# Patient Record
Sex: FEMALE | Race: BLACK OR AFRICAN AMERICAN | NOT HISPANIC OR LATINO | Employment: OTHER | URBAN - METROPOLITAN AREA
[De-identification: names, ages, dates, MRNs, and addresses within clinical notes are randomized per-mention and may not be internally consistent; named-entity substitution may affect disease eponyms.]

---

## 2017-08-01 ENCOUNTER — OFFICE VISIT (OUTPATIENT)
Dept: PEDIATRICS | Facility: CLINIC | Age: 62
End: 2017-08-01
Payer: COMMERCIAL

## 2017-08-01 VITALS
SYSTOLIC BLOOD PRESSURE: 138 MMHG | DIASTOLIC BLOOD PRESSURE: 78 MMHG | OXYGEN SATURATION: 96 % | HEIGHT: 61 IN | BODY MASS INDEX: 32.76 KG/M2 | HEART RATE: 60 BPM | WEIGHT: 173.5 LBS | TEMPERATURE: 97.5 F

## 2017-08-01 DIAGNOSIS — M54.41 ACUTE BILATERAL LOW BACK PAIN WITH BILATERAL SCIATICA: ICD-10-CM

## 2017-08-01 DIAGNOSIS — Z00.00 ROUTINE HEALTH MAINTENANCE: Primary | ICD-10-CM

## 2017-08-01 DIAGNOSIS — M54.42 ACUTE BILATERAL LOW BACK PAIN WITH BILATERAL SCIATICA: ICD-10-CM

## 2017-08-01 LAB
BASOPHILS # BLD AUTO: 0 10E9/L (ref 0–0.2)
BASOPHILS NFR BLD AUTO: 0.1 %
DIFFERENTIAL METHOD BLD: NORMAL
EOSINOPHIL # BLD AUTO: 0.1 10E9/L (ref 0–0.7)
EOSINOPHIL NFR BLD AUTO: 1 %
ERYTHROCYTE [DISTWIDTH] IN BLOOD BY AUTOMATED COUNT: 11.8 % (ref 10–15)
HCT VFR BLD AUTO: 39.4 % (ref 35–47)
HGB BLD-MCNC: 13.5 G/DL (ref 11.7–15.7)
LYMPHOCYTES # BLD AUTO: 2 10E9/L (ref 0.8–5.3)
LYMPHOCYTES NFR BLD AUTO: 28 %
MCH RBC QN AUTO: 30.3 PG (ref 26.5–33)
MCHC RBC AUTO-ENTMCNC: 34.3 G/DL (ref 31.5–36.5)
MCV RBC AUTO: 89 FL (ref 78–100)
MONOCYTES # BLD AUTO: 0.6 10E9/L (ref 0–1.3)
MONOCYTES NFR BLD AUTO: 7.9 %
NEUTROPHILS # BLD AUTO: 4.5 10E9/L (ref 1.6–8.3)
NEUTROPHILS NFR BLD AUTO: 63 %
PLATELET # BLD AUTO: 174 10E9/L (ref 150–450)
RBC # BLD AUTO: 4.45 10E12/L (ref 3.8–5.2)
WBC # BLD AUTO: 7.2 10E9/L (ref 4–11)

## 2017-08-01 PROCEDURE — G0123 SCREEN CERV/VAG THIN LAYER: HCPCS | Performed by: NURSE PRACTITIONER

## 2017-08-01 PROCEDURE — 80061 LIPID PANEL: CPT | Performed by: NURSE PRACTITIONER

## 2017-08-01 PROCEDURE — 99386 PREV VISIT NEW AGE 40-64: CPT | Performed by: NURSE PRACTITIONER

## 2017-08-01 PROCEDURE — 99213 OFFICE O/P EST LOW 20 MIN: CPT | Mod: 25 | Performed by: NURSE PRACTITIONER

## 2017-08-01 PROCEDURE — 86803 HEPATITIS C AB TEST: CPT | Performed by: NURSE PRACTITIONER

## 2017-08-01 PROCEDURE — 85025 COMPLETE CBC W/AUTO DIFF WBC: CPT | Performed by: NURSE PRACTITIONER

## 2017-08-01 PROCEDURE — 36415 COLL VENOUS BLD VENIPUNCTURE: CPT | Performed by: NURSE PRACTITIONER

## 2017-08-01 PROCEDURE — G0476 HPV COMBO ASSAY CA SCREEN: HCPCS | Performed by: NURSE PRACTITIONER

## 2017-08-01 PROCEDURE — 82274 ASSAY TEST FOR BLOOD FECAL: CPT | Performed by: NURSE PRACTITIONER

## 2017-08-01 PROCEDURE — 80053 COMPREHEN METABOLIC PANEL: CPT | Performed by: NURSE PRACTITIONER

## 2017-08-01 NOTE — MR AVS SNAPSHOT
After Visit Summary   8/1/2017    Rajwinder Lama    MRN: [id number]           Patient Information     Date Of Birth          1955        Visit Information        Provider Department      8/1/2017 9:45 AM Mohini Dillon APRN CNP; [employer] [facility]        Today's Diagnoses     Routine health maintenance    -  1    Acute bilateral low back pain with bilateral sciatica          Care Instructions    -Ask insurance about shingles vaccine  -Mammogram  -Please send back stool test    Preventive Health Recommendations  Female Ages 50 - 64    Yearly exam: See your health care provider every year in order to  o Review health changes.   o Discuss preventive care.    o Review your medicines if your doctor has prescribed any.      Get a Pap test every three years (unless you have an abnormal result and your provider advises testing more often).    If you get Pap tests with HPV test, you only need to test every 5 years, unless you have an abnormal result.     You do not need a Pap test if your uterus was removed (hysterectomy) and you have not had cancer.    You should be tested each year for STDs (sexually transmitted diseases) if you're at risk.     Have a mammogram every 1 to 2 years.    Have a colonoscopy at age 50, or have a yearly FIT test (stool test). These exams screen for colon cancer.      Have a cholesterol test every 5 years, or more often if advised.    Have a diabetes test (fasting glucose) every three years. If you are at risk for diabetes, you should have this test more often.     If you are at risk for osteoporosis (brittle bone disease), think about having a bone density scan (DEXA).    Shots: Get a flu shot each year. Get a tetanus shot every 10 years.    Nutrition:     Eat at least 5 servings of fruits and vegetables each day.    Eat whole-grain bread, whole-wheat pasta and brown rice instead of white grains and rice.    Talk to your provider about  Calcium and Vitamin D.     Lifestyle    Exercise at least 150 minutes a week (30 minutes a day, 5 days a week). This will help you control your weight and prevent disease.    Limit alcohol to one drink per day.    No smoking.     Wear sunscreen to prevent skin cancer.     See your dentist every six months for an exam and cleaning.    See your eye doctor every 1 to 2 years.            Follow-ups after your visit        Additional Services     [facility] PT, HAND, AND CHIROPRACTIC REFERRAL       **This order will print in the [facility] Scheduling Office**    Physical Therapy, Hand Therapy and Chiropractic Care are available through:    *[facility]  *[facility]  *[facility]    Call one number to schedule at any of the above locations: [phone].    Your provider has referred you to: Physical Therapy at [facility] or [facility]    Indication/Reason for Referral: LBP  Onset of Illness: months  Therapy Orders: Evaluate and Treat  Special Programs: None  Special Request: None    Dg Conte      Additional Comments for the Therapist or Chiropractor:     Please be aware that coverage of these services is subject to the terms and limitations of your health insurance plan.  Call member services at your health plan with any benefit or coverage questions.      Please bring the following to your appointment:    *Your personal calendar for scheduling future appointments  *Comfortable clothing                  Future tests that were ordered for you today     Open Future Orders        Priority Expected Expires Ordered    *MA Screening Digital Bilateral Routine  8/1/2018 8/1/2017    Fecal colorectal cancer screen (FIT) Routine 8/22/2017 10/24/2017 8/1/2017            Who to contact     If you have questions or need follow up information about today's clinic visit or your schedule please contact [facility] directly at [phone].  Normal or non-critical lab and imaging results will  "be communicated to you by Tempered Mindhart, letter or phone within 4 business days after the clinic has received the results. If you do not hear from us within 7 days, please contact the clinic through DecideQuick or phone. If you have a critical or abnormal lab result, we will notify you by phone as soon as possible.  Submit refill requests through DecideQuick or call your pharmacy and they will forward the refill request to us. Please allow 3 business days for your refill to be completed.          Additional Information About Your Visit        DecideQuick Information     DecideQuick lets you send messages to your doctor, view your test results, renew your prescriptions, schedule appointments and more. To sign up, go to [email or web address] . Click on \"Log in\" on the left side of the screen, which will take you to the Welcome page. Then click on \"Sign up Now\" on the right side of the page.     You will be asked to enter the access code listed below, as well as some personal information. Please follow the directions to create your username and password.     Your access code is: [email or web address]  Expires: 10/30/2017 10:48 AM     Your access code will  in 90 days. If you need help or a new code, please call your [facility] clinic or [phone].        Care EveryWhere ID     This is your Care EveryWhere ID. This could be used by other organizations to access your [facility] medical records  [id number]        Your Vitals Were     Pulse Temperature Height Pulse Oximetry BMI (Body Mass Index)       60 97.5  F (36.4  C) (Tympanic) 5' 1.25\" (1.556 m) 96% 32.52 kg/m2        Blood Pressure from Last 3 Encounters:   17 138/78    Weight from Last 3 Encounters:   17 173 lb 8 oz (78.7 kg)              We Performed the Following     **Hepatitis C Screen Reflex to RNA FUTURE anytime     CBC with platelets differential     Comprehensive metabolic panel     HPV High Risk Types DNA Cervical     [facility] PT, HAND, AND CHIROPRACTIC REFERRAL     " LIPID REFLEX TO DIRECT LDL PANEL     Pap imaged thin layer screen with HPV - recommended age 30 - 65     TDAP VACCINE (ADACEL)        Primary Care Provider Fax #    Lionel Melchor [phone]       No address on file        Equal Access to Services     TRCAEY PEÑA : Hadii aad ku hadpatricko Sojanetali, waaxda luqadaha, qaybta kaalmada ademichael, hanane francoisin hayaarex rick vidalaracely rosales. So [facility] [phone].    ATENCIÓN: Si habla español, tiene a jaramillo disposición servicios gratuitos de asistencia lingüística. Llame al [phone].    We comply with applicable federal civil rights laws and [place] laws. We do not discriminate on the basis of race, color, national origin, age, disability sex, sexual orientation or gender identity.            Thank you!     Thank you for choosing [facility]  for your care. Our goal is always to provide you with excellent care. Hearing back from our patients is one way we can continue to improve our services. Please take a few minutes to complete the written survey that you may receive in the mail after your visit with us. Thank you!             Your Updated Medication List - Protect others around you: Learn how to safely use, store and throw away your medicines at www.disposemymeds.org.          This list is accurate as of: 8/1/17 10:48 AM.  Always use your most recent med list.                   Brand Name Dispense Instructions for use Diagnosis    MULTIVITAMIN PO

## 2017-08-01 NOTE — NURSING NOTE
"Chief Complaint   Patient presents with     Physical     Establish Care       Initial /78 (Cuff Size: Adult Large)  Pulse 60  Temp 97.5  F (36.4  C) (Tympanic)  Ht 5' 1.25\" (1.556 m)  Wt 173 lb 8 oz (78.7 kg)  SpO2 96%  BMI 32.52 kg/m2 Estimated body mass index is 32.52 kg/(m^2) as calculated from the following:    Height as of this encounter: 5' 1.25\" (1.556 m).    Weight as of this encounter: 173 lb 8 oz (78.7 kg).  Medication Reconciliation: complete   Llilie Jameson, DUSTY    "

## 2017-08-01 NOTE — PATIENT INSTRUCTIONS
-Ask insurance about shingles vaccine  -Mammogram  -Please send back stool test    Preventive Health Recommendations  Female Ages 50 - 64    Yearly exam: See your health care provider every year in order to  o Review health changes.   o Discuss preventive care.    o Review your medicines if your doctor has prescribed any.      Get a Pap test every three years (unless you have an abnormal result and your provider advises testing more often).    If you get Pap tests with HPV test, you only need to test every 5 years, unless you have an abnormal result.     You do not need a Pap test if your uterus was removed (hysterectomy) and you have not had cancer.    You should be tested each year for STDs (sexually transmitted diseases) if you're at risk.     Have a mammogram every 1 to 2 years.    Have a colonoscopy at age 50, or have a yearly FIT test (stool test). These exams screen for colon cancer.      Have a cholesterol test every 5 years, or more often if advised.    Have a diabetes test (fasting glucose) every three years. If you are at risk for diabetes, you should have this test more often.     If you are at risk for osteoporosis (brittle bone disease), think about having a bone density scan (DEXA).    Shots: Get a flu shot each year. Get a tetanus shot every 10 years.    Nutrition:     Eat at least 5 servings of fruits and vegetables each day.    Eat whole-grain bread, whole-wheat pasta and brown rice instead of white grains and rice.    Talk to your provider about Calcium and Vitamin D.     Lifestyle    Exercise at least 150 minutes a week (30 minutes a day, 5 days a week). This will help you control your weight and prevent disease.    Limit alcohol to one drink per day.    No smoking.     Wear sunscreen to prevent skin cancer.     See your dentist every six months for an exam and cleaning.    See your eye doctor every 1 to 2 years.

## 2017-08-01 NOTE — PROGRESS NOTES
SUBJECTIVE:   CC: Rajwinder Lama is an 62 year old woman who presents with  and daughter for preventive health visit.     HPI    Healthy Habits:    States she fasts a lot for [employer]    Do you get at least three servings of calcium containing foods daily (dairy, green leafy vegetables, etc.)? yes    Amount of exercise or daily activities, outside of work: was very active - since leg problem started 3 months ago has decreased activity    Problems taking medications regularly not applicable    Medication side effects: No    Have you had an eye exam in the past two years? no    Do you see a dentist twice per year? no    Do you have sleep apnea, excessive snoring or daytime drowsiness?does snore - but states it does not interfere with her sleep    Concerns today: about 3 months ago legs have become painful.  Legs are painful. Pain in the low back radiating down to the calves. Pain is deep and feels like severe pain and itching. Occurs day and night, with walking and at rest.   Both started 2-3 months ago. No fall or trauma.     States she has never had a mammogram    -------------------------------------    Today's PHQ-2 Score:   PHQ-2 ( 1999 Pfizer) 8/1/2017   Q1: Little interest or pleasure in doing things 0   Q2: Feeling down, depressed or hopeless 1   PHQ-2 Score 1     Abuse: Current or Past(Physical, Sexual or Emotional)- No  Do you feel safe in your environment - Yes    Social History   Substance Use Topics     Smoking status: Never Smoker     Smokeless tobacco: Never Used     Alcohol use Yes      Comment: ethipian drink rarely     The patient does not drink >3 drinks per day nor >7 drinks per week.    Reviewed orders with patient.  Reviewed health maintenance and updated orders accordingly - Yes  Labs reviewed in [facility]    Patient over age 50, mutual decision to screen reflected in health maintenance.      Pertinent mammograms are reviewed under the imaging tab.  History of abnormal Pap smear: NO -  "age 30-65 PAP every 5 years with negative HPV co-testing recommended    Reviewed and updated as needed this visit by clinical staff  Tobacco  Allergies  Meds  Med Hx  Surg Hx  Fam Hx  Soc Hx        Reviewed and updated as needed this visit by Provider              ROS:  C: NEGATIVE for fever, chills, change in weight  I: NEGATIVE for worrisome rashes, moles or lesions  E: NEGATIVE for vision changes or irritation  ENT: NEGATIVE for ear, mouth and throat problems  R: NEGATIVE for significant cough or SOB  B: NEGATIVE for masses, tenderness or discharge  CV: NEGATIVE for chest pain, palpitations or peripheral edema  GI: NEGATIVE for nausea, abdominal pain, heartburn, or change in bowel habits  : NEGATIVE for unusual urinary or vaginal symptoms. No vaginal bleeding.  M: NEGATIVE for significant arthralgias or myalgia  N: NEGATIVE for weakness, dizziness or paresthesias  P: NEGATIVE for changes in mood or affect      OBJECTIVE:   /78 (Cuff Size: Adult Large)  Pulse 60  Temp 97.5  F (36.4  C) (Tympanic)  Ht 5' 1.25\" (1.556 m)  Wt 173 lb 8 oz (78.7 kg)  SpO2 96%  BMI 32.52 kg/m2  EXAM:  GENERAL APPEARANCE: healthy, alert and no distress  EYES: Eyes grossly normal to inspection, PERRL and conjunctivae and sclerae normal  HENT: ear canals and TM's normal, nose and mouth without ulcers or lesions, oropharynx clear and oral mucous membranes moist  NECK: no adenopathy, no asymmetry, masses, or scars and thyroid normal to palpation  RESP: lungs clear to auscultation - no rales, rhonchi or wheezes  BREAST: normal without masses, tenderness or nipple discharge and no palpable axillary masses or adenopathy  CV: regular rate and rhythm, normal S1 S2, no S3 or S4, no murmur, click or rub, no peripheral edema and peripheral pulses strong  ABDOMEN: soft, nontender, no hepatosplenomegaly, no masses and bowel sounds normal   (female): normal female external genitalia, normal urethral meatus, vaginal mucosal atrophy " "noted, normal cervix, adnexae, and uterus without masses or abnormal discharge  MS: no musculoskeletal defects are noted and gait is age appropriate without ataxia  MSK: No deformity of spine. No TTP midline lumbar spine. No TTP paraspinal muscles. No TTP SI joint. 5/5 strength LEs.  NEURO: +2 DTRs. Negative SLR.   SKIN: no suspicious lesions or rashes  NEURO: Normal strength and tone, sensory exam grossly normal, mentation intact and speech normal  PSYCH: mentation appears normal and affect normal/bright    ASSESSMENT/PLAN:   1. Routine health maintenance  - GLUCOSE  - LIPID REFLEX TO DIRECT LDL PANEL  - Pap imaged thin layer screen with HPV - recommended age 30 - 65  - HPV High Risk Types DNA Cervical  - FIT  - *MA Screening Digital Bilateral; Future  -Recommended she check on shingles vaccine coverage through insurance.       (M54.42,  M54.41) Acute bilateral low back pain with bilateral sciatica  Comment: Patient is a poor historian even with  but it sounds like she has been having several months of LBP with radiculopathy down both legs. No red flag sx. DDX includes muscle spasm, herniated disc, spinal stenosis, etc. Will start with 4 weeks of PT with home exercises and have her f/u if condition not dramatically improving.   Plan: [facility] PT, HAND, AND CHIROPRACTIC REFERRAL        Discussed reasons to seek care urgently.         COUNSELING:  Reviewed preventive health counseling, as reflected in patient instructions    BP Screening:   Last 3 BP Readings:    BP Readings from Last 3 Encounters:   08/01/17 138/78       The following was recommended to the patient:  Re-screen BP within a year and recommended lifestyle modifications     reports that she has never smoked. She has never used smokeless tobacco.    Estimated body mass index is 32.52 kg/(m^2) as calculated from the following:    Height as of this encounter: 5' 1.25\" (1.556 m).    Weight as of this encounter: 173 lb 8 oz (78.7 kg).   Weight " management plan: Discussed healthy diet and exercise guidelines and patient will follow up in 12 months in clinic to re-evaluate.    Counseling Resources:  ATP IV Guidelines  Pooled Cohorts Equation Calculator  Breast Cancer Risk Calculator  FRAX Risk Assessment  ICSI Preventive Guidelines  Dietary Guidelines for Americans, 2010  USDA's MyPlate  ASA Prophylaxis  Lung CA Screening    Mohini Dillon, CHRISTIN LOPEZ  [facility]

## 2017-08-02 LAB
ALBUMIN SERPL-MCNC: 3.7 G/DL (ref 3.4–5)
ALP SERPL-CCNC: 58 U/L (ref 40–150)
ALT SERPL W P-5'-P-CCNC: 21 U/L (ref 0–50)
ANION GAP SERPL CALCULATED.3IONS-SCNC: 8 MMOL/L (ref 3–14)
AST SERPL W P-5'-P-CCNC: 16 U/L (ref 0–45)
BILIRUB SERPL-MCNC: 0.4 MG/DL (ref 0.2–1.3)
BUN SERPL-MCNC: 14 MG/DL (ref 7–30)
CALCIUM SERPL-MCNC: 9.2 MG/DL (ref 8.5–10.1)
CHLORIDE SERPL-SCNC: 107 MMOL/L (ref 94–109)
CHOLEST SERPL-MCNC: 160 MG/DL
CO2 SERPL-SCNC: 29 MMOL/L (ref 20–32)
CREAT SERPL-MCNC: 0.58 MG/DL (ref 0.52–1.04)
GFR SERPL CREATININE-BSD FRML MDRD: NORMAL ML/MIN/1.7M2
GLUCOSE SERPL-MCNC: 83 MG/DL (ref 70–99)
HCV AB SERPL QL IA: NORMAL
HDLC SERPL-MCNC: 58 MG/DL
LDLC SERPL CALC-MCNC: 85 MG/DL
NONHDLC SERPL-MCNC: 102 MG/DL
POTASSIUM SERPL-SCNC: 3.8 MMOL/L (ref 3.4–5.3)
PROT SERPL-MCNC: 7 G/DL (ref 6.8–8.8)
SODIUM SERPL-SCNC: 144 MMOL/L (ref 133–144)
TRIGL SERPL-MCNC: 87 MG/DL

## 2017-08-04 LAB
COPATH REPORT: NORMAL
PAP: NORMAL

## 2017-08-07 ENCOUNTER — RESULT FOLLOW UP (OUTPATIENT)
Dept: PEDIATRICS | Facility: CLINIC | Age: 62
End: 2017-08-07

## 2017-08-07 DIAGNOSIS — R87.810 CERVICAL HIGH RISK HPV (HUMAN PAPILLOMAVIRUS) TEST POSITIVE: ICD-10-CM

## 2017-08-07 LAB
FINAL DIAGNOSIS: ABNORMAL
HPV HR 12 DNA CVX QL NAA+PROBE: NEGATIVE
HPV16 DNA SPEC QL NAA+PROBE: POSITIVE
HPV18 DNA SPEC QL NAA+PROBE: NEGATIVE
SPECIMEN DESCRIPTION: ABNORMAL

## 2017-08-07 NOTE — LETTER
November 29, 2017      Jacannalee Lama  [address] [place] [address]    Dear ,      At [facility], your health and wellness is our primary concern. That is why we are following up on a positive high risk HPV test from 08/01/17, which was reported as HPV 16. Your provider had recommended that you have a Colposcopy completed by 11/01/17. Our records do not show that this has been done.      It is important to complete the follow up that your provider has suggested for you to ensure that there are no worsening changes which may, over time, develop into cancer.      Please contact our office at  [phone] to reschedule your appointment for a Colposcopy at your earliest convenience. If you have questions or concerns, please call the clinic and we will be happy to assist you.    If you have completed the tests outside of [facility], please have the results forwarded to our office. We will update the chart for your primary Physician to review before your next annual physical.     Thank you for choosing [facility]!    Sincerely,      Mohini Dillon, CHRISTIN CNP/es

## 2017-08-07 NOTE — LETTER
July 22, 2019      Rajwinder KATIA Daina  [address] [place] [address]    Dear ,      At [facility], your health and wellness is our primary concern. That is why we are following up on a positive high risk HPV test from 02/05/19, which was reported as HPV 16. Your provider had recommended that you have a Colposcopy  completed by 05/05/19. Our records do not show that this has been done or scheduled.      If you have chosen not to do the recommended colposcopy, please contact our office at [phone] to schedule an appointment for a repeat PAP smear and HPV test at your earliest convenience.    If you have completed the tests outside of [facility], please have the results forwarded to our office. We will update the chart for your primary Physician to review before your next annual physical.     Thank you for choosing [facility]!    Sincerely,      Your [facility] Care Team/[facility]

## 2017-08-07 NOTE — PROGRESS NOTES
08/01/17 NIL, +HPV 16. Plan colp  [facility]09[facility]17 [facility] Left message for patient to call back to this writer.   08/23/17 Left message on pt's home number listed for her or her daughter to call me back for results, there is consent in chart to speak with pts daughter and other family members as well.   08/24/17 Spoke with pt's daughter Claire and relayed results and recommendations for colposcopy. [place] scheduled for 10/27/17 with Dr. Eli.  11/16/17 Pt no showed colp. zanda colp reminder message sent at request of RN. ([facility])  11/29/17 MyChart not read, colp reminder letter sent. ([facility])   12/15/17 [place]- ZO 2-3.  Plan: LEEP due by 3/15/18 Appt 2/14/18 (rlm)  2/14/17 LEEP - Negative. Plan 1 yr co-test due 2/14/2019. ([facility])  02/01/19 Panel management sent zanda message. ([facility])  02/05/19: NIL pap, + HR HPV type 16 result. Plan [place] due bef 05/05/19. (sk)  02/12/19 Phone call attempt with interpretor, no answer, voice mail box was full so unable to leave a message, will make 2nd phone call attempt later this week. ([facility])  02/14/19 Phone call attempt to pt's daughter's number (see consent to communicate in chart), no answer, voice mail box is full so unable to leave message. zanda result letter released with recommendations for colposcopy.   02/22/19 Result letter sent at request of RN. ([facility])  04/05/19 Result letter sent certified at request of RN: [phone] ([facility])   04/12/19 Per USPS tracking, certified letter delivered 04/08/19. Verification sent to [facility] for scanning into pts chart. ([facility])  05/06/19 3mo colp not done, chart updated for 6mo colp/pap. ([facility])  07/22/19 [facility] reminder letter sent. ([facility])  8/21/19 [place]- Negative. Plan 1 yr co-test due by 8/21/20. Patient informed per Gyn RN ([facility])

## 2017-08-08 ENCOUNTER — OFFICE VISIT (OUTPATIENT)
Dept: OPTOMETRY | Facility: CLINIC | Age: 62
End: 2017-08-08
Payer: COMMERCIAL

## 2017-08-08 ENCOUNTER — RADIANT APPOINTMENT (OUTPATIENT)
Dept: MAMMOGRAPHY | Facility: CLINIC | Age: 62
End: 2017-08-08
Attending: NURSE PRACTITIONER
Payer: COMMERCIAL

## 2017-08-08 ENCOUNTER — APPOINTMENT (OUTPATIENT)
Dept: OPTOMETRY | Facility: CLINIC | Age: 62
End: 2017-08-08
Payer: COMMERCIAL

## 2017-08-08 DIAGNOSIS — H26.9 BILATERAL INCIPIENT CATARACTS: ICD-10-CM

## 2017-08-08 DIAGNOSIS — Z00.00 ROUTINE HEALTH MAINTENANCE: ICD-10-CM

## 2017-08-08 DIAGNOSIS — H52.03 HYPEROPIA WITH ASTIGMATISM AND PRESBYOPIA, BILATERAL: Primary | ICD-10-CM

## 2017-08-08 DIAGNOSIS — H52.4 HYPEROPIA WITH ASTIGMATISM AND PRESBYOPIA, BILATERAL: Primary | ICD-10-CM

## 2017-08-08 DIAGNOSIS — H21.9 FOCAL LESION OF IRIS: ICD-10-CM

## 2017-08-08 DIAGNOSIS — H52.203 HYPEROPIA WITH ASTIGMATISM AND PRESBYOPIA, BILATERAL: Primary | ICD-10-CM

## 2017-08-08 LAB — HEMOCCULT STL QL IA: NEGATIVE

## 2017-08-08 PROCEDURE — 92341 FIT SPECTACLES BIFOCAL: CPT | Performed by: OPTOMETRIST

## 2017-08-08 PROCEDURE — 92004 COMPRE OPH EXAM NEW PT 1/>: CPT | Performed by: OPTOMETRIST

## 2017-08-08 PROCEDURE — 92015 DETERMINE REFRACTIVE STATE: CPT | Performed by: OPTOMETRIST

## 2017-08-08 PROCEDURE — G0202 SCR MAMMO BI INCL CAD: HCPCS | Mod: TC

## 2017-08-08 ASSESSMENT — EXTERNAL EXAM - RIGHT EYE: OD_EXAM: NORMAL

## 2017-08-08 ASSESSMENT — KERATOMETRY
METHOD_AUTO_MANUAL: AUTOMATED
OS_K2POWER_DIOPTERS: 43.50
OS_AXISANGLE_DEGREES: 79
OS_K1POWER_DIOPTERS: 42.50
OD_AXISANGLE2_DEGREES: 171
OD_K1POWER_DIOPTERS: 41.87
OS_AXISANGLE2_DEGREES: 169
OD_AXISANGLE_DEGREES: 81
OD_K2POWER_DIOPTERS: 43.25

## 2017-08-08 ASSESSMENT — REFRACTION_MANIFEST
OD_CYLINDER: SPHERE
OS_AXIS: 030
OD_CYLINDER: +0.25
OS_SPHERE: +3.25
METHOD_AUTOREFRACTION: 1
OD_SPHERE: +1.25
OS_CYLINDER: +0.75
OS_AXIS: 005
OD_SPHERE: +1.25
OD_AXIS: 129
OS_CYLINDER: +0.50
OS_SPHERE: +3.00

## 2017-08-08 ASSESSMENT — EXTERNAL EXAM - LEFT EYE: OS_EXAM: NORMAL

## 2017-08-08 ASSESSMENT — VISUAL ACUITY
OS_SC: 20/60
OD_SC: 20/80
METHOD: SNELLEN - LINEAR
OS_SC: 20/125
OS_SC+: -2
OD_SC: 20/40
OD_SC+: -1

## 2017-08-08 ASSESSMENT — CONF VISUAL FIELD
OD_NORMAL: 1
OS_NORMAL: 1

## 2017-08-08 ASSESSMENT — SLIT LAMP EXAM - LIDS
COMMENTS: NORMAL
COMMENTS: NORMAL

## 2017-08-08 ASSESSMENT — TONOMETRY
IOP_METHOD: APPLANATION
OS_IOP_MMHG: 15
OD_IOP_MMHG: 14

## 2017-08-08 ASSESSMENT — CUP TO DISC RATIO
OS_RATIO: 0.5
OD_RATIO: 0.5

## 2017-08-08 NOTE — MR AVS SNAPSHOT
After Visit Summary   8/8/2017    Rajwinder Lama    MRN: [id number]           Patient Information     Date Of Birth          1955        Visit Information        Provider Department      8/8/2017 11:00 AM Rin Jameson OD; [employer] [facility]        Today's Diagnoses     Hyperopia with astigmatism and presbyopia, bilateral    -  1    Focal lesion of iris        Bilateral incipient cataracts           Follow-ups after your visit        Your next 10 appointments already scheduled     Aug 08, 2017  1:30 PM CDT   LAB with EA LAB   [facility] ([facility]    [address] [place] [address]  [address]  [facility] [id number] [phone]           Patient must bring picture ID. Patient should be prepared to give a urine specimen  Please do not eat 10-12 hours before your appointment if you are coming in fasting for labs on lipids, cholesterol, or glucose (sugar). Pregnant women should follow their Care Team instructions. Water with medications is okay. Do not drink coffee or other fluids. If you have concerns about taking  your medications, please ask at office or if scheduling via [employer], send a message by clicking on Secure Messaging, Message Your Care Team.            Aug 15, 2017  1:00 PM CDT   YOBANY Spine with Chely Lucia PT   [facility] ([facility]  )    [address] [place] [address]  [address]  [facility] [address]   [phone]            Aug 22, 2017 10:30 AM CDT   YOBANY Spine with Chely Lucia PT   [facility] ([facility]  )    [address] [place] [address]  [address]  [facility] [address]   [phone]              Who to contact     If you have questions or need follow up information about today's clinic visit or your schedule please contact [facility] directly at [phone].  Normal or non-critical lab and imaging results will be communicated to you by MyChart, letter or  "phone within 4 business days after the clinic has received the results. If you do not hear from us within 7 days, please contact the clinic through [employer] or phone. If you have a critical or abnormal lab result, we will notify you by phone as soon as possible.  Submit refill requests through [employer] or call your pharmacy and they will forward the refill request to us. Please allow 3 business days for your refill to be completed.          Additional Information About Your Visit        [employer] Information     [employer] lets you send messages to your doctor, view your test results, renew your prescriptions, schedule appointments and more. To sign up, go to [email or web address] . Click on \"Log in\" on the left side of the screen, which will take you to the Welcome page. Then click on \"Sign up Now\" on the right side of the page.     You will be asked to enter the access code listed below, as well as some personal information. Please follow the directions to create your username and password.     Your access code is: [email or web address]  Expires: 10/30/2017 10:48 AM     Your access code will  in 90 days. If you need help or a new code, please call your [place] clinic or [phone].        Care EveryWhere ID     This is your Care EveryWhere ID. This could be used by other organizations to access your [place] medical records  [id number]         Blood Pressure from Last 3 Encounters:   17 138/78    Weight from Last 3 Encounters:   17 78.7 kg (173 lb 8 oz)              We Performed the Following     EYE EXAM (SIMPLE-NONBILLABLE)     REFRACTION        Primary Care Provider Fax #    [facility] [phone]       No address on file        Equal Access to Services     TRACEY PEÑA : Hadii estevan Gómez, jose manuel presley, hanane ling. So [facility] [phone].    ATENCIÓN: Si habla español, tiene a jaramillo disposición servicios gratuitos de " miki lingüísticcece. Joshua al [phone].    We comply with applicable federal civil rights laws and [place] laws. We do not discriminate on the basis of race, color, national origin, age, disability sex, sexual orientation or gender identity.            Thank you!     Thank you for choosing [facility]  for your care. Our goal is always to provide you with excellent care. Hearing back from our patients is one way we can continue to improve our services. Please take a few minutes to complete the written survey that you may receive in the mail after your visit with us. Thank you!             Your Updated Medication List - Protect others around you: Learn how to safely use, store and throw away your medicines at www.disposemymeds.org.          This list is accurate as of: 8/8/17 12:09 PM.  Always use your most recent med list.                   Brand Name Dispense Instructions for use Diagnosis    MULTIVITAMIN PO

## 2017-08-08 NOTE — LETTER
[facility]  [address]  [place] [address]                  [phone]   August 8, 2017    Rajwinder Lama  [address] [address]      Dear Rajwinder,    Here is a summary of your recent test results:    Your stool testing was negative/normal.     We will do this every year to screen for colon cancer.     Your test results are enclosed.      Please contact me if you have any questions.           Thank you very much for choosing [facility]    Best regards,    Mohini Dillon CNP        Results for orders placed or performed in visit on 08/08/17   Fecal colorectal cancer screen (FIT)   Result Value Ref Range    Occult Blood Scn FIT Negative NEG

## 2017-08-08 NOTE — PROGRESS NOTES
Chief Complaint   Patient presents with     COMPREHENSIVE EYE EXAM     Routine never had an eye exam      Accompanied by   Last Eye Exam: Never  Dilated Previously: No, side effects of dilation explained today    What are you currently using to see?  does not use glasses or contacts       Distance Vision Acuity: Noticed gradual change in both eyes    Near Vision Acuity: Not satisfied     Eye Comfort: dry  Do you use eye drops? : Yes: OTC Rewetting  Occupation or Hobbies: Reading               Medical, surgical and family histories reviewed and updated 8/8/2017.       OBJECTIVE: See Ophthalmology exam    ASSESSMENT:    ICD-10-CM    1. Hyperopia with astigmatism and presbyopia, bilateral H52.03     H52.203     H52.4    2. Focal lesion of iris H21.89    3. Bilateral incipient cataracts H26.9       PLAN:   Poor communication/ very lengthy exam poor historian  Patient states at end of exam the spot on her L eye has been there since childhood and has not changed. Does not need referral, it was measured today and will be reassessed if changes occur.      Rin Jameson OD

## 2017-08-15 ENCOUNTER — THERAPY VISIT (OUTPATIENT)
Dept: PHYSICAL THERAPY | Facility: CLINIC | Age: 62
End: 2017-08-15
Payer: COMMERCIAL

## 2017-08-15 DIAGNOSIS — M25.562 KNEE PAIN, LEFT: ICD-10-CM

## 2017-08-15 DIAGNOSIS — M54.50 LUMBAGO: ICD-10-CM

## 2017-08-15 PROCEDURE — 97161 PT EVAL LOW COMPLEX 20 MIN: CPT | Mod: GP | Performed by: PHYSICAL THERAPIST

## 2017-08-15 PROCEDURE — 97110 THERAPEUTIC EXERCISES: CPT | Mod: GP | Performed by: PHYSICAL THERAPIST

## 2017-08-25 ENCOUNTER — THERAPY VISIT (OUTPATIENT)
Dept: PHYSICAL THERAPY | Facility: CLINIC | Age: 62
End: 2017-08-25
Payer: COMMERCIAL

## 2017-08-25 DIAGNOSIS — M54.50 LUMBAGO: ICD-10-CM

## 2017-08-25 DIAGNOSIS — M25.562 KNEE PAIN, LEFT: ICD-10-CM

## 2017-08-25 PROCEDURE — 97140 MANUAL THERAPY 1/> REGIONS: CPT | Mod: GP | Performed by: PHYSICAL THERAPIST

## 2017-08-25 PROCEDURE — 97110 THERAPEUTIC EXERCISES: CPT | Mod: GP | Performed by: PHYSICAL THERAPIST

## 2017-08-25 PROCEDURE — 97112 NEUROMUSCULAR REEDUCATION: CPT | Mod: GP | Performed by: PHYSICAL THERAPIST

## 2017-09-01 ENCOUNTER — THERAPY VISIT (OUTPATIENT)
Dept: PHYSICAL THERAPY | Facility: CLINIC | Age: 62
End: 2017-09-01
Payer: COMMERCIAL

## 2017-09-01 DIAGNOSIS — M54.50 LUMBAGO: ICD-10-CM

## 2017-09-01 DIAGNOSIS — M25.562 KNEE PAIN, LEFT: ICD-10-CM

## 2017-09-01 PROCEDURE — 97110 THERAPEUTIC EXERCISES: CPT | Mod: GP | Performed by: PHYSICAL THERAPIST

## 2017-09-01 PROCEDURE — 97112 NEUROMUSCULAR REEDUCATION: CPT | Mod: GP | Performed by: PHYSICAL THERAPIST

## 2017-09-08 ENCOUNTER — THERAPY VISIT (OUTPATIENT)
Dept: PHYSICAL THERAPY | Facility: CLINIC | Age: 62
End: 2017-09-08
Payer: COMMERCIAL

## 2017-09-08 DIAGNOSIS — M54.50 LUMBAGO: ICD-10-CM

## 2017-09-08 DIAGNOSIS — M25.562 KNEE PAIN, LEFT: ICD-10-CM

## 2017-09-08 PROCEDURE — 97110 THERAPEUTIC EXERCISES: CPT | Mod: GP | Performed by: PHYSICAL THERAPIST

## 2017-09-08 PROCEDURE — 97112 NEUROMUSCULAR REEDUCATION: CPT | Mod: GP | Performed by: PHYSICAL THERAPIST

## 2017-09-13 ENCOUNTER — OFFICE VISIT (OUTPATIENT)
Dept: PEDIATRICS | Facility: CLINIC | Age: 62
End: 2017-09-13
Payer: COMMERCIAL

## 2017-09-13 ENCOUNTER — RADIANT APPOINTMENT (OUTPATIENT)
Dept: GENERAL RADIOLOGY | Facility: CLINIC | Age: 62
End: 2017-09-13
Attending: PHYSICIAN ASSISTANT
Payer: COMMERCIAL

## 2017-09-13 VITALS
SYSTOLIC BLOOD PRESSURE: 154 MMHG | TEMPERATURE: 98.8 F | BODY MASS INDEX: 32.89 KG/M2 | DIASTOLIC BLOOD PRESSURE: 70 MMHG | WEIGHT: 174.2 LBS | HEART RATE: 56 BPM | HEIGHT: 61 IN | RESPIRATION RATE: 14 BRPM | OXYGEN SATURATION: 97 %

## 2017-09-13 DIAGNOSIS — M54.41 CHRONIC RIGHT-SIDED LOW BACK PAIN WITH RIGHT-SIDED SCIATICA: ICD-10-CM

## 2017-09-13 DIAGNOSIS — M25.562 CHRONIC PAIN OF LEFT KNEE: ICD-10-CM

## 2017-09-13 DIAGNOSIS — G89.29 CHRONIC PAIN OF LEFT KNEE: Primary | ICD-10-CM

## 2017-09-13 DIAGNOSIS — M25.562 CHRONIC PAIN OF LEFT KNEE: Primary | ICD-10-CM

## 2017-09-13 DIAGNOSIS — G89.29 CHRONIC RIGHT-SIDED LOW BACK PAIN WITH RIGHT-SIDED SCIATICA: ICD-10-CM

## 2017-09-13 DIAGNOSIS — G89.29 CHRONIC PAIN OF LEFT KNEE: ICD-10-CM

## 2017-09-13 PROCEDURE — 73562 X-RAY EXAM OF KNEE 3: CPT | Mod: RT

## 2017-09-13 PROCEDURE — 99214 OFFICE O/P EST MOD 30 MIN: CPT | Performed by: PHYSICIAN ASSISTANT

## 2017-09-13 RX ORDER — HYDROCODONE BITARTRATE AND ACETAMINOPHEN 5; 325 MG/1; MG/1
1 TABLET ORAL EVERY 4 HOURS PRN
Qty: 15 TABLET | Refills: 0 | Status: SHIPPED | OUTPATIENT
Start: 2017-09-13 | End: 2018-02-14

## 2017-09-13 NOTE — PROGRESS NOTES
"  SUBJECTIVE:   Rajwinder Lama is a 62 year old female who presents to clinic today for the following health issues:    Patient speaks Spanish (from [place]). Daughter interpreting.     Joint Pain    Onset: 3 months and worsening     Description:   Location: left knee  Character: Dull ache and Burning    Intensity: severe; constant    No pain upon awakening and after one hour, pain begins    Progression of Symptoms: worse    Accompanying Signs & Symptoms:    Other symptoms: no swelling, bruising, redness    No locking.    Giving way sensation    Numbness and tingling from back pain    History:   Previous similar pain: YES- worse after starting Physical therapy     Precipitating factors:   Trauma or overuse: no  Standing, walking, movement    Alleviating factors:  Improved by: nothing  Therapies Tried and outcome: none    Patient also has LBP with right radiculopathy. Pain extends to right foot. Very painful. Has paresthesias. No improvement with physical therapy.    ROS:  ROS otherwise negative    OBJECTIVE:                                                    /70 (BP Location: Right arm, Patient Position: Chair, Cuff Size: Adult Large)  Pulse 56  Temp 98.8  F (37.1  C) (Oral)  Resp 14  Ht 5' 1.25\" (1.556 m)  Wt 174 lb 3.2 oz (79 kg)  SpO2 97%  BMI 32.65 kg/m2  Body mass index is 32.65 kg/(m^2).   GENERAL: alert, no distress  Knee Exam: Inspection: small effusion  Tender: medial joint line  Active Range of Motion: full flexion, full extension; pain with inversion and eversion  Strength: limited due to pain  Special tests: positive Michael's    Diagnostic test results:  Xray of the knee reveals osteophytes otherwise NEGATIVE.   No results found for this or any previous visit (from the past 24 hour(s)).       ASSESSMENT/PLAN:                                                    (M25.562,  G89.29) Chronic pain of left knee  (primary encounter diagnosis)  Comment: proceed with RICE and norco PRN pain. Follow " up with ortho.   Plan: HYDROcodone-acetaminophen (NORCO) 5-325 MG per         tablet, ORTHO  REFERRAL, CANCELED: XR         Knee Standing Left 2 Views            (M54.41,  G89.29) Chronic right-sided low back pain with right-sided sciatica  Comment: ongoing with no improvement with physical therapy. Given duration, severity and extent of symptoms, likely need MRI for further evaluation.   Plan: HYDROcodone-acetaminophen (NORCO) 5-325 MG per         tablet, ORTHO  REFERRAL            See Patient Instructions    Claritza Hart PA-C  [facility]

## 2017-09-13 NOTE — MR AVS SNAPSHOT
After Visit Summary   9/13/2017    Rajwinder Lama    MRN: [id number]           Patient Information     Date Of Birth          1955        Visit Information        Provider Department      9/13/2017 3:10 PM Claritza Hart PA-C [facility]        Today's Diagnoses     Chronic pain of left knee    -  1    Chronic right-sided low back pain with right-sided sciatica          Care Instructions    Follow up with ortho          Follow-ups after your visit        Additional Services     ORTHO  REFERRAL       [facility] is referring you to the Orthopedic  Services at [facility].       The  Representative will assist you in the coordination of your Orthopedic and Musculoskeletal Care as prescribed by your physician.    The  Representative will call you within 1 business day to help schedule your appointment, or you may contact the  Representative at:    All areas ~ [phone]     Type of Referral : Non Surgical       Timeframe requested: 3 - 5 days    Coverage of these services is subject to the terms and limitations of your health insurance plan.  Please call member services at your health plan with any benefit or coverage questions.      If X-rays, CT or MRI's have been performed, please contact the facility where they were done to arrange for , prior to your scheduled appointment.  Please bring this referral request to your appointment and present it to your specialist.                  Your next 10 appointments already scheduled     Sep 15, 2017 10:10 AM CDT   YOBANY Spine with Jenny Kebede PT   [facility] ([facility]  )    [address] [place] [address]  [address]  [facility] [address]   [phone]            Sep 22, 2017 10:10 AM CDT   YOBANY Spine with Jenny Kebede PT   [facility] ([facility]  )    [address] [place] [address]  [address]  [place] [address]   [phone]            Sep 29, 2017 10:10 AM CDT   YOBANY Spine with Jenny Kebede PT   [place] [facility] ([facility]  )    [address]  [address]  [place] [address]   [phone]            Oct 06, 2017 10:10 AM CDT   YOBANY Spine with Jenny Kebede PT   [facility] ([facility]  )    [address]  [address]  [place] [address]   [phone]            Oct 27, 2017  9:00 AM CDT   Colposcopy with Nichole Eli MD   [facility] ([facility])    [address] [place] [address]  [address]  [place] [id number] [phone]              Who to contact     If you have questions or need follow up information about today's clinic visit or your schedule please contact [facility] directly at [phone].  Normal or non-critical lab and imaging results will be communicated to you by Gam[employer], letter or phone within 4 business days after the clinic has received the results. If you do not hear from us within 7 days, please contact the clinic through [employer] or phone. If you have a critical or abnormal lab result, we will notify you by phone as soon as possible.  Submit refill requests through [facility] or call your pharmacy and they will forward the refill request to us. Please allow 3 business days for your refill to be completed.          Additional Information About Your Visit        [facility] Information     [facility] lets you send messages to your doctor, view your test results, renew your prescriptions, schedule appointments and more. To sign up, go to [email or web address] . Click on \"Log in\" on the left side of the screen, which will take you to the Welcome page. Then click on \"Sign up Now\" on the right side of the page.     You will be asked to enter the access code listed below, as well as some personal information. Please follow the directions to create your username and password.   " "  Your access code is: [email or web address]  Expires: 10/30/2017 10:48 AM     Your access code will  in 90 days. If you need help or a new code, please call your [place] clinic or [phone].        Care EveryWhere ID     This is your Care EveryWhere ID. This could be used by other organizations to access your [place] medical records  [id number]        Your Vitals Were     Pulse Temperature Respirations Height Pulse Oximetry BMI (Body Mass Index)    56 98.8  F (37.1  C) (Oral) 14 5' 1.25\" (1.556 m) 97% 32.65 kg/m2       Blood Pressure from Last 3 Encounters:   17 154/70   17 138/78    Weight from Last 3 Encounters:   17 174 lb 3.2 oz (79 kg)   17 173 lb 8 oz (78.7 kg)              We Performed the Following     ORTHO  REFERRAL          Today's Medication Changes          These changes are accurate as of: 17  3:57 PM.  If you have any questions, ask your nurse or doctor.               Start taking these medicines.        Dose/Directions    HYDROcodone-acetaminophen 5-325 MG per tablet   Commonly known as:  NORCO   Used for:  Chronic pain of left knee, Chronic right-sided low back pain with right-sided sciatica   Started by:  Claritza Hart PA-C        Dose:  1 tablet   Take 1 tablet by mouth every 4 hours as needed for pain   Quantity:  15 tablet   Refills:  0            Where to get your medicines      Some of these will need a paper prescription and others can be bought over the counter.  Ask your nurse if you have questions.     Bring a paper prescription for each of these medications     HYDROcodone-acetaminophen 5-325 MG per tablet                Primary Care Provider Fax #    [facility] [phone]       No address on file        Equal Access to Services     TRACEY PEÑA : Srinath Gómez, jose manuel presley, xiomara kahanane arias. So [facility] [phone].    ATENCIÓN: Si katie colunga " a jaramillo disposición servicios gratuitos de asistencia lingüística. Joshua hickey [phone].    We comply with applicable federal civil rights laws and [place] laws. We do not discriminate on the basis of race, color, national origin, age, disability sex, sexual orientation or gender identity.            Thank you!     Thank you for choosing [facility]  for your care. Our goal is always to provide you with excellent care. Hearing back from our patients is one way we can continue to improve our services. Please take a few minutes to complete the written survey that you may receive in the mail after your visit with us. Thank you!             Your Updated Medication List - Protect others around you: Learn how to safely use, store and throw away your medicines at www.disposemymeds.org.          This list is accurate as of: 9/13/17  3:57 PM.  Always use your most recent med list.                   Brand Name Dispense Instructions for use Diagnosis    HYDROcodone-acetaminophen 5-325 MG per tablet    NORCO    15 tablet    Take 1 tablet by mouth every 4 hours as needed for pain    Chronic pain of left knee, Chronic right-sided low back pain with right-sided sciatica       MULTIVITAMIN PO

## 2017-09-13 NOTE — NURSING NOTE
"Chief Complaint   Patient presents with     Knee Pain       Initial /70 (BP Location: Right arm, Patient Position: Chair, Cuff Size: Adult Large)  Pulse 56  Temp 98.8  F (37.1  C) (Oral)  Resp 14  Ht 5' 1.25\" (1.556 m)  Wt 174 lb 3.2 oz (79 kg)  SpO2 97%  BMI 32.65 kg/m2 Estimated body mass index is 32.65 kg/(m^2) as calculated from the following:    Height as of this encounter: 5' 1.25\" (1.556 m).    Weight as of this encounter: 174 lb 3.2 oz (79 kg).  Medication Reconciliation: complete   Gissel Vitale CMA (AAMA)    "

## 2017-09-18 ENCOUNTER — OFFICE VISIT (OUTPATIENT)
Dept: ORTHOPEDICS | Facility: CLINIC | Age: 62
End: 2017-09-18
Payer: COMMERCIAL

## 2017-09-18 VITALS
BODY MASS INDEX: 32.85 KG/M2 | WEIGHT: 174 LBS | SYSTOLIC BLOOD PRESSURE: 158 MMHG | DIASTOLIC BLOOD PRESSURE: 85 MMHG | HEIGHT: 61 IN

## 2017-09-18 DIAGNOSIS — M25.562 CHRONIC PAIN OF LEFT KNEE: Primary | ICD-10-CM

## 2017-09-18 DIAGNOSIS — E66.9 NON MORBID OBESITY, UNSPECIFIED OBESITY TYPE: ICD-10-CM

## 2017-09-18 DIAGNOSIS — M17.12 PRIMARY OSTEOARTHRITIS OF LEFT KNEE: ICD-10-CM

## 2017-09-18 DIAGNOSIS — G89.29 CHRONIC PAIN OF LEFT KNEE: Primary | ICD-10-CM

## 2017-09-18 PROCEDURE — 99243 OFF/OP CNSLTJ NEW/EST LOW 30: CPT | Performed by: PHYSICAL MEDICINE & REHABILITATION

## 2017-09-18 NOTE — PROGRESS NOTES
" [facility]   Clinic Visit s Sep 18, 2017    Subjective:  Rajwinder Lama is a 62 year old female who is seen in consultation at the request of Ms. Hart for evaluation of chronic left knee pain. Rajwinder Lama is accompanied today by an .    Symptoms began 7 months ago.  Describes injury as sudden pain of the left knee while going down stairs at that time.  Reports left knee pain that is located medial/posterior with radiation absent.  Pain is 8/10 in maximal severity and 6/10 currently.  Symptoms are worse with standing/stairs and better with sitting and use of Norco.  Other treatment has consisted of physical therapy ([facility]) with minimal relief.  Reports cracking of the left knee. Denies locking, catching, popping, bruising, or weakness of the left knee. Denies previous history of related injury/surgery to the left knee.    Patient's past medical, surgical, social, and family histories are reviewed today.  Significant medical history includes obesity    Review of Systems:  Constitutional: NEGATIVE for fever, chills, or change in weight  Skin: NEGATIVE for worrisome rashes, moles, or lesions  Neuro: NEGATIVE for weakness of the left knee  MSK: see HPI  Additional 10 point ROS is negative other than symptoms noted above and in HPI    Objective:  /85  Ht 5' 1.25\" (1.556 m)  Wt 174 lb (78.9 kg)  BMI 32.61 kg/m2  General: healthy, alert, obese, and in mild distress  Skin: no suspicious lesions or rashes  Psych: mentation appears normal and affect normal/bright  HEENT: no scleral icterus  CV: no pedal edema  Resp: normal respiratory effort without conversational dyspnea   Neuro: motor strength as noted below  Lymph: no palpable lymphadenopathy    MSK:    LEFT KNEE  Inspection:    Swelling present without erythrema or ecchymosis  Palpation:    Tender about the medial tibial plateau and pes anserine bursa    Not tender over the patella tendon, quadriceps insertion, lateral " joint line, medial joint line, lateral tibial plateau, or lateral femoral condyle    Small effusion is present    Patellofemoral crepitus is present  Active Range of Motion:     -80 extension   Passive Range of Motion:    1250 flexion   Strength:    Quadriceps 5/5; extensor mechanism intact  Special Tests:    Positive: Patellar apprehension, ballottement, Michael's, and Apley's    Negative: Patellar grind, MCL/valgus stress (0 & 30 deg), and bounce home    Contralateral knee: no overlying skin change, observable deformity, or effusion    Imaging:  No x-rays indicated during today's visit  Previous films were reviewed today, independent visualization of images was performed, and results were discussed with the patient  LEFT KNEE THREE VIEWS - 9/13/2017   IMPRESSION:   1. Mild degenerative changes as described above.     ASSESSMENT:  1. Chronic left knee pain - differential diagnoses includes medial meniscus degeneration/tear, pes anserine bursitis, etc  2. Primary left knee osteoarthrosis  3. Non-morbid obesity    PLAN:  1. Discussed importance of weight loss, including decreased oral intake and increased physical activity including aqua therapy, biking activities as tolerated, etc.  2. Activity modification as discussed, including limitation of activities that cause pain/discomfort.  3. Acetaminophen/Ibuprofen/ice as needed for improved pain control.  4. Call if interested in return to formal physical therapy, completion of a left knee intra-articular corticosteroid injection, or MRI of the left knee without contrast for further evaluation of current medical state.  5. Follow-up as needed for further evaluation/medical care.  Instructed to contact our office should the condition evolve or worsen.    Patient's conditions were thoroughly discussed during today's visit with greater than 50% of the visit spent counseling the patient with total time spent face-to-face with the patient being 15 minutes.    Estrada Hopkins DO,  [facility]    Disclaimer: This note consists of symbols derived from keyboarding, dictation and/or voice recognition software. As a result, there may be errors in the script that have gone undetected. Please consider this when interpreting information found in this chart.

## 2017-09-18 NOTE — Clinical Note
Dear Rajwinder Fernandez saw me at [facility] on Sep 18, 2017.  Please refer to the visit note at your convenience and feel free to contact me should you have any questions.  Sincerely,  Estrada Hopkins DO, [facility]

## 2017-09-18 NOTE — PATIENT INSTRUCTIONS
We addressed the following today:    1. Right knee arthritis/pain  2. Obesity    Activity modification as discussed  Topical Treatments: Ice or Heat  Over the counter medication: Acetaminophen (Tylenol) 1000 mg every 6 hours with food (Maximum of 3000 mg/day)  Ibuprofen (Advil) maximum of 800 mg four times a day with food  Other specific instructions: Call if interested in return to formal physical therapy, completion of a right knee steroid injection, or MRI of the right knee for further evaluation of current medical state  Follow up as needed for further evaluation/medical care (sooner if needed; call direct clinic number [[phone]] at any time with questions or concerns)

## 2017-09-18 NOTE — NURSING NOTE
"Chief Complaint   Patient presents with     Musculoskeletal Problem     L knee pain       Initial /85  Ht 5' 1.25\" (1.556 m)  Wt 174 lb (78.9 kg)  BMI 32.61 kg/m2 Estimated body mass index is 32.61 kg/(m^2) as calculated from the following:    Height as of this encounter: 5' 1.25\" (1.556 m).    Weight as of this encounter: 174 lb (78.9 kg).  Medication Reconciliation: complete     Manjinder Mathew, ATC      "

## 2017-09-18 NOTE — MR AVS SNAPSHOT
After Visit Summary   9/18/2017    Rajwinder Lama    MRN: [id number]           Patient Information     Date Of Birth          1955        Visit Information        Provider Department      9/18/2017 8:30 AM Estrada Hopkins DO; M[facility]IL[facility]        Today's Diagnoses     Chronic pain of left knee    -  1    Primary osteoarthritis of left knee        Non morbid obesity, unspecified obesity type          Care Instructions    We addressed the following today:    1. Right knee arthritis/pain  2. Obesity    Activity modification as discussed  Topical Treatments: Ice or Heat  Over the counter medication: Acetaminophen (Tylenol) 1000 mg every 6 hours with food (Maximum of 3000 mg/day)  Ibuprofen (Advil) maximum of 800 mg four times a day with food  Other specific instructions: Call if interested in return to formal physical therapy, completion of a right knee steroid injection, or MRI of the right knee for further evaluation of current medical state  Follow up as needed for further evaluation/medical care (sooner if needed; call direct clinic number [[phone]] at any time with questions or concerns)              Follow-ups after your visit        Your next 10 appointments already scheduled     Sep 20, 2017  9:40 AM CDT   MEDSPINE NEW with Estrada Hopkins DO   [facility] ([facility])    [address]  [address]  [facility] [address]   [phone]            Sep 22, 2017 10:10 AM CDT   YOBANY Spine with Jenny Kebede PT   [facility] [place] ([facility] [place]  )    [address] [place] [address]  [address]  [facility] [address]   [phone]            Sep 29, 2017 10:10 AM CDT   YOBANY Spine with Jenny Kebede PT   [facility] [place] ([facility] [place]  )    [address] [place] [address]  [address]  [facility] [address]   [phone]            Oct 06, 2017 10:10 AM CDT   YOBANY Spine with Jenny Kebede PT  "  [place] [facility] [place] ([facility] [place]  )    [address]  [address]  [place] [address]   [phone]            Oct 27, 2017  9:00 AM CDT   Colposcopy with Nichole Eli MD   [facility] [place] ([facility])    [address]  [address]  [place] [address] [phone]              Who to contact     If you have questions or need follow up information about today's clinic visit or your schedule please contact [facility] directly at [phone].  Normal or non-critical lab and imaging results will be communicated to you by ebridgehart, letter or phone within 4 business days after the clinic has received the results. If you do not hear from us within 7 days, please contact the clinic through ebridgehart or phone. If you have a critical or abnormal lab result, we will notify you by phone as soon as possible.  Submit refill requests through Search Initiatives or call your pharmacy and they will forward the refill request to us. Please allow 3 business days for your refill to be completed.          Additional Information About Your Visit        ebridgehart Information     Search Initiatives lets you send messages to your doctor, view your test results, renew your prescriptions, schedule appointments and more. To sign up, go to [email or web address] . Click on \"Log in\" on the left side of the screen, which will take you to the Welcome page. Then click on \"Sign up Now\" on the right side of the page.     You will be asked to enter the access code listed below, as well as some personal information. Please follow the directions to create your username and password.     Your access code is: [email or web address]  Expires: 10/30/2017 10:48 AM     Your access code will  in 90 days. If you need help or a new code, please call your [place] clinic or [phone].        Care EveryWhere ID     This is your Care EveryWhere ID. This could be used by other organizations to " "access your [place] medical records  [id number]        Your Vitals Were     Height BMI (Body Mass Index)                5' 1.25\" (1.556 m) 32.61 kg/m2           Blood Pressure from Last 3 Encounters:   09/18/17 158/85   09/13/17 154/70   08/01/17 138/78    Weight from Last 3 Encounters:   09/18/17 174 lb (78.9 kg)   09/13/17 174 lb 3.2 oz (79 kg)   08/01/17 173 lb 8 oz (78.7 kg)              Today, you had the following     No orders found for display       Primary Care Provider Fax #    [facility] [place] [phone]       No address on file        Equal Access to Services     TRACEY PEÑA : Srinath Gómez, jose manuel presley, xiomara kaalmakimberly sparks, hanane rosales. So [facility] [phone].    ATENCIÓN: Si habla español, tiene a jaramillo disposición servicios gratuitos de asistencia lingüística. Llame al [phone].    We comply with applicable federal civil rights laws and [place] laws. We do not discriminate on the basis of race, color, national origin, age, disability sex, sexual orientation or gender identity.            Thank you!     Thank you for choosing [facility] SP[facility]  for your care. Our goal is always to provide you with excellent care. Hearing back from our patients is one way we can continue to improve our services. Please take a few minutes to complete the written survey that you may receive in the mail after your visit with us. Thank you!             Your Updated Medication List - Protect others around you: Learn how to safely use, store and throw away your medicines at www.disposemymeds.org.          This list is accurate as of: 9/18/17  9:27 AM.  Always use your most recent med list.                   Brand Name Dispense Instructions for use Diagnosis    HYDROcodone-acetaminophen 5-325 MG per tablet    NORCO    15 tablet    Take 1 tablet by mouth every 4 hours as needed for pain    Chronic pain of left knee, Chronic right-sided low back " pain with right-sided sciatica       MULTIVITAMIN PO

## 2017-09-20 ENCOUNTER — RADIANT APPOINTMENT (OUTPATIENT)
Dept: GENERAL RADIOLOGY | Facility: CLINIC | Age: 62
End: 2017-09-20
Attending: PHYSICAL MEDICINE & REHABILITATION
Payer: COMMERCIAL

## 2017-09-20 ENCOUNTER — OFFICE VISIT (OUTPATIENT)
Dept: ORTHOPEDICS | Facility: CLINIC | Age: 62
End: 2017-09-20
Payer: COMMERCIAL

## 2017-09-20 VITALS
SYSTOLIC BLOOD PRESSURE: 145 MMHG | BODY MASS INDEX: 32.85 KG/M2 | HEIGHT: 61 IN | WEIGHT: 174 LBS | DIASTOLIC BLOOD PRESSURE: 76 MMHG

## 2017-09-20 DIAGNOSIS — G89.29 CHRONIC RIGHT-SIDED LOW BACK PAIN WITH RIGHT-SIDED SCIATICA: ICD-10-CM

## 2017-09-20 DIAGNOSIS — M54.17 LUMBOSACRAL RADICULITIS: Primary | ICD-10-CM

## 2017-09-20 DIAGNOSIS — M54.41 CHRONIC RIGHT-SIDED LOW BACK PAIN WITH RIGHT-SIDED SCIATICA: ICD-10-CM

## 2017-09-20 DIAGNOSIS — M51.369 LUMBAR DEGENERATIVE DISC DISEASE: ICD-10-CM

## 2017-09-20 PROCEDURE — 99244 OFF/OP CNSLTJ NEW/EST MOD 40: CPT | Performed by: PHYSICAL MEDICINE & REHABILITATION

## 2017-09-20 PROCEDURE — 72100 X-RAY EXAM L-S SPINE 2/3 VWS: CPT

## 2017-09-20 NOTE — MR AVS SNAPSHOT
After Visit Summary   9/20/2017    Rajwinder Lama    MRN: [id number]           Patient Information     Date Of Birth          1955        Visit Information        Provider Department      9/20/2017 9:30 AM Estrada Hopkins DO; M[facility]        Today's Diagnoses     Lumbar degenerative disc disease    -  1    Lumbosacral radiculitis          Care Instructions    We addressed the following today:    1. Right lumbosacral radiculitis/arthritis    Activity modification as discussed  Home exercise program as instructed  Topical Treatments: Ice or heat  Over the counter medication: Acetaminophen (Tylenol) 1000 mg every 6 hours with food (Maximum of 3000 mg/day)  Ibuprofen (Advil) maximum of 800 mg four times a day with food  MRI at [facility] - call [phone] to schedule  Follow up 1-2 business days after completion of further diagnostic imaging for discussion of results/further medical care (sooner if needed; call direct clinic number [[phone]] at any time with questions or concerns)              Follow-ups after your visit        Your next 10 appointments already scheduled     Sep 22, 2017 10:10 AM CDT   YOBANY Spine with Jenny Kebede PT   [place] [facility] [place] ([facility] [place]  )    [address] [place] [address]  [address]  [place] [address]   [phone]            Sep 29, 2017 10:10 AM CDT   YOBANY Spine with Jenny Kebede PT   [facility] [place] ([facility] [place]  )    [address] [place] [address]  [address]  [place] [address]   [phone]            Oct 06, 2017 10:10 AM CDT   YOBANY Spine with Jenny Kebede PT   [facility] [place] ([facility] [place]  )    [address] [place] [address]  [address]  [place] [address]   [phone]            Oct 27, 2017  9:00 AM CDT   Colposcopy with Nichole Eli MD   [facility] [place] ([facility])    [address] [place] [address]  [address]  [place]  "[id number]   [phone]              Future tests that were ordered for you today     Open Future Orders        Priority Expected Expires Ordered    MR Lumbar Spine w/o Contrast Routine 2017            Who to contact     If you have questions or need follow up information about today's clinic visit or your schedule please contact [facility] MEDIC[facility] directly at [phone].  Normal or non-critical lab and imaging results will be communicated to you by Locassahart, letter or phone within 4 business days after the clinic has received the results. If you do not hear from us within 7 days, please contact the clinic through plistat or phone. If you have a critical or abnormal lab result, we will notify you by phone as soon as possible.  Submit refill requests through CompareAway or call your pharmacy and they will forward the refill request to us. Please allow 3 business days for your refill to be completed.          Additional Information About Your Visit        CompareAway Information     CompareAway lets you send messages to your doctor, view your test results, renew your prescriptions, schedule appointments and more. To sign up, go to [email or web address] . Click on \"Log in\" on the left side of the screen, which will take you to the Welcome page. Then click on \"Sign up Now\" on the right side of the page.     You will be asked to enter the access code listed below, as well as some personal information. Please follow the directions to create your username and password.     Your access code is: [email or web address]  Expires: 10/30/2017 10:48 AM     Your access code will  in 90 days. If you need help or a new code, please call your [place] clinic or [phone].        Care EveryWhere ID     This is your Care EveryWhere ID. This could be used by other organizations to access your [place] medical records  [id number]        Your Vitals Were     Height BMI (Body Mass Index)                5' " "1.25\" (1.556 m) 32.61 kg/m2           Blood Pressure from Last 3 Encounters:   09/20/17 145/76   09/18/17 158/85   09/13/17 154/70    Weight from Last 3 Encounters:   09/20/17 174 lb (78.9 kg)   09/18/17 174 lb (78.9 kg)   09/13/17 174 lb 3.2 oz (79 kg)               Primary Care Provider Fax #    L[facility] [phone]       No address on file        Equal Access to Services     TRACEY PEÑA : Hadii estevan ku hadasho Soomaali, waaxda luqadaha, qaybta kaalmada adeegyada, hanane payton . So [facility] [phone].    ATENCIÓN: Si habla español, tiene a jaramillo disposición servicios gratuitos de asistencia lingüística. Llame al [phone].    We comply with applicable federal civil rights laws and [place] laws. We do not discriminate on the basis of race, color, national origin, age, disability sex, sexual orientation or gender identity.            Thank you!     Thank you for choosing [facility]TS [facility]  for your care. Our goal is always to provide you with excellent care. Hearing back from our patients is one way we can continue to improve our services. Please take a few minutes to complete the written survey that you may receive in the mail after your visit with us. Thank you!             Your Updated Medication List - Protect others around you: Learn how to safely use, store and throw away your medicines at www.disposemymeds.org.          This list is accurate as of: 9/20/17 10:21 AM.  Always use your most recent med list.                   Brand Name Dispense Instructions for use Diagnosis    HYDROcodone-acetaminophen 5-325 MG per tablet    NORCO    15 tablet    Take 1 tablet by mouth every 4 hours as needed for pain    Chronic pain of left knee, Chronic right-sided low back pain with right-sided sciatica       MULTIVITAMIN PO             "

## 2017-09-20 NOTE — PATIENT INSTRUCTIONS
We addressed the following today:    1. Right lumbosacral radiculitis/arthritis    Activity modification as discussed  Home exercise program as instructed  Topical Treatments: Ice or heat  Over the counter medication: Acetaminophen (Tylenol) 1000 mg every 6 hours with food (Maximum of 3000 mg/day)  Ibuprofen (Advil) maximum of 800 mg four times a day with food  MRI at [facility] - call [phone] to schedule  Follow up 1-2 business days after completion of further diagnostic imaging for discussion of results/further medical care (sooner if needed; call direct clinic number [[phone]] at any time with questions or concerns)

## 2017-09-20 NOTE — PROGRESS NOTES
" [facility]   Clinic Visit s Sep 20, 2017    Subjective:  Rajwinder Lama is a 62 year old female who is seen in consultation at the request of Ms. Hart for evaluation of chronic right sided low back pain with radiation. Rajwinder Lama is accompanied today by her daughter and an .    Symptoms began 4 months ago.  Reports insidious onset without acute precipitating event.  Reports sharp, numb, and radiating midline low back pain with radiation present down the right lateral thigh to the lateral/posterior calf regions.  Pain is 9/10 in maximal severity and 3/10 currently.  Symptoms are generally worse with sitting more than 1 hour, standing, and with walking more than 100 yards and better with physical therapy/home exercises.  Other treatment has consisted of Norco with good relief.  Associated symptoms include denies any bowel/bladder dysfunction or saddle anesthesia. Notes intermittent weakness of the lower extremities.  Denies any previous low back injuries/surgeries.    Patient's past medical, surgical, social, and family histories are reviewed today.  There are no significant contributory medical issues    Review of Systems:  Constitutional: NEGATIVE for fever, chills, or change in weight  Skin: NEGATIVE for worrisome rashes, moles, or lesions  Neuro: POSITIVE for intermittent weakness of the lower extremities  MSK: see HPI  Additional 10 point ROS is negative other than symptoms noted above and in HPI    Objective:  /76  Ht 5' 1.25\" (1.556 m)  Wt 174 lb (78.9 kg)  BMI 32.61 kg/m2  General: healthy, alert, obese, and in no acute distress  Skin: no suspicious lesions or rashes  Psych: mentation appears normal and affect normal/bright  HEENT: no scleral icterus  CV: no pedal edema  Resp: normal respiratory effort without conversational dyspnea   Neuro: sensory exam is within normal limits.  Motor strength as noted below  Lymph: no palpable " lymphadenopathy    MSK:    THORACIC/LUMBAR SPINE  Inspection:    No redness, swelling, overlying skin change, or gross deformity/asymmetry  Palpation:    No tenderness over the lumbar spinous processes, left para lumbar muscles, right para lumbar muscles, left SI joint, right SI joint, left sciatic notch, or right sciatic notch  Range of Motion:     Lumbar flexion within normal limits with pain    Lumbar extension within normal limits  Strength:    5/5 - quadriceps, hamstrings, tibialis anterior, gastrocsoleus, and extensor hallicus longus  Special Tests:    Positive: Straight leg raise (right) and slump test (right)    Negative: SI joint compression (bilateral) and ANNY (right)    Imaging:  Lumbar spine x-rays were ordered, independent visualization of images was performed, and interpreted in the office today  Impression:   1. At least mild degenerative disc space narrowing at L4-5 and posteriorly at L5-S1.   2. No acute bony abnormality.    ASSESSMENT:  1. Right lumbosacral radiculitis  2. Lumbar degenerative disc disease    PLAN:  1. Activity modification as discussed, including limitation of activities that cause pain/discomfort.  2. Acetaminophen/Ibuprofen/ice/heat as needed for improved pain control.  3. MRI of the lumbar spine without contrast for further evaluation of current medical state.  4. Continue with pain-free home exercise program as previously prescribed during formal physical therapy.  5. Follow up 1-2 business days after completion of further diagnostic imaging for discussion of results/further medical care.  Consider initiation of Gabapentin, further formal physical therapy, etc as deemed moving forward.  Instructed to contact our office should the condition evolve or worsen, or should any new/progressive neurologic symptoms develop.    Patient's conditions were thoroughly discussed during today's visit with greater than 50% of the visit spent counseling the patient with total time spent  face-to-face with the patient being 15 minutes.    Estrada Hopkins DO, CAQSM  [facility]    Disclaimer: This note consists of symbols derived from keyboarding, dictation and/or voice recognition software. As a result, there may be errors in the script that have gone undetected. Please consider this when interpreting information found in this chart.

## 2017-09-20 NOTE — Clinical Note
Dear Rajwinder Fernandez [place] [facility] [place].  Please refer to the visit note at your convenience and feel free to contact me should you have any questions.  Sincerely,  Estrada Hopkins DO, [facility]

## 2017-09-20 NOTE — NURSING NOTE
"Chief Complaint   Patient presents with     Musculoskeletal Problem     chronic low back pain with radiation       Initial /76  Ht 5' 1.25\" (1.556 m)  Wt 174 lb (78.9 kg)  BMI 32.61 kg/m2 Estimated body mass index is 32.61 kg/(m^2) as calculated from the following:    Height as of this encounter: 5' 1.25\" (1.556 m).    Weight as of this encounter: 174 lb (78.9 kg).  Medication Reconciliation: complete     Manjinder Mathew, ATC      "

## 2017-09-22 ENCOUNTER — THERAPY VISIT (OUTPATIENT)
Dept: PHYSICAL THERAPY | Facility: CLINIC | Age: 62
End: 2017-09-22
Payer: COMMERCIAL

## 2017-09-22 DIAGNOSIS — M25.562 KNEE PAIN, LEFT: ICD-10-CM

## 2017-09-22 DIAGNOSIS — M54.50 LUMBAGO: ICD-10-CM

## 2017-09-22 PROCEDURE — 97112 NEUROMUSCULAR REEDUCATION: CPT | Mod: GP | Performed by: PHYSICAL THERAPIST

## 2017-09-22 PROCEDURE — 97110 THERAPEUTIC EXERCISES: CPT | Mod: GP | Performed by: PHYSICAL THERAPIST

## 2017-09-26 ENCOUNTER — HOSPITAL ENCOUNTER (OUTPATIENT)
Dept: MRI IMAGING | Facility: CLINIC | Age: 62
Discharge: HOME | End: 2017-09-26
Attending: PHYSICAL MEDICINE & REHABILITATION | Admitting: PHYSICAL MEDICINE & REHABILITATION
Payer: COMMERCIAL

## 2017-09-26 DIAGNOSIS — M51.369 LUMBAR DEGENERATIVE DISC DISEASE: ICD-10-CM

## 2017-09-26 DIAGNOSIS — M54.17 LUMBOSACRAL RADICULITIS: ICD-10-CM

## 2017-09-26 PROCEDURE — 72148 MRI LUMBAR SPINE W/O DYE: CPT

## 2017-09-29 ENCOUNTER — THERAPY VISIT (OUTPATIENT)
Dept: PHYSICAL THERAPY | Facility: CLINIC | Age: 62
End: 2017-09-29
Payer: COMMERCIAL

## 2017-09-29 DIAGNOSIS — M54.50 LUMBAGO: ICD-10-CM

## 2017-09-29 DIAGNOSIS — M25.562 KNEE PAIN, LEFT: ICD-10-CM

## 2017-09-29 PROCEDURE — 97112 NEUROMUSCULAR REEDUCATION: CPT | Mod: GP | Performed by: PHYSICAL THERAPIST

## 2017-09-29 PROCEDURE — 97110 THERAPEUTIC EXERCISES: CPT | Mod: GP | Performed by: PHYSICAL THERAPIST

## 2017-10-01 ENCOUNTER — TELEPHONE (OUTPATIENT)
Dept: ORTHOPEDICS | Facility: CLINIC | Age: 62
End: 2017-10-01

## 2017-10-05 NOTE — TELEPHONE ENCOUNTER
Via Thai , left message on daughterClaire's cell to call back to make an appointment to come in to discuss MRI results.     ROXIE Burr RN

## 2017-10-06 ENCOUNTER — THERAPY VISIT (OUTPATIENT)
Dept: PHYSICAL THERAPY | Facility: CLINIC | Age: 62
End: 2017-10-06
Payer: COMMERCIAL

## 2017-10-06 DIAGNOSIS — M54.50 LUMBAGO: ICD-10-CM

## 2017-10-06 DIAGNOSIS — M25.562 KNEE PAIN, LEFT: ICD-10-CM

## 2017-10-06 PROCEDURE — 97112 NEUROMUSCULAR REEDUCATION: CPT | Mod: GP | Performed by: PHYSICAL THERAPIST

## 2017-10-06 PROCEDURE — 97110 THERAPEUTIC EXERCISES: CPT | Mod: GP | Performed by: PHYSICAL THERAPIST

## 2017-10-06 PROCEDURE — 97530 THERAPEUTIC ACTIVITIES: CPT | Mod: GP | Performed by: PHYSICAL THERAPIST

## 2017-10-06 NOTE — PROGRESS NOTES
Subjective:    HPI                    Objective:    System    Physical Exam    General     ROS    Assessment/Plan:      PROGRESS  REPORT    Progress reporting period is from 8/15/2017 to 10/6/2017.       SUBJECTIVE  Subjective changes noted by patient:  Back pain has resolved (per pt report; however, SHAREE indicates some remains), L knee pain is improving.  Subjective: Reports her back continues to feel fine. Does have some medial L knee pain but this is improving. Exercises are going well, no pain with them.    Current pain level is 4/10 (L knee).     Previous pain level was  7/10.   Changes in function:  Yes (See Goal flowsheet attached for changes in current functional level)  Adverse reaction to treatment or activity: None    OBJECTIVE  Changes noted in objective findings:  Yes,   Objective:   AROM Lumbar Flx: toes, Ext: 100%,   Strength L Knee Flx: 5/5, Ext: 5/5, Hip Flx L: -5/5, Hip Abd L: -5/5, R: 5/5, Hip Ext L: -5/5, R: +4/5;   Pelvic Control w/ Bridge March: fair, mild B hip drop;   4in Lateral Step Down: fair control, difficulty controlling eccentric lowering, mild ant knee excursion;   Partial Squat w/ Rail: good form, no pain, crepitus noted     ASSESSMENT/PLAN  Updated problem list and treatment plan: Diagnosis 1:  L knee pain  Pain -  hot/cold therapy, manual therapy, education and home program  Decreased strength - therapeutic exercise and therapeutic activities  Decreased proprioception - neuro re-education and therapeutic activities  Impaired muscle performance - neuro re-education  Decreased function - therapeutic activities  Diagnosis 2: low back pain  Pain -  hot/cold therapy, manual therapy, education and home program  Decreased function - therapeutic activities  STG/LTGs have been met or progress has been made towards goals:  Yes (See Goal flow sheet completed today.)  Assessment of Progress: The patient's condition is improving.  Patient is meeting short term goals and is progressing towards  long term goals.  Self Management Plans:  Patient has been instructed in a home treatment program.  I have re-evaluated this patient and find that the nature, scope, duration and intensity of the therapy is appropriate for the medical condition of the patient.  [place] continues to require the following intervention to meet STG and LTG's:  PT    Recommendations:  This patient would benefit from continued therapy.     Frequency:  2 X a month, once daily  Duration:  for 4 weeks          Please refer to the daily flowsheet for treatment today, total treatment time and time spent performing 1:1 timed codes.

## 2017-10-20 ENCOUNTER — THERAPY VISIT (OUTPATIENT)
Dept: PHYSICAL THERAPY | Facility: CLINIC | Age: 62
End: 2017-10-20
Payer: COMMERCIAL

## 2017-10-20 DIAGNOSIS — M54.50 LUMBAGO: ICD-10-CM

## 2017-10-20 DIAGNOSIS — M25.562 KNEE PAIN, LEFT: ICD-10-CM

## 2017-10-20 PROCEDURE — 97110 THERAPEUTIC EXERCISES: CPT | Mod: GP | Performed by: PHYSICAL THERAPIST

## 2017-10-20 PROCEDURE — 97530 THERAPEUTIC ACTIVITIES: CPT | Mod: GP | Performed by: PHYSICAL THERAPIST

## 2017-10-20 PROCEDURE — 97112 NEUROMUSCULAR REEDUCATION: CPT | Mod: GP | Performed by: PHYSICAL THERAPIST

## 2017-10-30 ENCOUNTER — OFFICE VISIT (OUTPATIENT)
Dept: ORTHOPEDICS | Facility: CLINIC | Age: 62
End: 2017-10-30
Payer: COMMERCIAL

## 2017-10-30 VITALS
DIASTOLIC BLOOD PRESSURE: 82 MMHG | HEIGHT: 61 IN | WEIGHT: 175 LBS | BODY MASS INDEX: 33.04 KG/M2 | SYSTOLIC BLOOD PRESSURE: 172 MMHG

## 2017-10-30 DIAGNOSIS — G89.29 CHRONIC PAIN OF LEFT KNEE: Primary | ICD-10-CM

## 2017-10-30 DIAGNOSIS — M25.562 CHRONIC PAIN OF LEFT KNEE: Primary | ICD-10-CM

## 2017-10-30 DIAGNOSIS — E66.9 NON MORBID OBESITY, UNSPECIFIED OBESITY TYPE: ICD-10-CM

## 2017-10-30 PROCEDURE — 99213 OFFICE O/P EST LOW 20 MIN: CPT | Performed by: PHYSICAL MEDICINE & REHABILITATION

## 2017-10-30 NOTE — MR AVS SNAPSHOT
After Visit Summary   10/30/2017    Rajwinder Lama    MRN: [id number]           Patient Information     Date Of Birth          1955        Visit Information        Provider Department      10/30/2017 2:30 PM Estrada Hopkins DO; LANGUAGE [facility] SP[facility]TS MEDICINE        Today's Diagnoses     Chronic pain of left knee    -  1    Non morbid obesity, unspecified obesity type          Care Instructions    We addressed the following today:    1. Chronic left knee pain  2. Obesity    Activity modification as discussed  Home exercise program as instructed  Physical therapy: [facility] - [phone]  Topical Treatments: Ice  Over the counter medication: Acetaminophen (Tylenol) 1000 mg every 6 hours with food (Maximum of 3000 mg/day)  Ibuprofen (Advil) maximum of 800 mg four times a day with food  Other specific instructions: Discussed importance of weight loss, including decreased oral intake and increased physical activity including aqua therapy, biking activities as tolerated, etc  Follow up as needed for further evaluation/medical care (sooner if needed; call direct clinic number [[phone]] at any time with questions or concerns)              Follow-ups after your visit        Additional Services     YOBANY PT, HAND, AND CHIROPRACTIC REFERRAL       **This order will print in the [facility] Scheduling Office**    Physical Therapy, Hand Therapy and Chiropractic Care are available through:    *[facility]  *[facility]  *[facility]    Call one number to schedule at any of the above locations: [phone].    Your provider has referred you to: Physical Therapy at [facility] or [facility]    Indication/Reason for Referral: Knee Pain  Therapy Orders: Evaluate and Treat  Special Programs: None  Special Request: None    Dg Conte      Additional Comments for the Therapist or Chiropractor: Formal physical therapy - exercises to include  quadriceps/hamstring/calf stretching/strengthening with range of motion exercises, manual therapy, hip mobilizations, and gait/balance training with use of modalities as needed with home exercise prescription.    Please be aware that coverage of these services is subject to the terms and limitations of your health insurance plan.  Call member services at your health plan with any benefit or coverage questions.      Please bring the following to your appointment:    *Your personal calendar for scheduling future appointments  *Comfortable clothing                  Follow-up notes from your care team     Return if symptoms worsen or fail to improve.      Your next 10 appointments already scheduled     Nov 01, 2017 10:40 AM SAMANTHAT   YOBANY Spine with Jenny Kebede PT   [facility] (YOBANY Jill  )    [address]  [address]  [place] [address]   [phone]              Who to contact     If you have questions or need follow up information about today's clinic visit or your schedule please contact [facility]IC[facility] directly at [phone].  Normal or non-critical lab and imaging results will be communicated to you by sim4techart, letter or phone within 4 business days after the clinic has received the results. If you do not hear from us within 7 days, please contact the clinic through sim4techart or phone. If you have a critical or abnormal lab result, we will notify you by phone as soon as possible.  Submit refill requests through Shoulder Tap or call your pharmacy and they will forward the refill request to us. Please allow 3 business days for your refill to be completed.          Additional Information About Your Visit        Shoulder Tap Information     Shoulder Tap gives you secure access to your electronic health record. If you see a primary care provider, you can also send messages to your care team and make appointments. If you have questions, please call your primary care clinic.  If  "you do not have a primary care provider, please call [phone] and they will assist you.        Care EveryWhere ID     This is your Care EveryWhere ID. This could be used by other organizations to access your [place] medical records  [id number]        Your Vitals Were     Height BMI (Body Mass Index)                5' 1.25\" (1.556 m) 32.8 kg/m2           Blood Pressure from Last 3 Encounters:   10/30/17 172/82   09/20/17 145/76   09/18/17 158/85    Weight from Last 3 Encounters:   10/30/17 175 lb (79.4 kg)   09/20/17 174 lb (78.9 kg)   09/18/17 174 lb (78.9 kg)              We Performed the Following     YOBANY PT, HAND, AND CHIROPRACTIC REFERRAL        Primary Care Provider Office Phone # Fax #    [facility] [phone] [address] [address]        Equal Access to Services     THERON PEÑA : Hadii aad ku hadasho Soomaali, waaxda luqadaha, qaybta kaalmada adeegyada, waxay idiin haylancen prabhjot payton . So [facility] [phone].    ATENCIÓN: Si habla español, tiene a jaramillo disposición servicios gratuitos de asistencia lingüística. Llame al [phone].    We comply with applicable federal civil rights laws and [place] laws. We do not discriminate on the basis of race, color, national origin, age, disability, sex, sexual orientation, or gender identity.            Thank you!     Thank you for choosing [facility] SPORTS [facility]  for your care. Our goal is always to provide you with excellent care. Hearing back from our patients is one way we can continue to improve our services. Please take a few minutes to complete the written survey that you may receive in the mail after your visit with us. Thank you!             Your Updated Medication List - Protect others around you: Learn how to safely use, store and throw away your medicines at www.disposemymeds.org.          This list is accurate as of: 10/30/17  3:20 PM.  Always use your most recent med list. "                   Brand Name Dispense Instructions for use Diagnosis    HYDROcodone-acetaminophen 5-325 MG per tablet    NORCO    15 tablet    Take 1 tablet by mouth every 4 hours as needed for pain    Chronic pain of left knee, Chronic right-sided low back pain with right-sided sciatica       MULTIVITAMIN PO

## 2017-10-30 NOTE — PROGRESS NOTES
" [facility]   Follow-up Visit s Oct 30, 2017    Subjective:  Rajwinder Lama is a 62 year old female who is seen in follow up for evaluation of chronic left knee pain.  Patient is seen in the presence of an . Last visit was on 9/20/2017.  Since that time, symptoms have been improved. Has continued with her pain free home exercises as prescribed during formal physical therapy.  Has not been using any medications for improvement of symptoms.    Notes left medial knee pain.  Symptoms are worse with nothing in particular.  Denies any consistent weakness, locking, popping, catching, clicking, bruising, swelling, numbness, tingling, or weakness of the left lower extremity.  Denies any trauma/falls since last clinical visit.    Patient's past medical, surgical, social, and family histories are reviewed today    Objective:  /82  Ht 5' 1.25\" (1.556 m)  Wt 175 lb (79.4 kg)  BMI 32.8 kg/m2  General: healthy, alert, and in no distress    Imaging:  No x-rays indicated during today's visit  Previous films were reviewed today and results were discussed with the patient    ASSESSMENT:  1. Chronic left knee pain - differential diagnoses includes medial meniscus degeneration/tear, pes anserine bursitis, etc  2. Non-morbid obesity     PLAN:  1. Discussed importance of weight loss, including decreased oral intake and increased physical activity including aqua therapy, biking activities as tolerated, etc.  2. Activity modification as discussed, including limitation of activities that cause pain/discomfort.  3. Acetaminophen/Ibuprofen/ice as needed for improved pain control.  4. Continue with formal physical therapy/pain-free home exercise program as previously prescribed during physical therapy.  5. Follow-up as needed for further evaluation/medical care.  Consider completion of a left knee intra-articular corticosteroid injection, MRI of the left knee without contrast, etc for further " evaluation of current medical state.  Instructed to contact our office should the condition evolve or worsen.    Patient's conditions were thoroughly discussed during today's visit with greater than 50% of the visit spent counseling the patient with total time spent face-to-face with the patient being 10 minutes.    Estrada Hopkins DO, [facility]    Disclaimer: This note consists of symbols derived from keyboarding, dictation and/or voice recognition software. As a result, there may be errors in the script that have gone undetected. Please consider this when interpreting information found in this chart.

## 2017-10-30 NOTE — PATIENT INSTRUCTIONS
We addressed the following today:    1. Chronic left knee pain  2. Obesity    Activity modification as discussed  Home exercise program as instructed  Physical therapy: [facility] - [phone]  Topical Treatments: Ice  Over the counter medication: Acetaminophen (Tylenol) 1000 mg every 6 hours with food (Maximum of 3000 mg/day)  Ibuprofen (Advil) maximum of 800 mg four times a day with food  Other specific instructions: Discussed importance of weight loss, including decreased oral intake and increased physical activity including aqua therapy, biking activities as tolerated, etc  Follow up as needed for further evaluation/medical care (sooner if needed; call direct clinic number [[phone]] at any time with questions or concerns)

## 2017-10-30 NOTE — NURSING NOTE
"Chief Complaint   Patient presents with     Back Pain       Initial /82  Ht 5' 1.25\" (1.556 m)  Wt 175 lb (79.4 kg)  BMI 32.8 kg/m2 Estimated body mass index is 32.8 kg/(m^2) as calculated from the following:    Height as of this encounter: 5' 1.25\" (1.556 m).    Weight as of this encounter: 175 lb (79.4 kg).  Medication Reconciliation: complete     Luciana Lock, ATC    "

## 2017-11-01 ENCOUNTER — THERAPY VISIT (OUTPATIENT)
Dept: PHYSICAL THERAPY | Facility: CLINIC | Age: 62
End: 2017-11-01
Payer: COMMERCIAL

## 2017-11-01 DIAGNOSIS — M54.50 LUMBAGO: ICD-10-CM

## 2017-11-01 DIAGNOSIS — M25.562 KNEE PAIN, LEFT: ICD-10-CM

## 2017-11-01 PROCEDURE — 97112 NEUROMUSCULAR REEDUCATION: CPT | Mod: GP | Performed by: PHYSICAL THERAPIST

## 2017-11-01 PROCEDURE — 97110 THERAPEUTIC EXERCISES: CPT | Mod: GP | Performed by: PHYSICAL THERAPIST

## 2017-11-01 NOTE — PROGRESS NOTES
Subjective:    HPI                    Objective:    System    Physical Exam    General     ROS    Assessment/Plan:      DISCHARGE REPORT    Progress reporting period is from 10/6/2017 to 11/1/2017.       SUBJECTIVE  Subjective changes noted by patient:  Feels the back and the L knee are doing really well. No report of pain today. Exercises are going well.     Current pain level is 0/10.     Previous pain level was  7/10.   Changes in function:  Yes (See Goal flowsheet attached for changes in current functional level)  Adverse reaction to treatment or activity: None    OBJECTIVE  Changes noted in objective findings:  Yes,   Objective:   AROM Lumbar Flx: floor, Ext: 100%, L Knee Flx: 140, Ext: 0,   Strength L Knee Flx: 5/5, Ext: 5/5, Hip Flx: 5/5, Ext: -5/5, Abd: -5/5;   Pelvic Control Bridge March: fair+;   TrA Contraction: good;   Squat: mild ant knee excursion     ASSESSMENT/PLAN  Updated problem list and treatment plan: Diagnosis 1:  L knee pain  Pain -  home program  Decreased strength - home program  Impaired muscle performance - home program  Diagnosis 2:  Low back pain   Impaired muscle performance - home program  STG/LTGs have been met or progress has been made towards goals:  Yes (See Goal flow sheet completed today.)  Assessment of Progress: The patient has met all of their long term goals.  Self Management Plans:  Patient is independent in a home treatment program.  I have re-evaluated this patient and find that the nature, scope, duration and intensity of the therapy is appropriate for the medical condition of the patient.  [place] continues to require the following intervention to meet STG and LTG's:  PT intervention is no longer required to meet STG/LTG.    Recommendations:  This patient is ready to be discharged from therapy and continue their home treatment program.  Encourage patient to continue strengthening by finding a local fitness center.    Please refer to the daily flowsheet for treatment  today, total treatment time and time spent performing 1:1 timed codes.

## 2017-12-15 ENCOUNTER — OFFICE VISIT (OUTPATIENT)
Dept: OBGYN | Facility: CLINIC | Age: 62
End: 2017-12-15
Payer: COMMERCIAL

## 2017-12-15 VITALS
DIASTOLIC BLOOD PRESSURE: 84 MMHG | SYSTOLIC BLOOD PRESSURE: 146 MMHG | WEIGHT: 172 LBS | BODY MASS INDEX: 32.23 KG/M2 | HEART RATE: 60 BPM

## 2017-12-15 DIAGNOSIS — R87.810 CERVICAL HIGH RISK HPV (HUMAN PAPILLOMAVIRUS) TEST POSITIVE: Primary | ICD-10-CM

## 2017-12-15 PROCEDURE — 88305 TISSUE EXAM BY PATHOLOGIST: CPT | Performed by: OBSTETRICS & GYNECOLOGY

## 2017-12-15 PROCEDURE — 57454 BX/CURETT OF CERVIX W/SCOPE: CPT | Performed by: OBSTETRICS & GYNECOLOGY

## 2017-12-15 NOTE — MR AVS SNAPSHOT
After Visit Summary   12/15/2017    Rajwinder Lama    MRN: [id number]           Patient Information     Date Of Birth          1955        Visit Information        Provider Department      12/15/2017 3:45 PM Sirisha Oakes MD; MULTILINGUAL WORD; RI PROC RM 2 [facility]        Today's Diagnoses     Cervical high risk HPV (human papillomavirus) test positive    -  1       Follow-ups after your visit        Who to contact     If you have questions or need follow up information about today's clinic visit or your schedule please contact [facility] directly at [phone].  Normal or non-critical lab and imaging results will be communicated to you by MyChart, letter or phone within 4 business days after the clinic has received the results. If you do not hear from us within 7 days, please contact the clinic through [email or web address].Mxhart or phone. If you have a critical or abnormal lab result, we will notify you by phone as soon as possible.  Submit refill requests through Platypi or call your pharmacy and they will forward the refill request to us. Please allow 3 business days for your refill to be completed.          Additional Information About Your Visit        MyChart Information     Platypi gives you secure access to your electronic health record. If you see a primary care provider, you can also send messages to your care team and make appointments. If you have questions, please call your primary care clinic.  If you do not have a primary care provider, please call [phone] and they will assist you.        Care EveryWhere ID     This is your Care EveryWhere ID. This could be used by other organizations to access your [place] medical records  [id number]        Your Vitals Were     Pulse BMI (Body Mass Index)                60 32.23 kg/m2           Blood Pressure from Last 3 Encounters:   12/15/17 146/84   10/30/17 172/82   09/20/17 145/76    Weight from Last 3  Encounters:   12/15/17 172 lb (78 kg)   10/30/17 175 lb (79.4 kg)   09/20/17 174 lb (78.9 kg)              We Performed the Following     COLP CERVIX/UPPER VAGINA W ENDOCERV CURETT     Surgical pathology exam        Primary Care Provider Office Phone # Fax #    Jigar [facility] [phone] [address] [address]        Equal Access to Services     TRACEY PEÑA : Hadii aad ku hadasho Soomaali, waaxda luqadaha, qaybta kaalmada adeegyada, waxay idiin hayaan adeeg jenniferjenaracely payton . So [facility] [phone].    ATENCIÓN: Si saritala tash, tiene a jaramillo disposición servicios gratuitos de asistencia lingüística. Rose Marieame al [phone].    We comply with applicable federal civil rights laws and [place] laws. We do not discriminate on the basis of race, color, national origin, age, disability, sex, sexual orientation, or gender identity.            Thank you!     Thank you for choosing [facility]  for your care. Our goal is always to provide you with excellent care. Hearing back from our patients is one way we can continue to improve our services. Please take a few minutes to complete the written survey that you may receive in the mail after your visit with us. Thank you!             Your Updated Medication List - Protect others around you: Learn how to safely use, store and throw away your medicines at www.disposemymeds.org.          This list is accurate as of: 12/15/17 11:59 PM.  Always use your most recent med list.                   Brand Name Dispense Instructions for use Diagnosis    HYDROcodone-acetaminophen 5-325 MG per tablet    NORCO    15 tablet    Take 1 tablet by mouth every 4 hours as needed for pain    Chronic pain of left knee, Chronic right-sided low back pain with right-sided sciatica       MULTIVITAMIN PO

## 2017-12-15 NOTE — NURSING NOTE
"Chief Complaint   Patient presents with     Colposcopy     pap 08/01/17 NIL - HPV 16 DNA       Initial /84 (BP Location: Left arm, Patient Position: Chair, Cuff Size: Adult Regular)  Pulse 60  Wt 172 lb (78 kg)  BMI 32.23 kg/m2 Estimated body mass index is 32.23 kg/(m^2) as calculated from the following:    Height as of 10/30/17: 5' 1.25\" (1.556 m).    Weight as of this encounter: 172 lb (78 kg).  Medication Reconciliation: complete     Nurse assisted visit.  Gricel Diaz MA.      "

## 2017-12-15 NOTE — PROGRESS NOTES
INDICATIONS:                                                    Rajwinder Lama is a 62 year old female who had a recent NIL pap.  HPV 16 positive.  No prior history of abnormal pap. Here today for colposcopy. Discussed indication, risks of infection and bleeding.    Is a pregnancy test required: No.  Was a consent obtained?  Yes    Lab Results   Component Value Date    PAP NIL 08/01/2017         PROCEDURE:                                                    /84 (BP Location: Left arm, Patient Position: Chair, Cuff Size: Adult Regular)  Pulse 60  Wt 172 lb (78 kg)  BMI 32.23 kg/m2   Cervix is stained with acetic acid and viewed colposcopically. Squamocolumnar junction is not visualized in it's entirity. punctation noted at 3 o'clock . Absent lugols uptake at 11 o'clock. Biopsy done at 5, 7, and 11 o'clock. Endocervical curretage Done       POST PROCEDURE:                                                      IMPRESSION: HPV    PLAN : Await the results of the biopsies.  Repeat pap in 12 months.  She  tolerated the procedure well. There were no complications. Patient was discharged in stable condition.    Patient advised to call the clinic if excessive bleeding, pelvic pain, or fever.     Follow-up plan based on pathology results.    20 minutes was spent counseling this patient regarding etiology and consequences of HR HPV exposure.    Sirisha Oakes MD

## 2017-12-18 LAB — COPATH REPORT: NORMAL

## 2017-12-19 NOTE — PROGRESS NOTES
Please call patient with high grade dysplasia on colposcopy. I'd like her to return to clinic to discuss further management.     Sirisha Oakes MD

## 2018-01-23 ENCOUNTER — OFFICE VISIT (OUTPATIENT)
Dept: OBGYN | Facility: CLINIC | Age: 63
End: 2018-01-23
Payer: COMMERCIAL

## 2018-01-23 VITALS
BODY MASS INDEX: 32.47 KG/M2 | HEIGHT: 61 IN | HEART RATE: 64 BPM | DIASTOLIC BLOOD PRESSURE: 84 MMHG | WEIGHT: 172 LBS | SYSTOLIC BLOOD PRESSURE: 140 MMHG

## 2018-01-23 DIAGNOSIS — R87.613 HIGH GRADE SQUAMOUS INTRAEPITHELIAL CERVICAL DYSPLASIA: Primary | ICD-10-CM

## 2018-01-23 PROCEDURE — 99214 OFFICE O/P EST MOD 30 MIN: CPT | Performed by: OBSTETRICS & GYNECOLOGY

## 2018-01-23 NOTE — MR AVS SNAPSHOT
After Visit Summary   1/23/2018    Rajwinder Lama    MRN: [id number]           Patient Information     Date Of Birth          1955        Visit Information        Provider Department      1/23/2018 1:45 PM Ora Hall MD; MULTILINGUAL WORD [facility]        Today's Diagnoses     High grade squamous intraepithelial cervical dysplasia    -  1       Follow-ups after your visit        Your next 10 appointments already scheduled     Feb 14, 2018  2:15 PM CST   Office Visit with Sirisha Oakes MD, RI PROC  2   [facility] ([facility])    [address]  [facility] [address]   [phone]           Bring a current list of meds and any records pertaining to this visit. For Physicals, please bring immunization records and any forms needing to be filled out. Please arrive 10 minutes early to complete paperwork.              Who to contact     If you have questions or need follow up information about today's clinic visit or your schedule please contact [facility] directly at [phone].  Normal or non-critical lab and imaging results will be communicated to you by Bubblihart, letter or phone within 4 business days after the clinic has received the results. If you do not hear from us within 7 days, please contact the clinic through Bubblihart or phone. If you have a critical or abnormal lab result, we will notify you by phone as soon as possible.  Submit refill requests through Kredits or call your pharmacy and they will forward the refill request to us. Please allow 3 business days for your refill to be completed.          Additional Information About Your Visit        Bubblihart Information     Kredits gives you secure access to your electronic health record. If you see a primary care provider, you can also send messages to your care team and make appointments. If you have questions, please call your primary care clinic.  If  "you do not have a primary care provider, please call [phone] and they will assist you.        Care EveryWhere ID     This is your Care EveryWhere ID. This could be used by other organizations to access your [place] medical records  [id number]        Your Vitals Were     Pulse Height Breastfeeding? BMI (Body Mass Index)          64 1.556 m (5' 1.25\") No 32.23 kg/m2         Blood Pressure from Last 3 Encounters:   01/23/18 140/84   12/15/17 146/84   10/30/17 172/82    Weight from Last 3 Encounters:   01/23/18 78 kg (172 lb)   12/15/17 78 kg (172 lb)   10/30/17 79.4 kg (175 lb)              Today, you had the following     No orders found for display       Primary Care Provider Office Phone # Fax #    [facility] [phone] [address] [facility] [address]        Equal Access to Services     TRACEY PEÑA : Hadii estevan ku hadasho Soomaali, waaxda luqadaha, qaybta kaalmada adeegyada, hanane payton . So [facility] [phone].    ATENCIÓN: Si habla español, tiene a jaramillo disposición servicios gratuitos de asistencia lingüística. Llame al [phone].    We comply with applicable federal civil rights laws and [place] laws. We do not discriminate on the basis of race, color, national origin, age, disability, sex, sexual orientation, or gender identity.            Thank you!     Thank you for choosing [facility]  for your care. Our goal is always to provide you with excellent care. Hearing back from our patients is one way we can continue to improve our services. Please take a few minutes to complete the written survey that you may receive in the mail after your visit with us. Thank you!             Your Updated Medication List - Protect others around you: Learn how to safely use, store and throw away your medicines at www.disposemymeds.org.          This list is accurate as of: 1/23/18  4:00 PM.  Always use your most recent med list. "                   Brand Name Dispense Instructions for use Diagnosis    HYDROcodone-acetaminophen 5-325 MG per tablet    NORCO    15 tablet    Take 1 tablet by mouth every 4 hours as needed for pain    Chronic pain of left knee, Chronic right-sided low back pain with right-sided sciatica       MULTIVITAMIN PO

## 2018-01-23 NOTE — NURSING NOTE
"Chief Complaint   Patient presents with     RECHECK     Discuss colposcopy results showing high grade (CIN2/3) abnormalities.       Initial /84  Pulse 64  Ht 1.556 m (5' 1.25\")  Wt 78 kg (172 lb)  Breastfeeding? No  BMI 32.23 kg/m2 Estimated body mass index is 32.23 kg/(m^2) as calculated from the following:    Height as of this encounter: 1.556 m (5' 1.25\").    Weight as of this encounter: 78 kg (172 lb).  Medication Reconciliation: complete   Pam Gan LPN      "

## 2018-01-23 NOTE — PROGRESS NOTES
SUBJECTIVE:                                                   Rajwinder Lama is a 63 year old female who presents to clinic today to follow up for cervical dysplasia. She had a pap showing NIL positive HPV 16 8/2017. Colposcopy 12/15: ectocervical biopsies ZO 2-3, ECC also ZO 2-3. She is here with a family member and  to discuss next steps. Her colposcopy was with Dr. Oakes. It is unclear why she was not scheduled back with Dr. Oakes, and she is not sure either.      Problem list and histories reviewed & adjusted, as indicated.  Additional history: as documented.    Patient Active Problem List   Diagnosis     Cervical high risk HPV (human papillomavirus) test positive     Focal lesion of iris     Past Surgical History:   Procedure Laterality Date     gall stone        Social History   Substance Use Topics     Smoking status: Never Smoker     Smokeless tobacco: Never Used     Alcohol use Yes      Comment: ethipian drink rarely      Problem (# of Occurrences) Relation (Name,Age of Onset)    Coronary Artery Disease (1) Mother    Hypertension (2) Mother, Father              Current Outpatient Prescriptions on File Prior to Visit:  Multiple Vitamins-Minerals (MULTIVITAMIN PO)    HYDROcodone-acetaminophen (NORCO) 5-325 MG per tablet Take 1 tablet by mouth every 4 hours as needed for pain (Patient not taking: Reported on 10/30/2017)     No current facility-administered medications on file prior to visit.   No Known Allergies    ROS:  5 point ROS negative except as noted above in HPI, including Gen., Resp., CV, GI &  system review.    OBJECTIVE:     No exam was necessary today as this was entirely a counseling appointment.    In-Clinic Test Results:  No results found for this or any previous visit (from the past 24 hour(s)).    ASSESSMENT/PLAN:                                                        ICD-10-CM    1. High grade squamous intraepithelial cervical dysplasia R87.613          She needs a LEEP,  which was discussed in detail. Risks, benefits, and alternatives discussed, as well as follow up needed after, the specifics of the procedure, post-procedure follow up and restrictions. I think she understands, and all her questions were answered. She will schedule with Dr. Oakes for LEEREYNOLD.    Due to language barrier, an  was present during the history-taking and subsequent discussion (and for part of the physical exam) with this patient.      Ora Hall MD  [facility]

## 2018-02-14 ENCOUNTER — OFFICE VISIT (OUTPATIENT)
Dept: OBGYN | Facility: CLINIC | Age: 63
End: 2018-02-14
Payer: COMMERCIAL

## 2018-02-14 VITALS
SYSTOLIC BLOOD PRESSURE: 138 MMHG | BODY MASS INDEX: 32.57 KG/M2 | DIASTOLIC BLOOD PRESSURE: 70 MMHG | HEART RATE: 84 BPM | WEIGHT: 173.8 LBS

## 2018-02-14 DIAGNOSIS — D06.9 CIN III WITH SEVERE DYSPLASIA: Primary | ICD-10-CM

## 2018-02-14 PROCEDURE — 57460 BX OF CERVIX W/SCOPE LEEP: CPT | Performed by: OBSTETRICS & GYNECOLOGY

## 2018-02-14 PROCEDURE — 88307 TISSUE EXAM BY PATHOLOGIST: CPT | Performed by: OBSTETRICS & GYNECOLOGY

## 2018-02-14 NOTE — PATIENT INSTRUCTIONS
Post-LEEP instructions:    1. Please avoid heavy lifting, tampons, douching, sexual intercourse and hot tubs for 4 weeks after the procedure.   You may return to other normal activity without restrictions. It is ok to take baths.    2. You may experience mild cramping after the procedure; also light bleeding and/or brownish vaginal discharge for about 2 weeks after the procedure.  Take ibuprofen 600mg every 6 hours as needed for cramping following procedure.    3. Contact the clinic if you experience heavy bleeding, fever, severe pelvic pain, temperature to 100.4 or higher, or any foul vaginal odor.

## 2018-02-14 NOTE — MR AVS SNAPSHOT
After Visit Summary   2/14/2018    Rajwinder Lama    MRN: [id number]           Patient Information     Date Of Birth          1955        Visit Information        Provider Department      2/14/2018 2:00 PM Sirisha Oakes MD; LANGUAGE BANC; RI PROC  2 [facility]        Care Instructions    Post-LEEP instructions:    1. Please avoid heavy lifting, tampons, douching, sexual intercourse and hot tubs for 4 weeks after the procedure.   You may return to other normal activity without restrictions. It is ok to take baths.    2. You may experience mild cramping after the procedure; also light bleeding and/or brownish vaginal discharge for about 2 weeks after the procedure.  Take ibuprofen 600mg every 6 hours as needed for cramping following procedure.    3. Contact the clinic if you experience heavy bleeding, fever, severe pelvic pain, temperature to 100.4 or higher, or any foul vaginal odor.           Follow-ups after your visit        Who to contact     If you have questions or need follow up information about today's clinic visit or your schedule please contact [facility] directly at [phone].  Normal or non-critical lab and imaging results will be communicated to you by [employer], letter or phone within 4 business days after the clinic has received the results. If you do not hear from us within 7 days, please contact the clinic through [employer] or phone. If you have a critical or abnormal lab result, we will notify you by phone as soon as possible.  Submit refill requests through [employer] or call your pharmacy and they will forward the refill request to us. Please allow 3 business days for your refill to be completed.          Additional Information About Your Visit        [employer] Information     [employer] gives you secure access to your electronic health record. If you see a primary care provider, you can also send messages to your care team and make appointments.  If you have questions, please call your primary care clinic.  If you do not have a primary care provider, please call [phone] and they will assist you.        Care EveryWhere ID     This is your Care EveryWhere ID. This could be used by other organizations to access your [place] medical records  [id number]        Your Vitals Were     Pulse BMI (Body Mass Index)                84 32.57 kg/m2           Blood Pressure from Last 3 Encounters:   02/14/18 138/70   01/23/18 140/84   12/15/17 146/84    Weight from Last 3 Encounters:   02/14/18 173 lb 12.8 oz (78.8 kg)   01/23/18 172 lb (78 kg)   12/15/17 172 lb (78 kg)              Today, you had the following     No orders found for display       Primary Care Provider Office Phone # Fax #    [facility] [phone] [address] [facility] [address]        Equal Access to Services     TRACEY PEÑA AH: Hadii estevan boyer hadasho Soomaali, waaxda luqadaha, qaybta kaalmada adeegyada, hanane payton . So [facility] [phone].    ATENCIÓN: Si habla español, tiene a jaramillo disposición servicios gratuitos de asistencia lingüística. Llame al [phone].    We comply with applicable federal civil rights laws and [place] laws. We do not discriminate on the basis of race, color, national origin, age, disability, sex, sexual orientation, or gender identity.            Thank you!     Thank you for choosing [facility]  for your care. Our goal is always to provide you with excellent care. Hearing back from our patients is one way we can continue to improve our services. Please take a few minutes to complete the written survey that you may receive in the mail after your visit with us. Thank you!             Your Updated Medication List - Protect others around you: Learn how to safely use, store and throw away your medicines at www.disposemymeds.org.          This list is accurate as of 2/14/18  2:21 PM.   Always use your most recent med list.                   Brand Name Dispense Instructions for use Diagnosis    MULTIVITAMIN PO

## 2018-02-14 NOTE — NURSING NOTE
"Chief Complaint   Patient presents with     Procedure     LEEP procedure for high grade cervical dsyplasia on colposcopy ( CIN2/CIN3)       Initial /70 (BP Location: Left arm, Patient Position: Chair, Cuff Size: Adult Regular)  Pulse 84  Wt 173 lb 12.8 oz (78.8 kg)  BMI 32.57 kg/m2 Estimated body mass index is 32.57 kg/(m^2) as calculated from the following:    Height as of 1/23/18: 5' 1.25\" (1.556 m).    Weight as of this encounter: 173 lb 12.8 oz (78.8 kg).  Medication Reconciliation: complete     Ricky Glover CMA      "

## 2018-02-14 NOTE — PROGRESS NOTES
INDICATIONS:                                                      Rajwinder Lama, is a 63 year old female, who had a recent CIN2/3 on colposcopic biopsy and ECC specimen, performed for HPV 16. No prior history of abnormal pap.  Here today for LEEP. Discussed indication, risks of infection and bleeding. Consent was obtained.    Lab Results   Component Value Date    PAP NIL, HPV 16+ 08/01/2017       PROCEDURE:                                                    /70 (BP Location: Left arm, Patient Position: Chair, Cuff Size: Adult Regular)  Pulse 84  Wt 173 lb 12.8 oz (78.8 kg)  BMI 32.57 kg/m2     The patient was placed was in the dorsal lithotomy position with feet in yellow fin stirrups. A coated speculum was placed in the vagina and the cervix visualized. Acetic acid was applied to the cervix and vagina and the cervix was viewed colposcopically. Intracervical block was performed with 10cc of 0.25% bupivacaine with epinephrine. A coated single toothed tenaculum was placed on the anterior lip of the cervix. A loop electrode was used on cut electrocautery mode and a cone of the cervix was removed around the os in 3 passes. A stitch was placed at 12 o'clock for orientation. Endocervical curettage was attempted but not successful, unable to penetrate the cervical canal. A ball electrode was used on coagulation electrocautery to achieve good hemostasis of the cervix. The speculum was removed.     POST PROCEDURE:                                                      IMPRESSION: Patient tolerated procedure well.    PLAN : Await the results of the cervical cone biopsy and ECC.  Repeat pap in 12 and 24 months.  She  tolerated the procedure well. There were no complications. Patient was discharged in stable condition.    Patient advised to call the clinic if excessive bleeding, pelvic pain, or fever.     Follow-up plan based on pathology results.    Sirisha Oakes MD

## 2018-02-16 LAB — COPATH REPORT: NORMAL

## 2018-02-19 NOTE — PROGRESS NOTES
Please call patient and notify her of her normal LEEP biopsy results. There is no evidence of cancer or precancerous changes. This is great news! She should return for a repeat pap smear in 1 year.     Thanks,  Sirisha Oakes MD

## 2018-04-02 ENCOUNTER — OFFICE VISIT (OUTPATIENT)
Dept: PEDIATRICS | Facility: CLINIC | Age: 63
End: 2018-04-02
Payer: COMMERCIAL

## 2018-04-02 VITALS
DIASTOLIC BLOOD PRESSURE: 68 MMHG | RESPIRATION RATE: 14 BRPM | HEIGHT: 61 IN | OXYGEN SATURATION: 98 % | SYSTOLIC BLOOD PRESSURE: 134 MMHG | WEIGHT: 168 LBS | BODY MASS INDEX: 31.72 KG/M2 | HEART RATE: 56 BPM

## 2018-04-02 DIAGNOSIS — J34.3 NASAL TURBINATE HYPERTROPHY: Primary | ICD-10-CM

## 2018-04-02 PROCEDURE — 99213 OFFICE O/P EST LOW 20 MIN: CPT | Performed by: INTERNAL MEDICINE

## 2018-04-02 RX ORDER — FLUTICASONE PROPIONATE 50 MCG
2 SPRAY, SUSPENSION (ML) NASAL 2 TIMES DAILY
Qty: 1 BOTTLE | Refills: 11 | Status: SHIPPED | OUTPATIENT
Start: 2018-04-02 | End: 2019-09-25

## 2018-04-02 RX ORDER — CETIRIZINE HYDROCHLORIDE 10 MG/1
10 TABLET ORAL EVERY EVENING
Qty: 30 TABLET | Refills: 1 | Status: SHIPPED | OUTPATIENT
Start: 2018-04-02 | End: 2018-06-23

## 2018-04-02 NOTE — PROGRESS NOTES
"  SUBJECTIVE:   Rajwinder Lama is a 63 year old female who presents to clinic today for the following health issues:    Sinus Problem      Duration: about 4-5 years    Description (location/character/radiation): sinus/nose    Intensity:  mild    Accompanying signs and symptoms: difficulty breathing out of the nose     History (similar episodes/previous evaluation): ongoing for several years    Precipitating or alleviating factors: None    Therapies tried and outcome: None       Rajwinder comes in for evaluation of R nose pain. Has been having pain in her nose for past 5 years, seems to be getting worse. Comes and goes, thinks this occurs about 2-3 days a week. Also notices a \"bad smell\" in her R side intermittently. Lasts for a couple of hours. Just on the right side. No nasal congestion or difficulty breathing from R nostril compared to L. Recently moved here about 10 months ago, did have a fair bit of bleeding from R nostril prior to her move, but none since then. No ear problems. Still has sense of smell. Has not tried any medications. Does have some headaches, but these are infrequent. No eye watering or irritation.    Problem list and histories reviewed & adjusted, as indicated.  Additional history: as documented    Patient Active Problem List   Diagnosis     Cervical high risk HPV (human papillomavirus) test positive     Focal lesion of iris     Past Surgical History:   Procedure Laterality Date     gall stone         Social History   Substance Use Topics     Smoking status: Never Smoker     Smokeless tobacco: Never Used     Alcohol use Yes      Comment: ethipian drink rarely     Family History   Problem Relation Age of Onset     Coronary Artery Disease Mother      Hypertension Mother      Hypertension Father            Reviewed and updated as needed this visit by clinical staff  Tobacco  Allergies  Meds  Med Hx  Surg Hx  Fam Hx  Soc Hx        ROS:  Constitutional, HEENT systems are negative, except as " "otherwise noted.    OBJECTIVE:     /68 (BP Location: Right arm, Patient Position: Chair, Cuff Size: Adult Regular)  Pulse 56  Resp 14  Ht 5' 1.25\" (1.556 m)  Wt 168 lb (76.2 kg)  SpO2 98%  BMI 31.48 kg/m2  Body mass index is 31.48 kg/(m^2).  GENERAL: healthy, alert and no distress  EYES: Eyes grossly normal to inspection, PERRL and conjunctivae and sclerae normal  HENT: normal cephalic/atraumatic, right ear: clear effusion, left ear: normal: no effusions, no erythema, normal landmarks, nasal mucosa edematous with enlarged inferior R turbinate, oropharynx clear and oral mucous membranes moist    Diagnostic Test Results:  none     ASSESSMENT/PLAN:     1. Nasal turbinate hypertrophy  Unclear if this is the cause of nasal discomfort, R middle ear effusion and atypical smell. Will have her try intranasal corticosteroid spray and antihistamine. If no improvement with this in 2-4 weeks, should see ENT for further evaluation, as she may need nasopharyngoscopy for better evaluation. unclear what bad smell may be related to, does not appear to have obvious infectious symptoms but certainly this can be better evaluated by direct visualization with ENT or consider CT sinuses.   - cetirizine (ZYRTEC) 10 MG tablet; Take 1 tablet (10 mg) by mouth every evening  Dispense: 30 tablet; Refill: 1  - fluticasone (FLONASE) 50 MCG/ACT spray; Spray 2 sprays into right nostril 2 times daily  Dispense: 1 Bottle; Refill: 11  - OTOLARYNGOLOGY REFERRAL    Patient Instructions   Most likely this is due to enlargement of the right nasal turbinate (nasal fold). We will have you start nasal spray Flonase 2 sprays twice daily and cetirizine 10mg once daily to treat for possible inflammation/allergies. If this does not improve in 2-4 weeks, see an Ear, Nose, Throat specialist.     Think about doing a test in the future to look for cancer of the large intestine.       Charo Boogie MD  [facility] [place]    "

## 2018-04-02 NOTE — MR AVS SNAPSHOT
After Visit Summary   4/2/2018    Rajwinder Lama    MRN: [id number]           Patient Information     Date Of Birth          1955        Visit Information        Provider Department      4/2/2018 10:00 AM Charo Saenz MD; [employer] [facility]        Today's Diagnoses     Nasal turbinate hypertrophy    -  1      Care Instructions    Most likely this is due to enlargement of the right nasal turbinate (nasal fold). We will have you start nasal spray Flonase 2 sprays twice daily and cetirizine 10mg once daily to treat for possible inflammation/allergies. If this does not improve in 2-4 weeks, see an Ear, Nose, Throat specialist.     Think about doing a test in the future to look for cancer of the large intestine.           Follow-ups after your visit        Additional Services     OTOLARYNGOLOGY REFERRAL       Your provider has referred you to: N: [facility] - [place] [phone]   [email or web address]  FHN: [facility] - [place] [phone]   [email or web address]    Please be aware that coverage of these services is subject to the terms and limitations of your health insurance plan.  Call member services at your health plan with any benefit or coverage questions.      Please bring the following with you to your appointment:    (1) Any X-Rays, CTs or MRIs which have been performed.  Contact the facility where they were done to arrange for  prior to your scheduled appointment.   (2) List of current medications  (3) This referral request   (4) Any documents/labs given to you for this referral                  Your next 10 appointments already scheduled     Apr 03, 2018  8:45 AM CDT   MyChart OB-GYN Return with Sirisha Oakes MD   [facility]    [address]  [facility] [address] [phone]              Who to contact     If you have  "questions or need follow up information about today's clinic visit or your schedule please contact [facility] [place] directly at [phone].  Normal or non-critical lab and imaging results will be communicated to you by MyChart, letter or phone within 4 business days after the clinic has received the results. If you do not hear from us within 7 days, please contact the clinic through [employer] ?hart or phone. If you have a critical or abnormal lab result, we will notify you by phone as soon as possible.  Submit refill requests through [employer] or call your pharmacy and they will forward the refill request to us. Please allow 3 business days for your refill to be completed.          Additional Information About Your Visit        [employer] ?harCaregivers Information     [employer] gives you secure access to your electronic health record. If you see a primary care provider, you can also send messages to your care team and make appointments. If you have questions, please call your primary care clinic.  If you do not have a primary care provider, please call [phone] and they will assist you.        Care EveryWhere ID     This is your Care EveryWhere ID. This could be used by other organizations to access your [place] medical records  [id number]        Your Vitals Were     Pulse Respirations Height Pulse Oximetry BMI (Body Mass Index)       56 14 5' 1.25\" (1.556 m) 98% 31.48 kg/m2        Blood Pressure from Last 3 Encounters:   04/02/18 134/68   02/14/18 138/70   01/23/18 140/84    Weight from Last 3 Encounters:   04/02/18 168 lb (76.2 kg)   02/14/18 173 lb 12.8 oz (78.8 kg)   01/23/18 172 lb (78 kg)              We Performed the Following     OTOLARYNGOLOGY REFERRAL          Today's Medication Changes          These changes are accurate as of 4/2/18 10:42 AM.  If you have any questions, ask your nurse or doctor.               Start taking these medicines.        Dose/Directions    cetirizine 10 MG tablet   Commonly known as:  zyrTEC "   Used for:  Nasal turbinate hypertrophy   Started by:  Charo Saenz MD        Dose:  10 mg   Take 1 tablet (10 mg) by mouth every evening   Quantity:  30 tablet   Refills:  1       fluticasone 50 MCG/ACT spray   Commonly known as:  FLONASE   Used for:  Nasal turbinate hypertrophy   Started by:  Charo Saenz MD        Dose:  2 spray   Spray 2 sprays into right nostril 2 times daily   Quantity:  1 Bottle   Refills:  11            Where to get your medicines      These medications were sent to [facility]/pharmacy #[id number] - [place], [place] - [address] AT [address], [place] [address]     Phone:  [phone]     cetirizine 10 MG tablet    fluticasone 50 MCG/ACT spray                Primary Care Provider Office Phone # Fax #    [facility] [phone] [address] [facility]  [place] [address]        Equal Access to Services     Sharp Mesa VistaJEFF AH: Hadii estevan swansono Sobandar, waaxda luqadaha, qaybta kaalmada adeegyada, hanane payton . So [facility] [phone].    ATENCIÓN: Si habla español, tiene a jaramillo disposición servicios gratuitos de asistencia lingüística. Llame al [phone].    We comply with applicable federal civil rights laws and [place] laws. We do not discriminate on the basis of race, color, national origin, age, disability, sex, sexual orientation, or gender identity.            Thank you!     Thank you for choosing [facility]  for your care. Our goal is always to provide you with excellent care. Hearing back from our patients is one way we can continue to improve our services. Please take a few minutes to complete the written survey that you may receive in the mail after your visit with us. Thank you!             Your Updated Medication List - Protect others around you: Learn how to safely use, store and throw away your medicines at www.disposemymeds.org.          This list is accurate as of  4/2/18 10:42 AM.  Always use your most recent med list.                   Brand Name Dispense Instructions for use Diagnosis    cetirizine 10 MG tablet    zyrTEC    30 tablet    Take 1 tablet (10 mg) by mouth every evening    Nasal turbinate hypertrophy       fluticasone 50 MCG/ACT spray    FLONASE    1 Bottle    Spray 2 sprays into right nostril 2 times daily    Nasal turbinate hypertrophy       MULTIVITAMIN PO

## 2018-04-02 NOTE — PATIENT INSTRUCTIONS
Most likely this is due to enlargement of the right nasal turbinate (nasal fold). We will have you start nasal spray Flonase 2 sprays twice daily and cetirizine 10mg once daily to treat for possible inflammation/allergies. If this does not improve in 2-4 weeks, see an Ear, Nose, Throat specialist.     Think about doing a test in the future to look for cancer of the large intestine.

## 2018-04-03 ENCOUNTER — OFFICE VISIT (OUTPATIENT)
Dept: OBGYN | Facility: CLINIC | Age: 63
End: 2018-04-03
Payer: COMMERCIAL

## 2018-04-03 VITALS
HEART RATE: 60 BPM | WEIGHT: 169.6 LBS | BODY MASS INDEX: 31.78 KG/M2 | DIASTOLIC BLOOD PRESSURE: 78 MMHG | SYSTOLIC BLOOD PRESSURE: 124 MMHG

## 2018-04-03 DIAGNOSIS — R87.810 CERVICAL HIGH RISK HPV (HUMAN PAPILLOMAVIRUS) TEST POSITIVE: ICD-10-CM

## 2018-04-03 DIAGNOSIS — N87.1 CIN II (CERVICAL INTRAEPITHELIAL NEOPLASIA II): Primary | ICD-10-CM

## 2018-04-03 PROCEDURE — 99213 OFFICE O/P EST LOW 20 MIN: CPT | Performed by: OBSTETRICS & GYNECOLOGY

## 2018-04-03 NOTE — MR AVS SNAPSHOT
After Visit Summary   4/3/2018    Rajwinder Lama    MRN: [id number]           Patient Information     Date Of Birth          1955        Visit Information        Provider Department      4/3/2018 8:45 AM Sirisha Oakes MD; [facility]        Today's Diagnoses     ZO II (cervical intraepithelial neoplasia II)    -  1    Cervical high risk HPV (human papillomavirus) test positive           Follow-ups after your visit        Who to contact     If you have questions or need follow up information about today's clinic visit or your schedule please contact [facility] directly at [phone].  Normal or non-critical lab and imaging results will be communicated to you by MyChart, letter or phone within 4 business days after the clinic has received the results. If you do not hear from us within 7 days, please contact the clinic through [employer] or phone. If you have a critical or abnormal lab result, we will notify you by phone as soon as possible.  Submit refill requests through [employer] or call your pharmacy and they will forward the refill request to us. Please allow 3 business days for your refill to be completed.          Additional Information About Your Visit        MyChart Information     [employer] gives you secure access to your electronic health record. If you see a primary care provider, you can also send messages to your care team and make appointments. If you have questions, please call your primary care clinic.  If you do not have a primary care provider, please call [phone] and they will assist you.        Care EveryWhere ID     This is your Care EveryWhere ID. This could be used by other organizations to access your [facility] medical records  [id number]        Your Vitals Were     Pulse BMI (Body Mass Index)                60 31.78 kg/m2           Blood Pressure from Last 3 Encounters:   04/03/18 124/78   04/02/18 134/68   02/14/18  138/70    Weight from Last 3 Encounters:   04/03/18 169 lb 9.6 oz (76.9 kg)   04/02/18 168 lb (76.2 kg)   02/14/18 173 lb 12.8 oz (78.8 kg)              Today, you had the following     No orders found for display       Primary Care Provider Office Phone # Fax #    [facility] [phone] [address] [facility] [address]        Equal Access to Services     TRACEY PEÑA : Hadii aad ku hadasho Soomaali, waaxda luqadaha, qaybta kaalmada adeegyada, waxay idiin hayaan adeeg kharash la'enrique . So [facility] [phone].    ATENCIÓN: Si habla español, tiene a jaramillo disposición servicios gratuitos de asistencia lingüística. [facility] [phone].    We comply with applicable federal civil rights laws and [place] laws. We do not discriminate on the basis of race, color, national origin, age, disability, sex, sexual orientation, or gender identity.            Thank you!     Thank you for choosing [facility]  for your care. Our goal is always to provide you with excellent care. Hearing back from our patients is one way we can continue to improve our services. Please take a few minutes to complete the written survey that you may receive in the mail after your visit with us. Thank you!             Your Updated Medication List - Protect others around you: Learn how to safely use, store and throw away your medicines at www.disposemymeds.org.          This list is accurate as of 4/3/18  9:20 AM.  Always use your most recent med list.                   Brand Name Dispense Instructions for use Diagnosis    cetirizine 10 MG tablet    zyrTEC    30 tablet    Take 1 tablet (10 mg) by mouth every evening    Nasal turbinate hypertrophy       fluticasone 50 MCG/ACT spray    FLONASE    1 Bottle    Spray 2 sprays into right nostril 2 times daily    Nasal turbinate hypertrophy       MULTIVITAMIN PO

## 2018-04-03 NOTE — PROGRESS NOTES
"Gyn Clinic Visit Note  4/3/2018    S: Rajwinder Lama is a 63 year old  here for follow up after LEEP procedure in clinic on . She had 1 week of bleeding followed by scant spotting which decreased daily for 2 weeks. No pain, no fevers/chills.    Lab Results   Component Value Date    PAP NIL, +HPV16 2017   [place] 12/15: ECC with ZO 2-3    O:   /78 (BP Location: Left arm, Patient Position: Chair, Cuff Size: Adult Regular)  Pulse 60  Wt 169 lb 9.6 oz (76.9 kg)  BMI 31.78 kg/m2  Gen: sitting in chair in no acute distress, comfortable  Eyes: no scleral icterus, EOMI  CV: regular rate, well perfused  Pulm: no increased work of breathing, no cough  Abd: soft, non-tender to palpation, no organomegaly, no palpable masses  Skin: warm and dry, no rashes/lesions  Psych: mood stable, appropriate affect  Neuro: A+Ox3   : External genitalia atrophic with normal architecture and hair distribution.  No obvious excoriations, lesions, or rashes. Bartholins, urethra, normal.  Normal moist pink vaginal mucosa.  SSE: No blood in the vault, cervix with post-LEEP appearance healing well, normal physiologic discharge.      Pathology:   Copath Report   Date Value Ref Range Status   2018   Final    SPECIMEN(S):  LEEP biopsy    FINAL DIAGNOSIS:  Cervix, LEEP biopsy-  - Negative for residual squamous intraepithelial lesion.  - Negative for glandular dysplasia and malignancy.  - Reactive changes, consistent with prior biopsy site.  - Sampling includes: Ectocervix, transformation zone, and endocervix.    CLINICAL HISTORY:  High-grade cervical dysplasia.    GROSS:  The specimen is received in formalin labeled with the patient's name,   identifying information and \"suture at  12:00 (requisition states LEEP biopsy)\".  It consists of a 2.5 x 2 x 1.8   cm oriented cervical LEEP with a  suture placed at 12:00.  There endocervix is inked blue, and the remainder   of the specimen inked black.  The  specimen is partially " incised near the 12:00 endocervical area.  Entirely   submitted.       A: 63 year old female here for follow up of office LEEP on 2/14/18. Doing well. Reviewed pathology negative for any dysplasia.    P:   Repeat co-testing in 1 year.     Sirisha Oakes MD   OB/Gyn  4/3/2018

## 2018-04-12 ENCOUNTER — APPOINTMENT (OUTPATIENT)
Dept: GENERAL RADIOLOGY | Facility: CLINIC | Age: 63
End: 2018-04-12
Attending: EMERGENCY MEDICINE
Payer: COMMERCIAL

## 2018-04-12 ENCOUNTER — APPOINTMENT (OUTPATIENT)
Dept: CT IMAGING | Facility: CLINIC | Age: 63
End: 2018-04-12
Attending: EMERGENCY MEDICINE
Payer: COMMERCIAL

## 2018-04-12 ENCOUNTER — HOSPITAL ENCOUNTER (EMERGENCY)
Facility: CLINIC | Age: 63
Discharge: HOME | End: 2018-04-13
Attending: EMERGENCY MEDICINE | Admitting: EMERGENCY MEDICINE
Payer: COMMERCIAL

## 2018-04-12 DIAGNOSIS — R10.13 ABDOMINAL PAIN, EPIGASTRIC: ICD-10-CM

## 2018-04-12 DIAGNOSIS — I10 HYPERTENSION, UNSPECIFIED TYPE: ICD-10-CM

## 2018-04-12 LAB
ANION GAP SERPL CALCULATED.3IONS-SCNC: 9 MMOL/L (ref 3–14)
BASOPHILS # BLD AUTO: 0 10E9/L (ref 0–0.2)
BASOPHILS NFR BLD AUTO: 0.1 %
BUN SERPL-MCNC: 16 MG/DL (ref 7–30)
CALCIUM SERPL-MCNC: 8.8 MG/DL (ref 8.5–10.1)
CHLORIDE SERPL-SCNC: 105 MMOL/L (ref 94–109)
CO2 SERPL-SCNC: 28 MMOL/L (ref 20–32)
CREAT SERPL-MCNC: 0.78 MG/DL (ref 0.52–1.04)
DIFFERENTIAL METHOD BLD: ABNORMAL
EOSINOPHIL # BLD AUTO: 0.2 10E9/L (ref 0–0.7)
EOSINOPHIL NFR BLD AUTO: 2.1 %
ERYTHROCYTE [DISTWIDTH] IN BLOOD BY AUTOMATED COUNT: 12.5 % (ref 10–15)
ETHANOL SERPL-MCNC: <0.01 G/DL
GFR SERPL CREATININE-BSD FRML MDRD: 74 ML/MIN/1.7M2
GLUCOSE SERPL-MCNC: 100 MG/DL (ref 70–99)
HCT VFR BLD AUTO: 35.9 % (ref 35–47)
HGB BLD-MCNC: 12.5 G/DL (ref 11.7–15.7)
IMM GRANULOCYTES # BLD: 0 10E9/L (ref 0–0.4)
IMM GRANULOCYTES NFR BLD: 0.4 %
LYMPHOCYTES # BLD AUTO: 2.7 10E9/L (ref 0.8–5.3)
LYMPHOCYTES NFR BLD AUTO: 37.6 %
MCH RBC QN AUTO: 30.1 PG (ref 26.5–33)
MCHC RBC AUTO-ENTMCNC: 34.8 G/DL (ref 31.5–36.5)
MCV RBC AUTO: 87 FL (ref 78–100)
MONOCYTES # BLD AUTO: 0.5 10E9/L (ref 0–1.3)
MONOCYTES NFR BLD AUTO: 7.6 %
NEUTROPHILS # BLD AUTO: 3.7 10E9/L (ref 1.6–8.3)
NEUTROPHILS NFR BLD AUTO: 52.2 %
NRBC # BLD AUTO: 0 10*3/UL
NRBC BLD AUTO-RTO: 0 /100
PLATELET # BLD AUTO: 146 10E9/L (ref 150–450)
POTASSIUM SERPL-SCNC: 3.6 MMOL/L (ref 3.4–5.3)
RBC # BLD AUTO: 4.15 10E12/L (ref 3.8–5.2)
SODIUM SERPL-SCNC: 142 MMOL/L (ref 133–144)
TROPONIN I SERPL-MCNC: <0.015 UG/L (ref 0–0.04)
WBC # BLD AUTO: 7.1 10E9/L (ref 4–11)

## 2018-04-12 PROCEDURE — 80320 DRUG SCREEN QUANTALCOHOLS: CPT | Performed by: EMERGENCY MEDICINE

## 2018-04-12 PROCEDURE — 70450 CT HEAD/BRAIN W/O DYE: CPT

## 2018-04-12 PROCEDURE — 25000125 ZZHC RX 250: Performed by: EMERGENCY MEDICINE

## 2018-04-12 PROCEDURE — 80048 BASIC METABOLIC PNL TOTAL CA: CPT | Performed by: EMERGENCY MEDICINE

## 2018-04-12 PROCEDURE — 25000128 H RX IP 250 OP 636: Performed by: EMERGENCY MEDICINE

## 2018-04-12 PROCEDURE — 85025 COMPLETE CBC W/AUTO DIFF WBC: CPT | Performed by: EMERGENCY MEDICINE

## 2018-04-12 PROCEDURE — 84484 ASSAY OF TROPONIN QUANT: CPT | Performed by: EMERGENCY MEDICINE

## 2018-04-12 PROCEDURE — 74177 CT ABD & PELVIS W/CONTRAST: CPT

## 2018-04-12 PROCEDURE — 71046 X-RAY EXAM CHEST 2 VIEWS: CPT

## 2018-04-12 PROCEDURE — 99285 EMERGENCY DEPT VISIT HI MDM: CPT | Mod: 25

## 2018-04-12 PROCEDURE — 93005 ELECTROCARDIOGRAM TRACING: CPT

## 2018-04-12 RX ORDER — IOPAMIDOL 755 MG/ML
85 INJECTION, SOLUTION INTRAVASCULAR ONCE
Status: COMPLETED | OUTPATIENT
Start: 2018-04-12 | End: 2018-04-12

## 2018-04-12 RX ADMIN — SODIUM CHLORIDE 66 ML: 9 INJECTION, SOLUTION INTRAVENOUS at 23:11

## 2018-04-12 RX ADMIN — IOPAMIDOL 85 ML: 755 INJECTION, SOLUTION INTRAVENOUS at 23:11

## 2018-04-12 ASSESSMENT — ENCOUNTER SYMPTOMS
MYALGIAS: 0
COUGH: 0
VOMITING: 0
HEADACHES: 0
ARTHRALGIAS: 0
BACK PAIN: 0
NUMBNESS: 0
FEVER: 0

## 2018-04-12 NOTE — ED AVS SNAPSHOT
Emergency Department    [address] [facility] [address]    Phone:  [phone]    Fax:  [phone]                                       Rajwinder Lama   MRN: [id number]    Department:   Emergency Department   Date of Visit:  4/12/2018           Patient Information     Date Of Birth          1955        Your diagnoses for this visit were:     Abdominal pain, epigastric     Hypertension, unspecified type        You were seen by Gricel Healy MD.      Follow-up Information     Follow up with [facility].    Why:  Have your blood pressure rechecked at your doctors office. Eat smaller meals    Contact information:    [address] [facility]  [place] [id number] [phone]        Discharge References/Attachments     HYPERTENSION, TO BE CONFIRMED (ENGLISH)    ABDOMINAL PAIN, ADULT (ENGLISH)      24 Hour Appointment Hotline       To make an appointment at any [facility], call [phone] ([phone]). If you don't have a family doctor or clinic, we will help you find one. [place] clinics are conveniently located to serve the needs of you and your family.             Review of your medicines      Our records show that you are taking the medicines listed below. If these are incorrect, please call your family doctor or clinic.        Dose / Directions Last dose taken    cetirizine 10 MG tablet   Commonly known as:  zyrTEC   Dose:  10 mg   Quantity:  30 tablet        Take 1 tablet (10 mg) by mouth every evening   Refills:  1        fluticasone 50 MCG/ACT spray   Commonly known as:  FLONASE   Dose:  2 spray   Quantity:  1 Bottle        Spray 2 sprays into right nostril 2 times daily   Refills:  11        MULTIVITAMIN PO        Refills:  0                Procedures and tests performed during your visit     Alcohol level blood    Basic metabolic panel    CBC with platelets differential    CT Abdomen Pelvis w Contrast    Cardiac Continuous Monitoring    EKG 12  lead    EKG 12-lead, tracing only    Head CT w/o contrast    Pulse oximetry nursing    Troponin I    Vital signs    XR Chest 2 Views      Orders Needing Specimen Collection     None      Pending Results     Date and Time Order Name Status Description    4/12/2018 2240 CT Abdomen Pelvis w Contrast Preliminary     4/12/2018 2216 XR Chest 2 Views Preliminary             Pending Culture Results     No orders found for last 3 day(s).            Pending Results Instructions     If you had any lab results that were not finalized at the time of your Discharge, you can call the ED Lab Result RN at [phone]. You will be contacted by this team for any positive Lab results or changes in treatment. The nurses are available 7 days a week from 10A to 6:30P.  You can leave a message 24 hours per day and they will return your call.        Test Results From Your Hospital Stay        4/12/2018 10:54 PM      Component Results     Component Value Ref Range & Units Status    WBC 7.1 4.0 - 11.0 10e9/L Final    RBC Count 4.15 3.8 - 5.2 10e12/L Final    Hemoglobin 12.5 11.7 - 15.7 g/dL Final    Hematocrit 35.9 35.0 - 47.0 % Final    MCV 87 78 - 100 fl Final    MCH 30.1 26.5 - 33.0 pg Final    MCHC 34.8 31.5 - 36.5 g/dL Final    RDW 12.5 10.0 - 15.0 % Final    Platelet Count 146 (L) 150 - 450 10e9/L Final    Diff Method Automated Method  Final    % Neutrophils 52.2 % Final    % Lymphocytes 37.6 % Final    % Monocytes 7.6 % Final    % Eosinophils 2.1 % Final    % Basophils 0.1 % Final    % Immature Granulocytes 0.4 % Final    Nucleated RBCs 0 0 /100 Final    Absolute Neutrophil 3.7 1.6 - 8.3 10e9/L Final    Absolute Lymphocytes 2.7 0.8 - 5.3 10e9/L Final    Absolute Monocytes 0.5 0.0 - 1.3 10e9/L Final    Absolute Eosinophils 0.2 0.0 - 0.7 10e9/L Final    Absolute Basophils 0.0 0.0 - 0.2 10e9/L Final    Abs Immature Granulocytes 0.0 0 - 0.4 10e9/L Final    Absolute Nucleated RBC 0.0  Final         4/12/2018 11:08 PM      Component Results      Component Value Ref Range & Units Status    Sodium 142 133 - 144 mmol/L Final    Potassium 3.6 3.4 - 5.3 mmol/L Final    Chloride 105 94 - 109 mmol/L Final    Carbon Dioxide 28 20 - 32 mmol/L Final    Anion Gap 9 3 - 14 mmol/L Final    Glucose 100 (H) 70 - 99 mg/dL Final    Urea Nitrogen 16 7 - 30 mg/dL Final    Creatinine 0.78 0.52 - 1.04 mg/dL Final    GFR Estimate 74 >60 mL/min/1.7m2 Final    Non  GFR Calc    GFR Estimate If Black 90 >60 mL/min/1.7m2 Final    African American GFR Calc    Calcium 8.8 8.5 - 10.1 mg/dL Final         4/12/2018 11:11 PM      Component Results     Component Value Ref Range & Units Status    Troponin I ES <0.015 0.000 - 0.045 ug/L Final    The 99th percentile for upper reference range is 0.045 ug/L.  Troponin values   in the range of 0.045 - 0.120 ug/L may be associated with risks of adverse   clinical events.           4/12/2018 11:34 PM      Narrative     XR CHEST 2 VW  4/12/2018 11:00 PM     HISTORY: Syncope.    COMPARISON: None.    FINDINGS: The heart size is normal. The lungs are clear. No  pneumothorax or pleural effusion.        Impression     IMPRESSION: No acute abnormality.         4/12/2018 11:08 PM      Component Results     Component Value Ref Range & Units Status    Ethanol g/dL <0.01 <0.01 g/dL Final         4/12/2018 11:38 PM      Narrative     CT ABDOMEN PELVIS W CONTRAST  4/12/2018 11:15 PM     HISTORY: Nausea and abdominal pain.     TECHNIQUE: CT abdomen and pelvis with 85 mL Isovue-370 IV. Radiation  dose for this scan was reduced using automated exposure control,  adjustment of the mA and/or kV according to patient size, or iterative  reconstruction technique.    COMPARISON: None.    FINDINGS:  Abdomen: The lung bases are unremarkable. The liver, spleen, pancreas,  adrenal glands and kidneys are normal in appearance. The gallbladder  is absent. There is a retroaortic left renal vein. No abdominal or  pelvic lymph node enlargement.    Pelvis:  There is no bowel obstruction or inflammation. The appendix is  normal. The stomach is very distended with gas and food debris. No  evidence of outlet obstruction. The uterus and adnexa appear normal.  No free intraperitoneal gas or fluid. A few pelvic phleboliths.  Degenerative disease in the spine.        Impression     IMPRESSION:  1. No bowel obstruction or inflammation.  2. The stomach is very distended with gas and food debris. No evidence  of outlet obstruction.         4/12/2018 11:17 PM      Narrative     CT SCAN OF THE HEAD WITHOUT CONTRAST   4/12/2018 11:13 PM     HISTORY: Syncope.    TECHNIQUE:  Axial images of the head and coronal reformations without  IV contrast material. Radiation dose for this scan was reduced using  automated exposure control, adjustment of the mA and/or kV according  to patient size, or iterative reconstruction technique.    COMPARISON: None.    FINDINGS:  There is generalized atrophy of the brain.  There is low  attenuation in the white matter of the cerebral hemispheres consistent  with sequelae of small vessel ischemic disease. There is no evidence  of intracranial hemorrhage, mass, acute infarct or anomaly.     The visualized portions of the sinuses and mastoids appear normal.  There is no evidence of trauma.        Impression     IMPRESSION: No acute abnormality.      JOSELIN HELLER MD                Clinical Quality Measure: Blood Pressure Screening     Your blood pressure was checked while you were in the emergency department today. The last reading we obtained was  BP: 162/82 . Please read the guidelines below about what these numbers mean and what you should do about them.  If your systolic blood pressure (the top number) is less than 120 and your diastolic blood pressure (the bottom number) is less than 80, then your blood pressure is normal. There is nothing more that you need to do about it.  If your systolic blood pressure (the top number) is 120-139 or your  diastolic blood pressure (the bottom number) is 80-89, your blood pressure may be higher than it should be. You should have your blood pressure rechecked within a year by a primary care provider.  If your systolic blood pressure (the top number) is 140 or greater or your diastolic blood pressure (the bottom number) is 90 or greater, you may have high blood pressure. High blood pressure is treatable, but if left untreated over time it can put you at risk for heart attack, stroke, or kidney failure. You should have your blood pressure rechecked by a primary care provider within the next 4 weeks.  If your provider in the emergency department today gave you specific instructions to follow-up with your doctor or provider even sooner than that, you should follow that instruction and not wait for up to 4 weeks for your follow-up visit.        Thank you for choosing [facility]       Thank you for choosing [facility] for your care. Our goal is always to provide you with excellent care. Hearing back from our patients is one way we can continue to improve our services. Please take a few minutes to complete the written survey that you may receive in the mail after you visit with us. Thank you!        [employer] Information     [employer] gives you secure access to your electronic health record. If you see a primary care provider, you can also send messages to your care team and make appointments. If you have questions, please call your primary care clinic.  If you do not have a primary care provider, please call [phone] and they will assist you.        Care EveryWhere ID     This is your Care EveryWhere ID. This could be used by other organizations to access your [facility] medical records  [id number]        Equal Access to Services     TRACEY PEÑA : Hadii estevan swansono Sobandar, waaxda luqadaha, qaybta kaalmada bridger, hanane payton . So [facility] [phone].    ATENCIÓN: katie Ramírez  disposición servicios gratuitos de asistencia lingüística. Joshua al [phone].    We comply with applicable federal civil rights laws and [place] laws. We do not discriminate on the basis of race, color, national origin, age, disability, sex, sexual orientation, or gender identity.            After Visit Summary       This is your record. Keep this with you and show to your community pharmacist(s) and doctor(s) at your next visit.

## 2018-04-12 NOTE — ED AVS SNAPSHOT
Emergency Department    [address] [place] [address]    Phone:  [phone]    Fax:  [phone]                                       Rajwinder Lama   MRN: [id number]    Department:   Emergency Department   Date of Visit:  4/12/2018           After Visit Summary Signature Page     I have received my discharge instructions, and my questions have been answered. I have discussed any challenges I see with this plan with the nurse or doctor.    ..........................................................................................................................................  Patient/Patient Representative Signature      ..........................................................................................................................................  Patient Representative Print Name and Relationship to Patient    ..................................................               ................................................  Date                                            Time    ..........................................................................................................................................  Reviewed by Signature/Title    ...................................................              ..............................................  Date                                                            Time

## 2018-04-13 VITALS
TEMPERATURE: 98.7 F | OXYGEN SATURATION: 98 % | RESPIRATION RATE: 20 BRPM | HEART RATE: 71 BPM | SYSTOLIC BLOOD PRESSURE: 162 MMHG | DIASTOLIC BLOOD PRESSURE: 82 MMHG

## 2018-04-13 LAB — INTERPRETATION ECG - MUSE: NORMAL

## 2018-04-13 NOTE — ED PROVIDER NOTES
"  History     Chief Complaint:  Abdominal pain    A phone   was used obtain history as well as to explain diagnosis, plan of treatment, reasons to return to the emergency department and appropriate follow up.  DAVID Lama is a 63 year old female who presents due to abdominal pain. The patient reports that when she got home this evening she began feeling nauseated. Shortly after this she experienced an episode of epigastric abdominal pain, however this lasted only a short while and alleviated after she burped. EMS report that was a comment made by family about a possible syncopal episode, however upon arrival here the patient denies this or any falls tonight. The patient states that she feels well upon arrival with no chest pain or other symptoms. She specifically denies any fever, cough, headache, vomiting, diarrhea, abdominal pain, leg pain, back pain, numbness, tingling, or vision problems. She does however note that she had half a glass of wine this evening.     After the patient's workup was complete her family members presented. They state she has been on a very strict diet for 55 days, broke her \"fast\" yesterday. Tonight she ate a significant amount of food and was sitting down on the couch when she had abdominal discomfort because she was so full. Her daughter thinks she did actually lose consciousness, but her eyes were open, and as soon as she burped she felt better. However in the process, because she was so full, she ate a little bit of orange to see if it would make her feel better but it just made her more full.     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  Smoking Status: No  Smokeless Tobacco: No  Alcohol Use: Yes  Marital Status:  "       Review of Systems   Constitutional: Negative for fever.   Eyes: Negative for visual disturbance.   Respiratory: Negative for cough.    Cardiovascular: Negative for chest pain.   Gastrointestinal: Negative for vomiting.   Musculoskeletal: Negative for arthralgias, back pain and myalgias.   Neurological: Positive for syncope. Negative for numbness and headaches.   All other systems reviewed and are negative.    Physical Exam   Vitals:  Patient Vitals for the past 24 hrs:   BP Temp Temp src Pulse Heart Rate Resp SpO2   04/13/18 0030 - - - - - 20 98 %   04/13/18 0000 162/82 - - - 58 - 98 %   04/12/18 2347 (!) 164/94 - - - - - -   04/12/18 2330 163/84 - - - 60 - 97 %   04/12/18 2320 182/86 - - - 70 - 99 %   04/12/18 2246 180/75 - - - 62 - 99 %   04/12/18 2243 195/86 - - - - - 97 %   04/12/18 2221 181/85 98.7  F (37.1  C) Oral 71 - 18 98 %     Physical Exam  Constitutional:  Patient is oriented to person, place, and time. They appear well-developed and well-nourished. No apparent distress.   HENT:   Mouth/Throat:   Oropharynx is clear and moist.   Eyes:    Conjunctivae normal and EOM are normal. Pupils are equal, round, and reactive to light.   Neck:    Normal range of motion.   Cardiovascular: Normal rate, regular rhythm and normal heart sounds.  Exam reveals no gallop and no friction rub.  No murmur heard.  Pulmonary/Chest:  Effort normal and breath sounds normal. Patient has no wheezes. Patient has no rales.   Abdominal:   Soft. Bowel sounds are normal. Patient exhibits no mass. There is no tenderness. There is no rebound and no guarding.   Musculoskeletal:  Normal range of motion. Patient exhibits no edema.   Neurological:   Patient is alert and oriented to person, place, and time. Patient has normal strength. No cranial nerve deficit or sensory deficit. GCS 15  Skin:   Skin is warm and dry. No rash noted. No erythema.   Psychiatric:   Patient has a normal mood and affect. Patient's behavior is normal.  Judgment and thought content normal.     Emergency Department Course     ECG:  ECG taken at 2229, ECG read at 2230  Sinus rhythm with premature supraventricular complexes  Otherwise normal ECG  Rate 68 bpm. PA interval 144. QRS duration 68. QT/QTc 404/429. P-R-T axes 54 12 63.    Imaging:  Radiology findings were communicated with the patient who voiced understanding of the findings.  Head CT w/o contrast:  No acute abnormality.    Per Radiologist.     CT Abdomen Pelvis w contrast:  1. No bowel obstruction or inflammation.  2. The stomach is very distended with gas and food debris. No evidence  of outlet obstruction.  Per Radiologist.     XR Chest 2 views:  No acute abnormality.  Per Radiologist.     Laboratory:  Laboratory findings were communicated with the patient who voiced understanding of the findings.  Alcohol level blood (collected at 2234): <0.01  CBC: PLT: 146 (L) o/w WNL. (WBC 7.1, HGB 12.5)   BMP: Glucose: 100 (H) o/w WNL (Creatinine 0.78)  Troponin (Collected 2234): <0.015    Emergency Department Course:  Nursing notes and vitals reviewed.  2230 I had my initial encounter with the patient.  I performed an exam of the patient as documented above.   IV was inserted and blood was drawn for laboratory testing, results above.  The patient was sent for a XR and CT while in the emergency department, results above.     0015 I discussed the treatment plan with the patient. They expressed understanding of this plan and consented to discharge. They will be discharged home with instructions for care and follow up. In addition, the patient will return to the emergency department if their symptoms persist, worsen, if new symptoms arise or if there is any concern.  All questions were answered.    Impression & Plan      Medical Decision Making:  Rajwinder Lama is a 63 year old female who presents to the emergency department today with a possible loss of consciousness. She denies this but family reports it. She has  been in a normal state of health. She really has no medical problems, but ate so much tonight she became very uncomfortable. On her evaluation here she was neurologically intact, had no reproducible pain on palpation, was significantly hypertensive, and she has no history of this. She was not tachycardic or hypoxic. diagnostic evaluation was obtained based on what the patient told me prior to family coming here. She has no metabolic derangement. Her white blood cell count and hemoglobin are normal. Her cardiac enzyme was within normal limits. I did check her blood alcohol level as she stated she drank a little bit of wine tonight, but this was undetectable. CT of her head was performed due to possible LOC, but this is negative. Abdominal CT does show a very distended stomach, but no evidence of outlet obstruction. This is likely the etiology of her discomfort. Again, on her examination here she has no focal abdominal pain. There is no free air either.     I suspect that she ate so much food and this made her so uncomfortable. Recheck and serial blood pressure checks show that her blood pressures coming down. I don't believe this is hypertensive emergency. I think this was elevated because she was uncomfortable. Throughout observation here this has come down appropriately, so I don't see anything that requires hospitalization. I will have her follow up with primary care doctor, both for blood pressure recheck and to make sure her abdominal pian does not recur.     Diagnosis:    ICD-10-CM    1. Abdominal pain, epigastric R10.13    2. Hypertension, unspecified type I10      Disposition:   Discharge    Scribe Disclosure:  I, Helder Erazo, am serving as a scribe at 10:21 PM on 4/12/2018 to document services personally performed by Gricel Healy MD, based on my observations and the provider's statements to me.    4/12/2018    EMERGENCY DEPARTMENT       Gricel Healy MD  04/15/18 3217

## 2018-06-23 DIAGNOSIS — J34.3 NASAL TURBINATE HYPERTROPHY: ICD-10-CM

## 2018-06-25 NOTE — TELEPHONE ENCOUNTER
"Requested Prescriptions   Pending Prescriptions Disp Refills     cetirizine (ZYRTEC) 10 MG tablet [Pharmacy Med Name: CETIRIZINE HCL 10 MG TABLET]    Last Written Prescription Date:  4/2/2018  Last Fill Quantity: 30,  # refills: 1   Last office visit: 4/2/2018 with prescribing provider:  Jigar Flowers     Future Office Visit:     30 tablet 1     Sig: TAKE 1 TABLET (10 MG) BY MOUTH EVERY EVENING    Antihistamines Protocol Passed    6/23/2018  5:34 PM       Passed - Patient is 3-64 years of age    Apply weight-based dosing for peds patients age 3 - 12 years of age.    Forward request to provider for patients under the age of 3 or over the age of 64.         Passed - Recent (12 mo) or future (30 days) visit within the authorizing provider's specialty    Patient had office visit in the last 12 months or has a visit in the next 30 days with authorizing provider or within the authorizing provider's specialty.  See \"Patient Info\" tab in inbasket, or \"Choose Columns\" in Meds & Orders section of the refill encounter.              "

## 2018-06-26 RX ORDER — CETIRIZINE HYDROCHLORIDE 10 MG/1
TABLET ORAL
Qty: 30 TABLET | Refills: 1 | Status: SHIPPED | OUTPATIENT
Start: 2018-06-26 | End: 2019-01-04

## 2018-06-26 NOTE — TELEPHONE ENCOUNTER
Routing refill request to provider for review/approval because:  Please advise if patient should continue use. Per 4/2/2018 LOV note, pt. Recommended to see ENT.    Sanjana ANDRADE RN, BSN, PHN  [facility] RN

## 2018-09-13 ENCOUNTER — RADIANT APPOINTMENT (OUTPATIENT)
Dept: GENERAL RADIOLOGY | Facility: CLINIC | Age: 63
End: 2018-09-13
Attending: INTERNAL MEDICINE
Payer: COMMERCIAL

## 2018-09-13 ENCOUNTER — OFFICE VISIT (OUTPATIENT)
Dept: PEDIATRICS | Facility: CLINIC | Age: 63
End: 2018-09-13
Payer: COMMERCIAL

## 2018-09-13 VITALS
OXYGEN SATURATION: 95 % | SYSTOLIC BLOOD PRESSURE: 150 MMHG | DIASTOLIC BLOOD PRESSURE: 80 MMHG | HEIGHT: 64 IN | TEMPERATURE: 98.2 F | HEART RATE: 53 BPM

## 2018-09-13 DIAGNOSIS — I10 BENIGN ESSENTIAL HYPERTENSION: ICD-10-CM

## 2018-09-13 DIAGNOSIS — M79.644 THUMB PAIN, RIGHT: ICD-10-CM

## 2018-09-13 DIAGNOSIS — R01.1 HEART MURMUR: ICD-10-CM

## 2018-09-13 DIAGNOSIS — Z00.01 ENCOUNTER FOR ROUTINE ADULT MEDICAL EXAM WITH ABNORMAL FINDINGS: Primary | ICD-10-CM

## 2018-09-13 PROCEDURE — 99213 OFFICE O/P EST LOW 20 MIN: CPT | Mod: 25 | Performed by: INTERNAL MEDICINE

## 2018-09-13 PROCEDURE — 36415 COLL VENOUS BLD VENIPUNCTURE: CPT | Performed by: INTERNAL MEDICINE

## 2018-09-13 PROCEDURE — 99396 PREV VISIT EST AGE 40-64: CPT | Mod: GC | Performed by: INTERNAL MEDICINE

## 2018-09-13 PROCEDURE — 80048 BASIC METABOLIC PNL TOTAL CA: CPT | Performed by: INTERNAL MEDICINE

## 2018-09-13 PROCEDURE — 73140 X-RAY EXAM OF FINGER(S): CPT | Mod: RT

## 2018-09-13 RX ORDER — HYDROCHLOROTHIAZIDE 12.5 MG/1
12.5 TABLET ORAL DAILY
Qty: 30 TABLET | Refills: 1 | Status: SHIPPED | OUTPATIENT
Start: 2018-09-13 | End: 2019-01-08

## 2018-09-13 ASSESSMENT — ENCOUNTER SYMPTOMS
FREQUENCY: 0
FEVER: 0
DYSURIA: 0
NERVOUS/ANXIOUS: 0
ABDOMINAL PAIN: 0
HEARTBURN: 0
EYE PAIN: 0
HEMATOCHEZIA: 0
BREAST MASS: 0
PALPITATIONS: 0
ARTHRALGIAS: 0
SORE THROAT: 0
JOINT SWELLING: 0
COUGH: 0
HEADACHES: 0
NAUSEA: 0
CHILLS: 0
DIARRHEA: 0
MYALGIAS: 0
WEAKNESS: 0
SHORTNESS OF BREATH: 0
CONSTIPATION: 1
DIZZINESS: 0
PARESTHESIAS: 0
HEMATURIA: 0

## 2018-09-13 NOTE — PROGRESS NOTES
SUBJECTIVE:   CC: Rajwinder Lama is an 63 year old woman who presents for preventive health visit.     Visit was completed with the aid of an Pan[place]ian interpretor.     Physical   Annual:     Getting at least 3 servings of Calcium per day:  Yes    Bi-annual eye exam:  Yes    Dental care twice a year:  NO    Sleep apnea or symptoms of sleep apnea:  Sleep apnea    Diet:  Other    Frequency of exercise:  2-3 days/week    Duration of exercise:  45-60 minutes    Taking medications regularly:  Yes    Medication side effects:  Not applicable    Additional concerns today:  YES (right thumb pain for 3 weeks)    Musculoskeletal problem/pain  Reports right thumb pain x 3 weeks. Initially in the base of the right thumb. Essentially a constant ache without radiation. No injury, trauma, trauma, swelling. Then 1 week ago, developed difficulty bending thumb. No hx of arthritis, gout, fevers, chills. Has some associated weakness as she is unable to grab objects well due to limited thumb mobility. Has not tried ice, heat or over the counter analgesia.    Today's PHQ-2 Score:   PHQ-2 ( 1999 [employer]) 9/13/2018   Q1: Little interest or pleasure in doing things 0   Q2: Feeling down, depressed or hopeless 0   PHQ-2 Score 0   Q1: Little interest or pleasure in doing things Not at all   Q2: Feeling down, depressed or hopeless Not at all   PHQ-2 Score 0     Abuse: Current or Past(Physical, Sexual or Emotional)- No  Do you feel safe in your environment - Yes    Social History   Substance Use Topics     Smoking status: Never Smoker     Smokeless tobacco: Never Used     Alcohol use Yes      Comment: ethipian drink rarely     Alcohol Use 9/13/2018   If you drink alcohol do you typically have greater than 3 drinks per day OR greater than 7 drinks per week? No     Patient over age 50, mutual decision to screen reflected in health maintenance. Completed in 2017, negative.  Pertinent mammograms are reviewed under the imaging tab.  History of  "abnormal Pap smear: NO - age 30-65 PAP every 5 years with negative HPV co-testing recommended  Had Positive HPV strain. Saw OB with LEEP. Recommends repeat in 1 year.  PAP / HPV Latest Ref Rng & Units 8/1/2017   PAP - NIL   HPV 16 DNA NEG Positive(A)   HPV 18 DNA NEG Negative   OTHER HR HPV NEG Negative     Review of Systems   Constitutional: Negative for chills and fever.   HENT: Negative for congestion, ear pain, hearing loss and sore throat.    Eyes: Negative for pain and visual disturbance.   Respiratory: Negative for cough and shortness of breath.    Cardiovascular: Negative for chest pain, palpitations and peripheral edema.   Gastrointestinal: Positive for constipation. Negative for abdominal pain, diarrhea, heartburn, hematochezia and nausea.   Breasts:  Negative for tenderness, breast mass and discharge.   Genitourinary: Negative for dysuria, frequency, genital sores, hematuria, pelvic pain, urgency, vaginal bleeding and vaginal discharge.   Musculoskeletal: Negative for arthralgias, joint swelling and myalgias.   Skin: Negative for rash.   Neurological: Negative for dizziness, weakness, headaches and paresthesias.   Psychiatric/Behavioral: Negative for mood changes. The patient is not nervous/anxious.       OBJECTIVE:   /80  Pulse 53  Temp 98.2  F (36.8  C) (Oral)  Ht 5' 4.25\" (1.632 m)  SpO2 95%  Physical Exam  GENERAL: healthy, alert and no distress  HEENT: PER, TM normal bilaterally, no congestion, no oral lesions, MMM  NECK: supple, no adenopathy  RESP: lungs clear to auscultation - no rales, rhonchi or wheezes  CV: RRR, loud SUREKHA at RUSB, softer in LUSB, no gallop. Radial pulse normal in right hand, with brisk cap refill.  GI: soft, non distended, non tender to palpation, no rigidity or guarding, no masses, bowel sounds active  MS: right thumb held upright, pain in the base with flexion/extension, no pain with inversion/eversion, no appreciable erythema or swelling, no warmth or drainage, no " "tophi noted. No appreciable erythema/swelling of the MCPs/DIPs/PIPs right hand. Has some ability to bend/extend right thumb, though limited by pain. Sensation intact. Left hand exam appears normal.  NEURO: Alert, no focal deficits, normal strength and tone, mentation intact and speech normal    ASSESSMENT/PLAN:   (Z00.01) Encounter for routine adult medical exam with abnormal findings  (primary encounter diagnosis)  - BMP  - FIT test (declined colonoscopy)  - declined flu vaccination (immigrated from [place] in 2017, had all vaccinations by report, negative TB testing)    (M79.644) Thumb pain, right  Diff dx includes trigger thumb vs arthritis vs gout vs tendonitis. Unlikely fracture as no hx of trauma. No reported or visualized wound to place at risk for cellulitis or osteomyelitis, no erythema/swelling/drainage/fluctuance. No fevers. Suspect arthritis vs trigger thumb. Do not suspect infectious etiology such as septic joint, osteo, cellulitis. Will obtain XR of affected area and refer to ortho.   - XR right thumb  - ortho referral  - conservative treatment with heat, OTC analgesia    (I10) Benign essential hypertension  Numerous HTN readings previously,  this visit. Has murmur suspicious for aortic stenosis. Asymptomatic.  - BMP  - start hydrochlorothiazide  - f/u 1 month    (R01.1) Heart murmur  Suspect aortic stenosis. No edema, crackles on exam. Asymptomatic.  - ECHO  - treat HTN as per above    COUNSELING:  Reviewed preventive health counseling, as reflected in patient instructions  Special attention given to:        Immunizations    Declined: Influenza           Colon cancer screening    BP Readings from Last 1 Encounters:   09/13/18 150/80     Estimated body mass index is 31.78 kg/(m^2) as calculated from the following:    Height as of 4/2/18: 5' 1.25\" (1.556 m).    Weight as of 4/3/18: 169 lb 9.6 oz (76.9 kg).    Weight management plan: exercise recommendations reviewed     reports that she has " never smoked. She has never used smokeless tobacco.    For additional instructions, see AVS   Symptoms to seek immediate medical care reviewed with patient  RTC if symptoms persist despite the above, sooner with acute worsening/red flags    Pt d/w attending physician, Dr. Francis, who agrees with plan     Jimy Kelley MD  PGY4 Med [facility] ENDY    I have seen this patient and examined him in the presence of Dr. Kelley.  I was present during the key components of the presenting complaints, physical exam, diagnosis, and plan, and fully concur with the plan as listed in the resident's note.    Ben Francis MD  Internal Medicine and Pediatrics

## 2018-09-13 NOTE — PATIENT INSTRUCTIONS
1. For the thumb, we will do an xray. We referred you to an orthopedic hand doctor for your thumb. Schedule an appointment with them in 1 week. Use tylenol or ibuprofen as needed for pain. May use up to 3,000 mg of tylenol per day. Can use 400 mg ibuprofen with food every 6 hours as needed. Also use heating pad or hot water soak for your thumb. Follow up in 1 month. Sooner if it worsens, or if there is redness, swelling, fevers.    2. High blood pressure  We will start a medication called hydrochlorothiazide today. This is 12.5 mg daily.  We will check blood tests today.  You should schedule an echocardiogram (ultrasound of the heart) at [facility] to evaluate the murmur.    3. Today  Get blood tests   the FIT test stool card  Get a thumb xray  We will call with results if urgent    Preventive Health Recommendations  Female Ages 50 - 64    Yearly exam: See your health care provider every year in order to  o Review health changes.   o Discuss preventive care.    o Review your medicines if your doctor has prescribed any.      Get a Pap test every three years (unless you have an abnormal result and your provider advises testing more often).    If you get Pap tests with HPV test, you only need to test every 5 years, unless you have an abnormal result.     You do not need a Pap test if your uterus was removed (hysterectomy) and you have not had cancer.    You should be tested each year for STDs (sexually transmitted diseases) if you're at risk.     Have a mammogram every 1 to 2 years.    Have a colonoscopy at age 50, or have a yearly FIT test (stool test). These exams screen for colon cancer.      Have a cholesterol test every 5 years, or more often if advised.    Have a diabetes test (fasting glucose) every three years. If you are at risk for diabetes, you should have this test more often.     If you are at risk for osteoporosis (brittle bone disease), think about having a bone density scan (DEXA).    Shots:  Get a flu shot each year. Get a tetanus shot every 10 years.    Nutrition:     Eat at least 5 servings of fruits and vegetables each day.    Eat whole-grain bread, whole-wheat pasta and brown rice instead of white grains and rice.    Get adequate Calcium and Vitamin D.     Lifestyle    Exercise at least 150 minutes a week (30 minutes a day, 5 days a week). This will help you control your weight and prevent disease.    Limit alcohol to one drink per day.    No smoking.     Wear sunscreen to prevent skin cancer.     See your dentist every six months for an exam and cleaning.    See your eye doctor every 1 to 2 years.

## 2018-09-13 NOTE — MR AVS SNAPSHOT
After Visit Summary   9/13/2018    Rajwinder Lama    MRN: [id number]           Patient Information     Date Of Birth          1955        Visit Information        Provider Department      9/13/2018 3:15 PM Brannon Kelley MD; LANGUAGE [facility] [place]        Today's Diagnoses     Encounter for routine adult medical exam with abnormal findings    -  1    Thumb pain, right        Benign essential hypertension        Heart murmur          Care Instructions    1. For the thumb, we will do an xray. We referred you to an orthopedic hand doctor for your thumb. Schedule an appointment with them in 1 week. Use tylenol or ibuprofen as needed for pain. May use up to 3,000 mg of tylenol per day. Can use 400 mg ibuprofen with food every 6 hours as needed. Also use heating pad or hot water soak for your thumb. Follow up in 1 month. Sooner if it worsens, or if there is redness, swelling, fevers.    2. High blood pressure  We will start a medication called hydrochlorothiazide today. This is 12.5 mg daily.  We will check blood tests today.  You should schedule an echocardiogram (ultrasound of the heart) at [facility] to evaluate the murmur.    3. Today  Get blood tests   the FIT test stool card  Get a thumb xray  We will call with results if urgent    Preventive Health Recommendations  Female Ages 50 - 64    Yearly exam: See your health care provider every year in order to  o Review health changes.   o Discuss preventive care.    o Review your medicines if your doctor has prescribed any.      Get a Pap test every three years (unless you have an abnormal result and your provider advises testing more often).    If you get Pap tests with HPV test, you only need to test every 5 years, unless you have an abnormal result.     You do not need a Pap test if your uterus was removed (hysterectomy) and you have not had cancer.    You should be tested each year for STDs (sexually transmitted  diseases) if you're at risk.     Have a mammogram every 1 to 2 years.    Have a colonoscopy at age 50, or have a yearly FIT test (stool test). These exams screen for colon cancer.      Have a cholesterol test every 5 years, or more often if advised.    Have a diabetes test (fasting glucose) every three years. If you are at risk for diabetes, you should have this test more often.     If you are at risk for osteoporosis (brittle bone disease), think about having a bone density scan (DEXA).    Shots: Get a flu shot each year. Get a tetanus shot every 10 years.    Nutrition:     Eat at least 5 servings of fruits and vegetables each day.    Eat whole-grain bread, whole-wheat pasta and brown rice instead of white grains and rice.    Get adequate Calcium and Vitamin D.     Lifestyle    Exercise at least 150 minutes a week (30 minutes a day, 5 days a week). This will help you control your weight and prevent disease.    Limit alcohol to one drink per day.    No smoking.     Wear sunscreen to prevent skin cancer.     See your dentist every six months for an exam and cleaning.    See your eye doctor every 1 to 2 years.            Follow-ups after your visit        Additional Services     ORTHO  REFERRAL       [facility] is referring you to the Orthopedic  Services at [facility].       The  Representative will assist you in the coordination of your Orthopedic and Musculoskeletal Care as prescribed by your physician.    The  Representative will call you within 1 business day to help schedule your appointment, or you may contact the  Representative at:    All areas ~ [phone]     Type of Referral : Hand       Timeframe requested: within 1 week    Coverage of these services is subject to the terms and limitations of your health insurance plan.  Please call member services at your health plan with any benefit or coverage questions.      If  "X-rays, CT or MRI's have been performed, please contact the facility where they were done to arrange for , prior to your scheduled appointment.  Please bring this referral request to your appointment and present it to your specialist.                  Future tests that were ordered for you today     Open Future Orders        Priority Expected Expires Ordered    Fecal colorectal cancer screen (FIT) Routine 10/4/2018 12/6/2018 9/13/2018    Echocardiogram Complete Routine  9/13/2019 9/13/2018            Who to contact     If you have questions or need follow up information about today's clinic visit or your schedule please contact [facility] [place] directly at [phone].  Normal or non-critical lab and imaging results will be communicated to you by SongFlamehart, letter or phone within 4 business days after the clinic has received the results. If you do not hear from us within 7 days, please contact the clinic through [employer] or phone. If you have a critical or abnormal lab result, we will notify you by phone as soon as possible.  Submit refill requests through [email or web address] or call your pharmacy and they will forward the refill request to us. Please allow 3 business days for your refill to be completed.          Additional Information About Your Visit        [employer] Information     [employer] gives you secure access to your electronic health record. If you see a primary care provider, you can also send messages to your care team and make appointments. If you have questions, please call your primary care clinic.  If you do not have a primary care provider, please call [phone] and they will assist you.        Care EveryWhere ID     This is your Care EveryWhere ID. This could be used by other organizations to access your [place] medical records  [id number]        Your Vitals Were     Pulse Temperature Height Pulse Oximetry          53 98.2  F (36.8  C) (Oral) 5' 4.25\" (1.632 m) 95%         Blood Pressure from " Last 3 Encounters:   09/13/18 150/80   04/13/18 162/82   04/03/18 124/78    Weight from Last 3 Encounters:   04/03/18 169 lb 9.6 oz (76.9 kg)   04/02/18 168 lb (76.2 kg)   02/14/18 173 lb 12.8 oz (78.8 kg)              We Performed the Following     Basic metabolic panel  (Ca, Cl, CO2, Creat, Gluc, K, Na, BUN)     ORTHO  REFERRAL     XR Finger Right G/E 2 Views          Today's Medication Changes          These changes are accurate as of 9/13/18  4:53 PM.  If you have any questions, ask your nurse or doctor.               Start taking these medicines.        Dose/Directions    hydrochlorothiazide 12.5 MG Tabs tablet   Used for:  Benign essential hypertension   Started by:  Brannon Kelley MD        Dose:  12.5 mg   Take 1 tablet (12.5 mg) by mouth daily   Quantity:  30 tablet   Refills:  1            Where to get your medicines      These medications were sent to [facility]/pharmacy #[id number] - [place], [place] - [address] [place] [address] AT [address], [place] [address]     Phone:  [phone]     hydrochlorothiazide 12.5 MG Tabs tablet                Primary Care Provider Office Phone # Fax #    [facility] [phone]       [address] [place] [address]  [place] [address]        Equal Access to Services     THERON PEÑA AH: Hadii estevan swansono Sobandar, waaxda luqadaha, qaybta kaalmada adejorgitoda, hanane payton . So [facility] [phone].    ATENCIÓN: Si habla español, tiene a jaramillo disposición servicios gratuitos de asistencia lingüística. Llame al [phone].    We comply with applicable federal civil rights laws and [place] laws. We do not discriminate on the basis of race, color, national origin, age, disability, sex, sexual orientation, or gender identity.            Thank you!     Thank you for choosing [facility]  for your care. Our goal is always to provide you with excellent care. Hearing back from our  patients is one way we can continue to improve our services. Please take a few minutes to complete the written survey that you may receive in the mail after your visit with us. Thank you!             Your Updated Medication List - Protect others around you: Learn how to safely use, store and throw away your medicines at www.disposemymeds.org.          This list is accurate as of 9/13/18  4:53 PM.  Always use your most recent med list.                   Brand Name Dispense Instructions for use Diagnosis    cetirizine 10 MG tablet    zyrTEC    30 tablet    TAKE 1 TABLET (10 MG) BY MOUTH EVERY EVENING    Nasal turbinate hypertrophy       fluticasone 50 MCG/ACT spray    FLONASE    1 Bottle    Spray 2 sprays into right nostril 2 times daily    Nasal turbinate hypertrophy       hydrochlorothiazide 12.5 MG Tabs tablet     30 tablet    Take 1 tablet (12.5 mg) by mouth daily    Benign essential hypertension       MULTIVITAMIN PO

## 2018-09-13 NOTE — LETTER
"               [facility]  [address]  [place] [address]                  [phone]   September 19, 2018    Rajwinder Lama  [address]  [place] [address]    Hi,     The lab tests from your recent clinic visit returned normal. The xray of your thumb did not show any fracture, though there was possible \"erosion\" or degeneration near the end of the finger bone, which can be caused by arthritis. Would recommend continuing the interventions discussed in office (including scheduling an appointment with orthopedic specialists) and following up.     Please call or message with questions.     Dr. [facility]       Results for orders placed or performed in visit on 09/13/18   XR Finger Right G/E 2 Views    Narrative    RIGHT FINGER TWO OR MORE VIEWS   9/13/2018 5:17 PM     HISTORY: Right thumb pain with limited mobility, evaluate for  arthritis versus gout.       Impression    IMPRESSION: No apparent fracture or subluxation. Joint space widths  are relatively normal. There may be small erosions along the margins  of the distal phalangeal base.      JIN HOYOS MD   Basic metabolic panel  (Ca, Cl, CO2, Creat, Gluc, K, Na, BUN)   Result Value Ref Range    Sodium 142 133 - 144 mmol/L    Potassium 3.6 3.4 - 5.3 mmol/L    Chloride 107 94 - 109 mmol/L    Carbon Dioxide 30 20 - 32 mmol/L    Anion Gap 5 3 - 14 mmol/L    Glucose 86 70 - 99 mg/dL    Urea Nitrogen 16 7 - 30 mg/dL    Creatinine 0.58 0.52 - 1.04 mg/dL    GFR Estimate >90 >60 mL/min/1.7m2    GFR Estimate If Black >90 >60 mL/min/1.7m2    Calcium 9.1 8.5 - 10.1 mg/dL     "

## 2018-09-14 LAB
ANION GAP SERPL CALCULATED.3IONS-SCNC: 5 MMOL/L (ref 3–14)
BUN SERPL-MCNC: 16 MG/DL (ref 7–30)
CALCIUM SERPL-MCNC: 9.1 MG/DL (ref 8.5–10.1)
CHLORIDE SERPL-SCNC: 107 MMOL/L (ref 94–109)
CO2 SERPL-SCNC: 30 MMOL/L (ref 20–32)
CREAT SERPL-MCNC: 0.58 MG/DL (ref 0.52–1.04)
GFR SERPL CREATININE-BSD FRML MDRD: >90 ML/MIN/1.7M2
GLUCOSE SERPL-MCNC: 86 MG/DL (ref 70–99)
POTASSIUM SERPL-SCNC: 3.6 MMOL/L (ref 3.4–5.3)
SODIUM SERPL-SCNC: 142 MMOL/L (ref 133–144)

## 2018-09-25 PROCEDURE — 82274 ASSAY TEST FOR BLOOD FECAL: CPT | Performed by: INTERNAL MEDICINE

## 2018-09-26 ENCOUNTER — TELEPHONE (OUTPATIENT)
Dept: PEDIATRICS | Facility: CLINIC | Age: 63
End: 2018-09-26

## 2018-09-26 ENCOUNTER — HOSPITAL ENCOUNTER (OUTPATIENT)
Dept: CARDIOLOGY | Facility: CLINIC | Age: 63
Discharge: HOME | End: 2018-09-26
Attending: INTERNAL MEDICINE | Admitting: INTERNAL MEDICINE
Payer: COMMERCIAL

## 2018-09-26 DIAGNOSIS — R01.1 HEART MURMUR: ICD-10-CM

## 2018-09-26 PROCEDURE — 93306 TTE W/DOPPLER COMPLETE: CPT

## 2018-09-26 PROCEDURE — 93306 TTE W/DOPPLER COMPLETE: CPT | Mod: 26 | Performed by: INTERNAL MEDICINE

## 2018-09-26 NOTE — TELEPHONE ENCOUNTER
Patient needs to be seen for her blood pressure ASAP. If systolic is >180, would recommend going to the ED tonight. If systolic is less than 180 can be seen in urgent care tonight. Either way, I would recommend that the patient not wait until next week to be seen.    Charo Saenz MD  Internal Medicine-Pediatrics

## 2018-09-26 NOTE — TELEPHONE ENCOUNTER
Please review: high BP, see below.  Also, please review echo done today.  Patient is asymptomatic, aware to go to the ER if needed.  Please review-only taking hydrochlorothiazide, would you like to start something else now, or see patient sooner and when ( when is it ok to fit patient-Friday would work)?      Appointment scheduled for Oct 3rd to establish care with Dr. Saenz and follow up to blood pressure.    Patient's daughter calls.  Patient had an echo today and they asked her to call the clinic to report blood pressure readings at the appointment today.  Rt arm: 223/67 and left arm 209/96.  30 minutes later  193/82.  Patient is asymptomatic.  Denies chest pain, SOB, dizziness, headaches, weakness numbness, edema, nausea, or fatigue.      She is not an established patient in the clinic, but was seen twice-by Dr. Saenz and Dr. Kelley.  Taking hydrochlorothiazide 12.5 mg daily.  Her labs were normal on 9/13. Clinic blood pressure:     BP Readings from Last 3 Encounters:   09/13/18 150/80   04/13/18 162/82   04/03/18 124/78     Advised to go to the ER with any symptoms, or worsening BP.  Daughter and patient in agreement.  Needs appointment sooner.  Had echo today.    Appointment advised and scheduled:    Next 5 appointments (look out 90 days)     Oct 03, 2018  8:10 AM CDT   SHORT with Charo Saenz MD   [facility]    [address] [place] [id number] [phone]                Harriet Alas RN  Message handled by Nurse Triage.

## 2018-09-27 ENCOUNTER — HOSPITAL ENCOUNTER (EMERGENCY)
Facility: CLINIC | Age: 63
Discharge: HOME | End: 2018-09-27
Attending: EMERGENCY MEDICINE | Admitting: EMERGENCY MEDICINE
Payer: COMMERCIAL

## 2018-09-27 VITALS
OXYGEN SATURATION: 98 % | TEMPERATURE: 97.8 F | SYSTOLIC BLOOD PRESSURE: 169 MMHG | DIASTOLIC BLOOD PRESSURE: 82 MMHG | HEART RATE: 57 BPM | RESPIRATION RATE: 18 BRPM

## 2018-09-27 DIAGNOSIS — I16.0 HYPERTENSIVE URGENCY: ICD-10-CM

## 2018-09-27 LAB
ALBUMIN UR-MCNC: NEGATIVE MG/DL
ANION GAP SERPL CALCULATED.3IONS-SCNC: 4 MMOL/L (ref 3–14)
APPEARANCE UR: CLEAR
BASOPHILS # BLD AUTO: 0 10E9/L (ref 0–0.2)
BASOPHILS NFR BLD AUTO: 0.4 %
BILIRUB UR QL STRIP: NEGATIVE
BUN SERPL-MCNC: 14 MG/DL (ref 7–30)
CALCIUM SERPL-MCNC: 8.5 MG/DL (ref 8.5–10.1)
CHLORIDE SERPL-SCNC: 107 MMOL/L (ref 94–109)
CO2 SERPL-SCNC: 31 MMOL/L (ref 20–32)
COLOR UR AUTO: YELLOW
CREAT SERPL-MCNC: 0.57 MG/DL (ref 0.52–1.04)
DIFFERENTIAL METHOD BLD: NORMAL
EOSINOPHIL # BLD AUTO: 0.1 10E9/L (ref 0–0.7)
EOSINOPHIL NFR BLD AUTO: 1.4 %
ERYTHROCYTE [DISTWIDTH] IN BLOOD BY AUTOMATED COUNT: 12.3 % (ref 10–15)
GFR SERPL CREATININE-BSD FRML MDRD: >90 ML/MIN/1.7M2
GLUCOSE SERPL-MCNC: 96 MG/DL (ref 70–99)
GLUCOSE UR STRIP-MCNC: NEGATIVE MG/DL
HCT VFR BLD AUTO: 36.6 % (ref 35–47)
HGB BLD-MCNC: 12.5 G/DL (ref 11.7–15.7)
HGB UR QL STRIP: NEGATIVE
IMM GRANULOCYTES # BLD: 0 10E9/L (ref 0–0.4)
IMM GRANULOCYTES NFR BLD: 0.4 %
INTERPRETATION ECG - MUSE: NORMAL
KETONES UR STRIP-MCNC: NEGATIVE MG/DL
LEUKOCYTE ESTERASE UR QL STRIP: NEGATIVE
LYMPHOCYTES # BLD AUTO: 1.5 10E9/L (ref 0.8–5.3)
LYMPHOCYTES NFR BLD AUTO: 30.2 %
MCH RBC QN AUTO: 30.4 PG (ref 26.5–33)
MCHC RBC AUTO-ENTMCNC: 34.2 G/DL (ref 31.5–36.5)
MCV RBC AUTO: 89 FL (ref 78–100)
MONOCYTES # BLD AUTO: 0.4 10E9/L (ref 0–1.3)
MONOCYTES NFR BLD AUTO: 8 %
MUCOUS THREADS #/AREA URNS LPF: PRESENT /LPF
NEUTROPHILS # BLD AUTO: 2.9 10E9/L (ref 1.6–8.3)
NEUTROPHILS NFR BLD AUTO: 59.6 %
NITRATE UR QL: NEGATIVE
NRBC # BLD AUTO: 0 10*3/UL
NRBC BLD AUTO-RTO: 0 /100
PH UR STRIP: 7 PH (ref 5–7)
PLATELET # BLD AUTO: 161 10E9/L (ref 150–450)
POTASSIUM SERPL-SCNC: 3.6 MMOL/L (ref 3.4–5.3)
RBC # BLD AUTO: 4.11 10E12/L (ref 3.8–5.2)
RBC #/AREA URNS AUTO: <1 /HPF (ref 0–2)
SODIUM SERPL-SCNC: 142 MMOL/L (ref 133–144)
SOURCE: ABNORMAL
SP GR UR STRIP: 1.01 (ref 1–1.03)
SQUAMOUS #/AREA URNS AUTO: 1 /HPF (ref 0–1)
TROPONIN I SERPL-MCNC: <0.015 UG/L (ref 0–0.04)
UROBILINOGEN UR STRIP-MCNC: 0 MG/DL (ref 0–2)
WBC # BLD AUTO: 4.9 10E9/L (ref 4–11)
WBC #/AREA URNS AUTO: 1 /HPF (ref 0–5)

## 2018-09-27 PROCEDURE — 85025 COMPLETE CBC W/AUTO DIFF WBC: CPT | Performed by: EMERGENCY MEDICINE

## 2018-09-27 PROCEDURE — 93005 ELECTROCARDIOGRAM TRACING: CPT

## 2018-09-27 PROCEDURE — 84484 ASSAY OF TROPONIN QUANT: CPT | Performed by: EMERGENCY MEDICINE

## 2018-09-27 PROCEDURE — 81001 URINALYSIS AUTO W/SCOPE: CPT | Performed by: EMERGENCY MEDICINE

## 2018-09-27 PROCEDURE — 99284 EMERGENCY DEPT VISIT MOD MDM: CPT

## 2018-09-27 PROCEDURE — 80048 BASIC METABOLIC PNL TOTAL CA: CPT | Performed by: EMERGENCY MEDICINE

## 2018-09-27 PROCEDURE — 36415 COLL VENOUS BLD VENIPUNCTURE: CPT | Performed by: EMERGENCY MEDICINE

## 2018-09-27 ASSESSMENT — ENCOUNTER SYMPTOMS
HEADACHES: 0
SHORTNESS OF BREATH: 0
WEAKNESS: 0
SPEECH DIFFICULTY: 0

## 2018-09-27 NOTE — ED AVS SNAPSHOT
[facility] Emergency [facility]    [address]    [facility] [address]    Phone:  [phone]    Fax:  [phone]                                       Rajwinder Lama   MRN: [id number]    Department:  [facility] Emergency [facility]   Date of Visit:  9/27/2018           After Visit Summary Signature Page     I have received my discharge instructions, and my questions have been answered. I have discussed any challenges I see with this plan with the nurse or doctor.    ..........................................................................................................................................  Patient/Patient Representative Signature      ..........................................................................................................................................  Patient Representative Print Name and Relationship to Patient    ..................................................               ................................................  Date                                   Time    ..........................................................................................................................................  Reviewed by Signature/Title    ...................................................              ..............................................  Date                                               Time          22EPIC Rev 08/18

## 2018-09-27 NOTE — TELEPHONE ENCOUNTER
ADE to Dr. Saenz: I have advised ER last night, but the problem was that she was at the hospital and had an echo and was told to go home, not ER.    I called patient again and spoke with daughter-advised to go to ER again-she denies, patient aware.  We talked for a while, she is in agreement and will go the [facility].    Harriet Alas RN  Message handled by Nurse Triage.

## 2018-09-27 NOTE — ED PROVIDER NOTES
History     Chief Complaint:  Hypertension      HPI   The patient's daughter was used to translate and obtain the below history as well as to explain diagnosis, plan of treatment, reasons to return to the emergency department and appropriate follow up.     Rajwinder Lama is a healthy 63 year old female who presents to the ED for evaluation of hypertension. The patient reports that she was seen by her PCP on 9/13 and diagnosed with hypertension at that time. She was prescribed hydrochlorothiazide and sent for an echo yesterday. At this most recent appointment, the patient was observed to have a blood pressure of 223/90. She was told to contact her PCP, who referred her to the ED for workup. Here, the patient denies having any chest pain, shortness of breath, headache, abdominal pain, or neurologic symptoms of weakness or slurred speech, and she has no other complaints. Of note, the patient has not yet taken her hydrochlorothiazide yet this morning. She also reports drinking one cup of coffee daily. Of note, the etiology of the patient's hypertension is unknown and she reports normal blood pressure before her diagnosis.    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  Alcohol use: yes  Marital Status:   [2]    Review of Systems   Respiratory: Negative for shortness of breath.    Cardiovascular: Negative for chest pain.   Neurological: Negative for speech difficulty, weakness and headaches.   All other systems reviewed and are negative.      Physical Exam   First Vitals:  BP: 179/83  Pulse: 57  Heart Rate: (!) 48  Temp: 97.8  F (36.6  C)  Resp: 18  SpO2: 94 %   BP: 169/82    Physical Exam  General: The patient is alert, in no respiratory  distress.    HENT: Mucous membranes moist.    Cardiovascular: Regular rate and rhythm. Good pulses in all four extremities. Normal capillary refill and skin turgor.     Respiratory: Lungs are clear. No nasal flaring. No retractions. No wheezing, no crackles.    Gastrointestinal: Abdomen soft. No guarding, no rebound. No palpable hernias.     Musculoskeletal: No gross deformity.     Skin: No rashes or petechiae.     Neurologic: The patient is alert. GCS 15. No testable cranial nerve deficit. Follows commands. Gives appropriate answers. Good strength in all extremities. No gross neurologic deficit. Gross sensation intact. Pupils are round and reactive. No meningismus.     Lymphatic: No cervical adenopathy. No lower extremity swelling.    Psychiatric: The patient is non-tearful.     Emergency Department Course   ECG:  Indication: Hypertension  Time: 0935  Vent. Rate 53 bpm. CA interval 154. QRS duration 82. QT/QTc 452/424. P-R-T axis 60 12 58.  Sinus bradycardia with premature atrial complexes. Otherwise normal ECG. Read time: 0940     Laboratory:  CBC: WBC: 4.9, HGB: 12.5, PLT: 161  BMP: All WNL (Creatinine: 0.57)    UA with Microscopic: Mucous present (A), o/w WNL    0922 Troponin: <0.015      Emergency Department Course:  Nursing notes and vitals reviewed. (0840) I performed an exam of the patient as documented above.     Blood drawn. This was sent to the lab for further testing, results above.    The patient provided a urine sample here in the emergency department. This was sent for laboratory testing, findings above.      EKG obtained in the ED, see results above.      (3800) I rechecked the patient and discussed the results of her workup thus far. She felt comfortable going home at this point.    Findings and plan explained to the Patient. Patient discharged home with instructions regarding supportive care, medications, and reasons to return. The importance of close follow-up was reviewed.     I personally  reviewed the laboratory results with the Patient and answered all related questions prior to discharge.     Impression & Plan    Medical Decision Making:  The patient's daughter did interpret for her. The patient has had elevated blood pressure, and there have been no symptoms associated with this such as chest pain, weakness, or other neurologic features. Her labs were checked and are reassuring. There are no signs of any end-organ damage, and without treatment her blood pressure came down from what they said was 220 yesterday to 160 here, and she will follow up with her PCP and will continue with hydrochlorothiazide.    Diagnosis:    ICD-10-CM    1. Hypertensive urgency I16.0        Disposition:  discharged to home    Scribe Disclosure:  I, Therese Turner, am serving as a scribe on 9/27/2018 at 8:40 AM to personally document services performed by Lupillo Reyes MD based on my observations and the provider's statements to me.      Therese Turner  9/27/2018   [facility] EMERGENCY DEPARTMENT       Lupillo Reyes MD  09/27/18 [id number]

## 2018-09-27 NOTE — ED AVS SNAPSHOT
[facility] Emergency Department    [address] [place] [address]    Phone:  [phone]    Fax:  [phone]                                       Rajwinder Lama   MRN: [id number]    Department:  [facility] Emergency Department   Date of Visit:  9/27/2018           Patient Information     Date Of Birth          1955        Your diagnoses for this visit were:     Hypertensive urgency        You were seen by Ebony Moon MD and Lupillo Reyes MD.      Follow-up Information     Follow up with Charo Saenz MD. Schedule an appointment as soon as possible for a visit in 1 week.    Specialty:  Internal Medicine    Contact information:    [address] [place] [address] [address] [place] [address]  [phone]          Discharge Instructions       Discharge Instructions  Hypertension - High Blood Pressure    During you visit to the Emergency Department, your blood pressure was higher than the recommended blood pressure.  This may be related to stress, pain, medication or other temporary conditions. In these cases, your blood pressure may return to normal on its own. If you have a history of high blood pressure, you may need to have your doctor adjust your medications. Sometimes, your high measurement here may indicate that you have developed high blood pressure that will stay high unless it is treated. Sudden very high blood pressure can cause problems, but usually high blood pressure causes problems over months to years.      Blood pressure is almost never lowered in the Emergency Department, because studies have shown that lowering blood pressure too quickly is much more dangerous than leaving it alone.    You need to follow up with your doctor in 1-3 days to get your blood pressure rechecked.     Return to the Emergency Department if you start to have:    A severe headache.    Chest pain.    Shortness of breath.    Weakness or numbness that affects one part of the  body.    Confusion.    Vision changes.    Significant swelling of legs and/or eyes.    A reaction to any medication started in the Emergency Department.    What can I do to help myself?    Avoid alcohol.    Take any blood pressure medicine that you are prescribed.    Get a good night s sleep.    Lower your salt intake.    Exercise.    Lose weight.    Manage stress.    If blood pressure medication was started in the Emergency Department:    The medicine may not have an immediate effect. The body and brain determine what blood pressure you have. The medicine s job is to retrain the body s  thermostat  to a lower blood pressure.    You will need to follow up with your doctor to see how this medicine is working for you.  If you were given a prescription for medicine here today, be sure to read all of the information (including the package insert) that comes with your prescription.  This will include important information about the medicine, its side effects, and any warnings that you need to know about.  The pharmacist who fills the prescription can provide more information and answer questions you may have about the medicine.  If you have questions or concerns that the pharmacist cannot address, please call or return to the Emergency Department.   Opioid Medication Information    Pain medications are among the most commonly prescribed medicines, so we are including this information for all our patients. If you did not receive pain medication or get a prescription for pain medicine, you can ignore it.     You may have been given a prescription for an opioid (narcotic) pain medicine and/or have received a pain medicine while here in the Emergency Department. These medicines can make you drowsy or impaired. You must not drive, operate dangerous equipment, or engage in any other dangerous activities while taking these medications. If you drive while taking these medications, you could be arrested for DUI, or driving under  the influence. Do not drink any alcohol while you are taking these medications.     Opioid pain medications can cause addiction. If you have a history of chemical dependency of any type, you are at a higher risk of becoming addicted to pain medications.  Only take these prescribed medications to treat your pain when all other options have been tried. Take it for as short a time and as few doses as possible. Store your pain pills in a secure place, as they are frequently stolen and provide a dangerous opportunity for children or visitors in your house to start abusing these powerful medications. We will not replace any lost or stolen medicine.  As soon as your pain is better, you should flush all your remaining medication.     Many prescription pain medications contain Tylenol  (acetaminophen), including Vicodin , Tylenol #3 , Norco , Lortab , and Percocet .  You should not take any extra pills of Tylenol  if you are using these prescription medications or you can get very sick.  Do not ever take more than 3000 mg of acetaminophen in any 24 hour period.    All opioids tend to cause constipation. Drink plenty of water and eat foods that have a lot of fiber, such as fruits, vegetables, prune juice, apple juice and high fiber cereal.  Take a laxative if you don t move your bowels at least every other day. Miralax , Milk of Magnesia, Colace , or Senna  can be used to keep you regular.      Remember that you can always come back to the Emergency Department if you are not able to see your regular doctor in the amount of time listed above, if you get any new symptoms, or if there is anything that worries you.        Your next 10 appointments already scheduled     Oct 03, 2018  8:10 AM CDT   SHORT with Charo Saenz MD   [facility]    [address]  [address]  [facility] [address] [phone]              24 Hour Appointment Hotline       To make an appointment at  any [facility] clinic, call [phone] ([phone]). If you don't have a family doctor or clinic, we will help you find one. [facility] clinics are conveniently located to serve the needs of you and your family.             Review of your medicines      Our records show that you are taking the medicines listed below. If these are incorrect, please call your family doctor or clinic.        Dose / Directions Last dose taken    cetirizine 10 MG tablet   Commonly known as:  zyrTEC   Quantity:  30 tablet        TAKE 1 TABLET (10 MG) BY MOUTH EVERY EVENING   Refills:  1        fluticasone 50 MCG/ACT spray   Commonly known as:  FLONASE   Dose:  2 spray   Quantity:  1 Bottle        Spray 2 sprays into right nostril 2 times daily   Refills:  11        hydrochlorothiazide 12.5 MG Tabs tablet   Dose:  12.5 mg   Quantity:  30 tablet        Take 1 tablet (12.5 mg) by mouth daily   Refills:  1        MULTIVITAMIN PO        Refills:  0                Procedures and tests performed during your visit     Basic metabolic panel    CBC with platelets differential    EKG 12-lead, tracing only    Troponin I    UA with Microscopic      Orders Needing Specimen Collection     None      Pending Results     No orders found from 9/25/2018 to 9/28/2018.            Pending Culture Results     No orders found from 9/25/2018 to 9/28/2018.            Pending Results Instructions     If you had any lab results that were not finalized at the time of your Discharge, you can call the ED Lab Result RN at [phone]. You will be contacted by this team for any positive Lab results or changes in treatment. The nurses are available 7 days a week from 10A to 6:30P.  You can leave a message 24 hours per day and they will return your call.        Test Results From Your Hospital Stay        9/27/2018 10:53 AM      Component Results     Component Value Ref Range & Units Status    Color Urine Yellow  Final    Appearance Urine Clear  Final    Glucose Urine  Negative NEG^Negative mg/dL Final    Bilirubin Urine Negative NEG^Negative Final    Ketones Urine Negative NEG^Negative mg/dL Final    Specific Gravity Urine 1.013 1.003 - 1.035 Final    Blood Urine Negative NEG^Negative Final    pH Urine 7.0 5.0 - 7.0 pH Final    Protein Albumin Urine Negative NEG^Negative mg/dL Final    Urobilinogen mg/dL 0.0 0.0 - 2.0 mg/dL Final    Nitrite Urine Negative NEG^Negative Final    Leukocyte Esterase Urine Negative NEG^Negative Final    Source Midstream Urine  Final    WBC Urine 1 0 - 5 /HPF Final    RBC Urine <1 0 - 2 /HPF Final    Squamous Epithelial /HPF Urine 1 0 - 1 /HPF Final    Mucous Urine Present (A) NEG^Negative /LPF Final         9/27/2018  9:55 AM      Component Results     Component Value Ref Range & Units Status    Sodium 142 133 - 144 mmol/L Final    Potassium 3.6 3.4 - 5.3 mmol/L Final    Chloride 107 94 - 109 mmol/L Final    Carbon Dioxide 31 20 - 32 mmol/L Final    Anion Gap 4 3 - 14 mmol/L Final    Glucose 96 70 - 99 mg/dL Final    Urea Nitrogen 14 7 - 30 mg/dL Final    Creatinine 0.57 0.52 - 1.04 mg/dL Final    GFR Estimate >90 >60 mL/min/1.7m2 Final    Non  GFR Calc    GFR Estimate If Black >90 >60 mL/min/1.7m2 Final    African American GFR Calc    Calcium 8.5 8.5 - 10.1 mg/dL Final         9/27/2018  9:34 AM      Component Results     Component Value Ref Range & Units Status    WBC 4.9 4.0 - 11.0 10e9/L Final    RBC Count 4.11 3.8 - 5.2 10e12/L Final    Hemoglobin 12.5 11.7 - 15.7 g/dL Final    Hematocrit 36.6 35.0 - 47.0 % Final    MCV 89 78 - 100 fl Final    MCH 30.4 26.5 - 33.0 pg Final    MCHC 34.2 31.5 - 36.5 g/dL Final    RDW 12.3 10.0 - 15.0 % Final    Platelet Count 161 150 - 450 10e9/L Final    Diff Method Automated Method  Final    % Neutrophils 59.6 % Final    % Lymphocytes 30.2 % Final    % Monocytes 8.0 % Final    % Eosinophils 1.4 % Final    % Basophils 0.4 % Final    % Immature Granulocytes 0.4 % Final    Nucleated RBCs 0 0 /100  Final    Absolute Neutrophil 2.9 1.6 - 8.3 10e9/L Final    Absolute Lymphocytes 1.5 0.8 - 5.3 10e9/L Final    Absolute Monocytes 0.4 0.0 - 1.3 10e9/L Final    Absolute Eosinophils 0.1 0.0 - 0.7 10e9/L Final    Absolute Basophils 0.0 0.0 - 0.2 10e9/L Final    Abs Immature Granulocytes 0.0 0 - 0.4 10e9/L Final    Absolute Nucleated RBC 0.0  Final         9/27/2018  9:56 AM      Component Results     Component Value Ref Range & Units Status    Troponin I ES <0.015 0.000 - 0.045 ug/L Final    The 99th percentile for upper reference range is 0.045 ug/L.  Troponin values   in the range of 0.045 - 0.120 ug/L may be associated with risks of adverse   clinical events.                  Clinical Quality Measure: Blood Pressure Screening     Your blood pressure was checked while you were in the emergency department today. The last reading we obtained was  BP: 171/79 . Please read the guidelines below about what these numbers mean and what you should do about them.  If your systolic blood pressure (the top number) is less than 120 and your diastolic blood pressure (the bottom number) is less than 80, then your blood pressure is normal. There is nothing more that you need to do about it.  If your systolic blood pressure (the top number) is 120-139 or your diastolic blood pressure (the bottom number) is 80-89, your blood pressure may be higher than it should be. You should have your blood pressure rechecked within a year by a primary care provider.  If your systolic blood pressure (the top number) is 140 or greater or your diastolic blood pressure (the bottom number) is 90 or greater, you may have high blood pressure. High blood pressure is treatable, but if left untreated over time it can put you at risk for heart attack, stroke, or kidney failure. You should have your blood pressure rechecked by a primary care provider within the next 4 weeks.  If your provider in the emergency department today gave you specific instructions to  follow-up with your doctor or provider even sooner than that, you should follow that instruction and not wait for up to 4 weeks for your follow-up visit.        Thank you for choosing [facility]       Thank you for choosing [facility] for your care. Our goal is always to provide you with excellent care. Hearing back from our patients is one way we can continue to improve our services. Please take a few minutes to complete the written survey that you may receive in the mail after you visit with us. Thank you!        [employer] Information     [employer] gives you secure access to your electronic health record. If you see a primary care provider, you can also send messages to your care team and make appointments. If you have questions, please call your primary care clinic.  If you do not have a primary care provider, please call [phone] and they will assist you.        Care EveryWhere ID     This is your Care EveryWhere ID. This could be used by other organizations to access your [facility] medical records  [id number]        Equal Access to Services     TRACEY PEÑA : Hadii estevan Gómez, jose manuel presley, xiomara sparks, hanane rosales. So [facility] [phone].    ATENCIÓN: Si habla español, tiene a jaramillo disposición servicios gratuitos de asistencia lingüística. Llame al [phone].    We comply with applicable federal civil rights laws and [place] laws. We do not discriminate on the basis of race, color, national origin, age, disability, sex, sexual orientation, or gender identity.            After Visit Summary       This is your record. Keep this with you and show to your community pharmacist(s) and doctor(s) at your next visit.

## 2018-09-27 NOTE — ED TRIAGE NOTES
Patient presents with complaints of high blood pressure. Patient had readings of over 200 systolic yesterday and her PCP told her to come to ER. Patient was recently diagnosed with hypertension and started on hydrochlorothiazide. Patient has not take dose today. She is asymptomatic with the high blood pressures.  ABC intact without need for intervention at this time.

## 2018-09-28 DIAGNOSIS — Z00.01 ENCOUNTER FOR ROUTINE ADULT MEDICAL EXAM WITH ABNORMAL FINDINGS: ICD-10-CM

## 2018-09-28 LAB — HEMOCCULT STL QL IA: NEGATIVE

## 2018-11-20 PROBLEM — I10 BENIGN ESSENTIAL HYPERTENSION: Status: ACTIVE | Noted: 2018-09-13

## 2018-11-20 PROBLEM — I10 BENIGN ESSENTIAL HYPERTENSION: Status: ACTIVE | Noted: 2018-11-20

## 2018-11-20 PROBLEM — I10 BENIGN ESSENTIAL HYPERTENSION: Status: ACTIVE | Noted: 2018-10-13

## 2019-01-04 ENCOUNTER — OFFICE VISIT (OUTPATIENT)
Dept: PEDIATRICS | Facility: CLINIC | Age: 64
End: 2019-01-04

## 2019-01-04 ENCOUNTER — APPOINTMENT (OUTPATIENT)
Dept: CT IMAGING | Facility: CLINIC | Age: 64
End: 2019-01-04

## 2019-01-04 ENCOUNTER — HOSPITAL ENCOUNTER (EMERGENCY)
Facility: CLINIC | Age: 64
Discharge: HOME | End: 2019-01-04
Attending: EMERGENCY MEDICINE | Admitting: EMERGENCY MEDICINE

## 2019-01-04 ENCOUNTER — APPOINTMENT (OUTPATIENT)
Dept: GENERAL RADIOLOGY | Facility: CLINIC | Age: 64
End: 2019-01-04

## 2019-01-04 ENCOUNTER — TELEPHONE (OUTPATIENT)
Dept: PEDIATRICS | Facility: CLINIC | Age: 64
End: 2019-01-04

## 2019-01-04 VITALS
DIASTOLIC BLOOD PRESSURE: 94 MMHG | RESPIRATION RATE: 16 BRPM | TEMPERATURE: 98 F | HEART RATE: 63 BPM | OXYGEN SATURATION: 100 % | SYSTOLIC BLOOD PRESSURE: 146 MMHG

## 2019-01-04 VITALS
TEMPERATURE: 98 F | DIASTOLIC BLOOD PRESSURE: 90 MMHG | WEIGHT: 173.2 LBS | BODY MASS INDEX: 29.5 KG/M2 | SYSTOLIC BLOOD PRESSURE: 198 MMHG | HEART RATE: 63 BPM | OXYGEN SATURATION: 98 %

## 2019-01-04 DIAGNOSIS — K21.9 GASTROESOPHAGEAL REFLUX DISEASE, ESOPHAGITIS PRESENCE NOT SPECIFIED: ICD-10-CM

## 2019-01-04 DIAGNOSIS — I16.1 HYPERTENSIVE EMERGENCY: Primary | ICD-10-CM

## 2019-01-04 DIAGNOSIS — I16.0 HYPERTENSIVE URGENCY: ICD-10-CM

## 2019-01-04 LAB
ALBUMIN SERPL-MCNC: 3.2 G/DL (ref 3.4–5)
ALBUMIN UR-MCNC: NEGATIVE MG/DL
ALP SERPL-CCNC: 58 U/L (ref 40–150)
ALT SERPL W P-5'-P-CCNC: 25 U/L (ref 0–50)
ANION GAP SERPL CALCULATED.3IONS-SCNC: 5 MMOL/L (ref 3–14)
APPEARANCE UR: CLEAR
AST SERPL W P-5'-P-CCNC: 20 U/L (ref 0–45)
BASOPHILS # BLD AUTO: 0 10E9/L (ref 0–0.2)
BASOPHILS NFR BLD AUTO: 0.3 %
BILIRUB SERPL-MCNC: 0.2 MG/DL (ref 0.2–1.3)
BILIRUB UR QL STRIP: NEGATIVE
BUN SERPL-MCNC: 14 MG/DL (ref 7–30)
CALCIUM SERPL-MCNC: 8.9 MG/DL (ref 8.5–10.1)
CHLORIDE SERPL-SCNC: 108 MMOL/L (ref 94–109)
CO2 SERPL-SCNC: 31 MMOL/L (ref 20–32)
COLOR UR AUTO: COLORLESS
CREAT SERPL-MCNC: 0.49 MG/DL (ref 0.52–1.04)
DIFFERENTIAL METHOD BLD: NORMAL
EOSINOPHIL # BLD AUTO: 0.1 10E9/L (ref 0–0.7)
EOSINOPHIL NFR BLD AUTO: 1.8 %
ERYTHROCYTE [DISTWIDTH] IN BLOOD BY AUTOMATED COUNT: 12.1 % (ref 10–15)
GFR SERPL CREATININE-BSD FRML MDRD: >90 ML/MIN/{1.73_M2}
GLUCOSE SERPL-MCNC: 88 MG/DL (ref 70–99)
GLUCOSE UR STRIP-MCNC: NEGATIVE MG/DL
HCT VFR BLD AUTO: 38.4 % (ref 35–47)
HGB BLD-MCNC: 13.1 G/DL (ref 11.7–15.7)
HGB UR QL STRIP: NEGATIVE
IMM GRANULOCYTES # BLD: 0 10E9/L (ref 0–0.4)
IMM GRANULOCYTES NFR BLD: 0.2 %
INR PPP: 1.01 (ref 0.86–1.14)
KETONES UR STRIP-MCNC: NEGATIVE MG/DL
LEUKOCYTE ESTERASE UR QL STRIP: ABNORMAL
LYMPHOCYTES # BLD AUTO: 2 10E9/L (ref 0.8–5.3)
LYMPHOCYTES NFR BLD AUTO: 29.9 %
MAGNESIUM SERPL-MCNC: 2.1 MG/DL (ref 1.6–2.3)
MCH RBC QN AUTO: 30.2 PG (ref 26.5–33)
MCHC RBC AUTO-ENTMCNC: 34.1 G/DL (ref 31.5–36.5)
MCV RBC AUTO: 89 FL (ref 78–100)
MONOCYTES # BLD AUTO: 0.7 10E9/L (ref 0–1.3)
MONOCYTES NFR BLD AUTO: 10.1 %
NEUTROPHILS # BLD AUTO: 3.8 10E9/L (ref 1.6–8.3)
NEUTROPHILS NFR BLD AUTO: 57.7 %
NITRATE UR QL: NEGATIVE
NRBC # BLD AUTO: 0 10*3/UL
NRBC BLD AUTO-RTO: 0 /100
PH UR STRIP: 8 PH (ref 5–7)
PLATELET # BLD AUTO: 151 10E9/L (ref 150–450)
POTASSIUM SERPL-SCNC: 3.5 MMOL/L (ref 3.4–5.3)
PROT SERPL-MCNC: 6.6 G/DL (ref 6.8–8.8)
RBC # BLD AUTO: 4.34 10E12/L (ref 3.8–5.2)
RBC #/AREA URNS AUTO: 1 /HPF (ref 0–2)
SODIUM SERPL-SCNC: 144 MMOL/L (ref 133–144)
SOURCE: ABNORMAL
SP GR UR STRIP: 1 (ref 1–1.03)
TROPONIN I SERPL-MCNC: <0.015 UG/L (ref 0–0.04)
TSH SERPL DL<=0.005 MIU/L-ACNC: 1.23 MU/L (ref 0.4–4)
UROBILINOGEN UR STRIP-MCNC: 0 MG/DL (ref 0–2)
WBC # BLD AUTO: 6.5 10E9/L (ref 4–11)
WBC #/AREA URNS AUTO: 5 /HPF (ref 0–5)

## 2019-01-04 PROCEDURE — 80053 COMPREHEN METABOLIC PANEL: CPT | Performed by: EMERGENCY MEDICINE

## 2019-01-04 PROCEDURE — 71046 X-RAY EXAM CHEST 2 VIEWS: CPT

## 2019-01-04 PROCEDURE — 93000 ELECTROCARDIOGRAM COMPLETE: CPT | Performed by: INTERNAL MEDICINE

## 2019-01-04 PROCEDURE — 83735 ASSAY OF MAGNESIUM: CPT | Performed by: EMERGENCY MEDICINE

## 2019-01-04 PROCEDURE — 84443 ASSAY THYROID STIM HORMONE: CPT | Performed by: EMERGENCY MEDICINE

## 2019-01-04 PROCEDURE — 93005 ELECTROCARDIOGRAM TRACING: CPT

## 2019-01-04 PROCEDURE — 85610 PROTHROMBIN TIME: CPT | Performed by: EMERGENCY MEDICINE

## 2019-01-04 PROCEDURE — 99285 EMERGENCY DEPT VISIT HI MDM: CPT | Mod: 25

## 2019-01-04 PROCEDURE — 99215 OFFICE O/P EST HI 40 MIN: CPT | Performed by: INTERNAL MEDICINE

## 2019-01-04 PROCEDURE — 25000128 H RX IP 250 OP 636: Performed by: EMERGENCY MEDICINE

## 2019-01-04 PROCEDURE — 70450 CT HEAD/BRAIN W/O DYE: CPT

## 2019-01-04 PROCEDURE — 85025 COMPLETE CBC W/AUTO DIFF WBC: CPT | Performed by: EMERGENCY MEDICINE

## 2019-01-04 PROCEDURE — 84484 ASSAY OF TROPONIN QUANT: CPT | Performed by: EMERGENCY MEDICINE

## 2019-01-04 PROCEDURE — 96374 THER/PROPH/DIAG INJ IV PUSH: CPT

## 2019-01-04 PROCEDURE — 81001 URINALYSIS AUTO W/SCOPE: CPT | Performed by: EMERGENCY MEDICINE

## 2019-01-04 RX ORDER — LISINOPRIL 5 MG/1
5 TABLET ORAL DAILY
Qty: 30 TABLET | Refills: 0 | Status: SHIPPED | OUTPATIENT
Start: 2019-01-04 | End: 2019-01-08

## 2019-01-04 RX ORDER — LIDOCAINE 40 MG/G
CREAM TOPICAL
Status: DISCONTINUED | OUTPATIENT
Start: 2019-01-04 | End: 2019-01-04 | Stop reason: HOSPADM

## 2019-01-04 RX ORDER — LABETALOL HYDROCHLORIDE 5 MG/ML
20 INJECTION, SOLUTION INTRAVENOUS ONCE
Status: DISCONTINUED | OUTPATIENT
Start: 2019-01-04 | End: 2019-01-04

## 2019-01-04 RX ORDER — HYDRALAZINE HYDROCHLORIDE 20 MG/ML
10 INJECTION INTRAMUSCULAR; INTRAVENOUS ONCE
Status: COMPLETED | OUTPATIENT
Start: 2019-01-04 | End: 2019-01-04

## 2019-01-04 RX ADMIN — HYDRALAZINE HYDROCHLORIDE 10 MG: 20 INJECTION INTRAMUSCULAR; INTRAVENOUS at 19:40

## 2019-01-04 ASSESSMENT — ENCOUNTER SYMPTOMS
SHORTNESS OF BREATH: 0
HEADACHES: 1

## 2019-01-04 NOTE — ED AVS SNAPSHOT
[facility] Emergency Department  [address]  [facility] [address]  Phone:  [phone]  Fax:  [phone]                                    Rajwinder Lama   MRN: [id number]    Department:  [facility] Emergency Department   Date of Visit:  1/4/2019           After Visit Summary Signature Page    I have received my discharge instructions, and my questions have been answered. I have discussed any challenges I see with this plan with the nurse or doctor.    ..........................................................................................................................................  Patient/Patient Representative Signature      ..........................................................................................................................................  Patient Representative Print Name and Relationship to Patient    ..................................................               ................................................  Date                                   Time    ..........................................................................................................................................  Reviewed by Signature/Title    ...................................................              ..............................................  Date                                               Time          22EPIC Rev 08/18

## 2019-01-04 NOTE — PROGRESS NOTES
SUBJECTIVE:   Rajwinder Lama is a 64 year old female who presents to clinic today for the following health issues:    Hypertension Follow-up    Outpatient blood pressures are being checked at [facility].  Results are 184/80's 165/84.    Low Salt Diet: low salt    Amount of exercise or physical activity: None    Problems taking medications regularly: No    Medication side effects: none    Diet: regular (no restrictions)    Headaches      Duration: 3 weeks    Description  Location: bilateral in the frontal area   Character: dull pain  Frequency:  Everyday  Duration:  Constant    Intensity:  8/10    Accompanying signs and symptoms: High BP    Precipitating or Alleviating factors:  Nausea/vomiting: Nausea  Dizziness: sometimes  Weakness or numbness: no  Visual changes: Blurry vision  Fever: no   Sinus or URI symptoms no     History  Head trauma: no   Family history of migraines: no   Previous tests for headaches: no   Neurologist evaluations: no   Able to do daily activities when headache present: no   Wake with headaches: YES  Daily pain medication use: nono  Any changes in: Anxiety    Precipitating or Alleviating factors (light/sound/sleep/caffeine): unknown    Therapies tried and outcome: Ibuprofen (Advil, Motrin)    Outcome - usually effective  Frequent/daily pain medication use: no     63 yo Indian pt here with complaint of headaches x 2-3 weeks, worsening in the last couple days and new onset epigastric pain x 1 day, she reports feels like heart burn.  No weakness or numbness.  Epigastric pain is constant, not related to position, not worse or better with eating, not related to exertion either.  No SOB.  No syncope.  Of note, she has been sent to the ER in the past for hypertensive emergency, was given medications but stopped when they ran out.  She has been off all antihypertensive medications now for a couple of months.    Problem list and histories reviewed & adjusted, as indicated.  Additional history: as  documented    Patient Active Problem List   Diagnosis     Cervical high risk HPV (human papillomavirus) test positive     Focal lesion of iris     Benign essential hypertension     Past Surgical History:   Procedure Laterality Date     CYSTECTOMY OVARIAN BENIGN N/A      gall stone      gallbladder remains       Social History     Tobacco Use     Smoking status: Never Smoker     Smokeless tobacco: Never Used   Substance Use Topics     Alcohol use: Yes     Comment: ethipian drink rarely     Family History   Problem Relation Age of Onset     Coronary Artery Disease Mother      Hypertension Mother      Hypertension Father          Current Outpatient Medications   Medication Sig Dispense Refill     Multiple Vitamins-Minerals (MULTIVITAMIN PO)        fluticasone (FLONASE) 50 MCG/ACT spray Spray 2 sprays into right nostril 2 times daily (Patient not taking: Reported on 1/4/2019) 1 Bottle 11     hydrochlorothiazide 12.5 MG TABS tablet Take 1 tablet (12.5 mg) by mouth daily (Patient not taking: Reported on 1/4/2019) 30 tablet 1     Allergies   Allergen Reactions     Seasonal Allergies        Reviewed and updated as needed this visit by clinical staff       Reviewed and updated as needed this visit by Provider         ROS:  All other systems on a 10-point review are negative, unless otherwise noted in HPI      OBJECTIVE:     /74   Pulse 63   Temp 98  F (36.7  C) (Oral)   Wt 78.6 kg (173 lb 3.2 oz)   SpO2 98%   BMI 29.50 kg/m    Body mass index is 29.5 kg/m .  GENERAL: healthy, alert and no distress  EYES: Eyes grossly normal to inspection, PERRL and conjunctivae and sclerae normal  HENT: ear canals and TM's normal, nose and mouth without ulcers or lesions  NECK: no adenopathy, no asymmetry, masses  RESP: lungs clear to auscultation - no rales, rhonchi or wheezes  CV: occasional premature beats, normal S1 S2, no S3 or S4, grade 2/6 crescendo-decrescendo sytolic murmur heard best over the LUSB, peripheral pulses  equal and no peripheral edema  MS: no gross musculoskeletal defects noted, no edema  SKIN: no suspicious lesions or rashes  NEURO: Normal strength and tone, sensory exam grossly normal, mentation intact, cranial nerves 2-12 intact, DTR's normal and symmetric in lower extremities and gait normal   PSYCH: mentation appears normal, affect normal/bright    Diagnostic Test Results:  EKG - nsr with frequent PACs similar to previous EKG, no ischemic ST changes    ASSESSMENT/PLAN:       1. Hypertensive emergency  With known hx of htn off of meds for 2-3 months now.  Previously was in ER for hypertensive emergency in September 2018.  Presenting today with worsening headaches (no focal neurological findings on my exam today) and new-onset persistent chest/epigastric pain x 1 day (sounds more like acid reflux, but in setting of current BP, acute coronary ischemia should be ruled out).  EKG without ST changes, only with frequent PACs.  -- Recommend she go to ER for further evaluation and management - provided verbal hand off to ER provider at [facility].    Patient Instructions   I recommend you go to the ER for evaluation and treatment of hypertensive emergency.  Please make sure to follow-up in clinic once you are discharged for continued management of your blood pressure.      Emphasized importance of following up in clinic after discharge.  Emphasized chronicity of condition and need for long-term medical management.    Ambar Payne MD  [facility]

## 2019-01-04 NOTE — PATIENT INSTRUCTIONS
I recommend you go to the ER for evaluation and treatment of hypertensive emergency.  Please make sure to follow-up in clinic once you are discharged for continued management of your blood pressure.

## 2019-01-04 NOTE — TELEPHONE ENCOUNTER
LM for patient to call back.    Patient has an appointment scheduled for today for high BP, HA x 3 days.    Need to know if she checks her blood pressure at home, how high is the blood pressure?  Patient was in the ER for hypertensive emergency recently.    Per chart review, taking hydrochlorothiazide.    Harriet Alas RN  Message handled by Nurse Triage.

## 2019-01-05 NOTE — ED PROVIDER NOTES
"  History     Chief Complaint:  multiple complaints    The history is provided by the patient and a relative. A  was used (daughter).      Rajwinder Lama is a 64 year old female who presents to the emergency department today with multiple complaints. Daughter reports high blood pressure today, currently at 195/91. Patient was started on blood pressure medicine 5 months ago due to having high blood pressure. Patient is not on blood pressure medicine currently (stopped taking it 3 months ago), because the daughter did not think it was \"that serious\". She was taking Hydrochlorothiazide. Patient was seen at urgent care and sent here for further diagnosis. Daughter reports headache that is intermittent, and intermittent vision changes, blurriness. She also notes burning chest pain after eating. She denies shortness of breath.     Allergies:  Seasonal Allergies     Medications:    Flonase  Hydrochlorothiazide - not taking     Past Medical History:    High risk HPV  Hypertension   Focal lesion of iris     Past Surgical History:    Cystectomy   Gall stone     Family History:    CAD  Hypertension     Social History:  The patient was accompanied to the ED by daughter.  Smoking Status: Never  Smokeless Tobacco: Never  Alcohol Use: Yes   Marital Status:      Review of Systems   Eyes: Positive for visual disturbance (blurry).   Respiratory: Negative for shortness of breath.    Cardiovascular: Positive for chest pain.   Neurological: Positive for headaches.   All other systems reviewed and are negative.        Physical Exam     Patient Vitals for the past 24 hrs:   BP Temp Temp src Pulse Heart Rate Resp SpO2   01/04/19 2100 (!) 146/94 -- -- 63 64 -- 100 %   01/04/19 2054 -- -- -- -- 63 -- 100 %   01/04/19 2053 168/83 -- -- 66 -- -- --   01/04/19 2000 170/83 -- -- 70 70 -- 100 %   01/04/19 1945 172/76 -- -- 57 54 -- 100 %   01/04/19 1930 199/84 -- -- (!) 49 54 -- 100 %   01/04/19 1808 (!) 195/91 98  F " (36.7  C) Temporal -- 62 16 99 %      Physical Exam   Constitutional: She appears well-developed and well-nourished.   HENT:   Right Ear: External ear normal.   Left Ear: External ear normal.   Mouth/Throat: Oropharynx is clear and moist. No oropharyngeal exudate.   TM's clear bilaterally   Eyes: Conjunctivae are normal. Pupils are equal, round, and reactive to light. No scleral icterus.   Neck: Normal range of motion. Neck supple.   Cardiovascular: Normal rate, regular rhythm, normal heart sounds and intact distal pulses. Exam reveals no gallop and no friction rub.   No murmur heard.  Pulmonary/Chest: Effort normal and breath sounds normal. No respiratory distress. She has no wheezes. She has no rales.   Abdominal: Soft. Bowel sounds are normal. She exhibits no distension and no mass. There is no tenderness.   Musculoskeletal: Normal range of motion. She exhibits no edema.   Lymphadenopathy:     She has no cervical adenopathy.   Neurological: She is alert. No cranial nerve deficit.   Speech clear. 5/5 strength x 4. Gait normal. No drift.   Skin: Skin is warm and dry. No rash noted.   Psychiatric: She has a normal mood and affect.       Emergency Department Course     ECG:  Indication: Hypertension  Completed at 2004.  Read at 2011.   Sinus rhythm with premature atrial complexes  Otherwise normal ECG    Rate 72 bpm. IA interval 142. QRS duration 80. QT/QTc 420/459. P-R-T axes -11 15 36.     Imaging:  Radiology findings were communicated with the patient and family who voiced understanding of the findings.  CT Head w/o Contrast   Preliminary Result   IMPRESSION: Diffuse cerebral volume loss and cerebral white matter   changes consistent with chronic small vessel ischemic disease. No   evidence for acute intracranial pathology.            Radiation dose for this scan was reduced using automated exposure   control, adjustment of the mA and/or kV according to patient size, or   iterative reconstruction technique          XR Chest 2 Views   Preliminary Result   IMPRESSION: Clear lungs.      Report per radiology      Laboratory:  Laboratory findings were communicated with the patient and family who voiced understanding of the findings.  TSH with free T4 reflex: 1.23  CMP: 3.2 Albumin, 6.6 Protein total WNL (Creatinine 0.49(L))   Troponin (Collected 1925): <0.015  INR: 1.01   Magnesium: 2.1   CBC: AWNL (WBC 6.5, HGB 13.1, )   UA: clear, colorless urine with 8.0 pH and small Leukocyte esterase o/w WNL      Interventions:  1940: Apresoline 10 mg IV     Emergency Department Course:  Nursing notes and vitals reviewed.  1912: I performed an exam of the patient as documented above.   IV was inserted and blood was drawn for laboratory testing, results above.  The patient provided a urine sample here in the emergency department. This was sent for laboratory testing, findings above.  The patient was sent for a chest XR and Head CT while in the emergency department, results above.   2112: Findings and plan explained to the Patient and daughter. Patient discharged home with instructions regarding supportive care, medications, and reasons to return. The importance of close follow-up was reviewed. The patient was prescribed Prinivil and Prilosec.    I personally reviewed the laboratory and imaging results with the Patient and answered all related questions prior to discharge.     Impression & Plan    Medical Decision Making:  Rajwinder Lama is a 64 year old female who presents with hypertension, headache, and some other symptoms, which appears to be all related to her very highly elevated blood pressure. Apparently family was unaware that she does have blood pressure that is high and needs to be on hypertensive medication. Family stated that last time they were in the ER they were not given instructions that were clear to them, so I again stressed the importance that she does have a hypertension diagnosis and that she needs to be on  medication permanently until taken off by her doctor in the future. She was given a dose of Hydralazine due to initial bradycardia that was seen here. Blood pressure responded nicely and once the blood pressure came down her symptoms resolved. She does have GERD symptoms, especially with eating. It is not present when she has not had a meal. Cardiac workup is negative. I discussed that her pain may benefit from dietary changes along with omeprazole. Family was present during this discussion and voiced understanding of all of the instructions. I will put her on Lisinopril at 5 mg and Omeprazole and they already have an appointment on Tuesday with a provider. I also asked them to follow up and buy a blood pressure monitor so they can check her blood pressure at home. They are comfortable with the plan and will keep close eye on her and make sure she takes her medication. If her symptoms return or she has any other concerns to come to the ED.     Diagnosis:    ICD-10-CM    1. Hypertensive urgency I16.0    2. Gastroesophageal reflux disease, esophagitis presence not specified K21.9        Disposition:  discharged to home    Discharge Medications:     Medication List      Started    lisinopril 5 MG tablet  Commonly known as:  PRINIVIL/ZESTRIL  5 mg, Oral, DAILY     omeprazole 20 MG DR capsule  Commonly known as:  priLOSEC  20 mg, Oral, DAILY            Scribe Disclosure:  I, Asia Beck, am serving as a scribe at 7:15 PM on 1/4/2019 to document services personally performed by Melinda Trevizo MD based on my observations and the provider's statements to me.    1/4/2019   [facility] EMERGENCY DEPARTMENT       Melinda Trevizo MD  01/05/19 0026

## 2019-01-06 LAB — INTERPRETATION ECG - MUSE: NORMAL

## 2019-01-07 NOTE — PROGRESS NOTES
SUBJECTIVE:   Rajwinder Lama is a 64 year old female who presents to clinic today for the following health issues:    ED/UC Followup:    Facility:  [facility]  Date of visit: 1/4/2019  Reason for visit: Hypertensive crisis  Current Status: Patient is feeling better. She states that she is not having side effects from the lisinopril and did not take it today. Daughter is checking BP at home and states that BP is good while at home (120's over 70's/80's)     I first met pt last week when she presented to clinic with a headache and BP > 180/100 - I sent her to the ER for full work-up of hypertensive urgency and asked her to come back to see me to discuss treatment for BP.  In the ER a full work-up ruled out imminent end-organ damage and acute coronary syndrome.  She was started on a low dose of lisinopril and asked to follow-up.  Pt reports her headaches are improved.    Problem list and histories reviewed & adjusted, as indicated.  Additional history: as documented    Patient Active Problem List   Diagnosis     Cervical high risk HPV (human papillomavirus) test positive     Focal lesion of iris     Benign essential hypertension     Past Surgical History:   Procedure Laterality Date     CYSTECTOMY OVARIAN BENIGN N/A      gall stone      gallbladder remains       Social History     Tobacco Use     Smoking status: Never Smoker     Smokeless tobacco: Never Used   Substance Use Topics     Alcohol use: Yes     Comment: ethipian drink rarely     Family History   Problem Relation Age of Onset     Coronary Artery Disease Mother      Hypertension Mother      Hypertension Father          Current Outpatient Medications   Medication Sig Dispense Refill     lisinopril-hydrochlorothiazide (PRINZIDE/ZESTORETIC) 10-12.5 MG tablet Take 1 tablet by mouth daily 30 tablet 3     Multiple Vitamins-Minerals (MULTIVITAMIN PO)        omeprazole (PRILOSEC) 20 MG DR capsule Take 1 capsule (20 mg) by mouth daily 30 capsule 0      "fluticasone (FLONASE) 50 MCG/ACT spray Spray 2 sprays into right nostril 2 times daily (Patient not taking: Reported on 1/4/2019) 1 Bottle 11     Allergies   Allergen Reactions     Seasonal Allergies        Reviewed and updated as needed this visit by clinical staff       Reviewed and updated as needed this visit by Provider         ROS:  Constitutional, HEENT, cardiovascular, pulmonary, gi and gu systems are negative, except as otherwise noted.    OBJECTIVE:     /78   Pulse 55   Temp 97.5  F (36.4  C) (Oral)   Ht 1.632 m (5' 4.25\")   Wt 78.7 kg (173 lb 6.4 oz)   SpO2 98%   BMI 29.53 kg/m    Body mass index is 29.53 kg/m .  GENERAL: healthy, alert and no distress  RESP: lungs clear to auscultation - no rales, rhonchi or wheezes  CV: regular rate and rhythm, normal S1 S2, no S3 or S4, no murmur, click or rub, no peripheral edema and peripheral pulses strong  MS: no gross musculoskeletal defects noted, no edema  SKIN: no suspicious lesions or rashes  NEURO: Normal strength and tone, mentation intact and speech normal    Diagnostic Test Results:  none     ASSESSMENT/PLAN:       1. Benign essential hypertension  Not at goal today, however was initiated on a low dose of lisinopril.  Will start with 2-drug regimen given > stage 2 HTN.  BPs are being checked at local drug store and apparently normal, however I am not sure these are well calibrated as pt only started lisinopril 5 mg a few days ago and unlikely to have dropped her BP so quickly.  Discussed side effects to monitor for.  - f/u in 2 weeks to recheck BP and for annual physical exam  - lisinopril-hydrochlorothiazide (PRINZIDE/ZESTORETIC) 10-12.5 MG tablet; Take 1 tablet by mouth daily  Dispense: 30 tablet; Refill: 3    2. Lipid screening  - Lipid panel reflex to direct LDL Fasting    Patient Instructions   Will increase the dose of your blood pressure medication (prescribed)  Follow-up in 2 weeks for physical  Will discuss colon cancer screening and " pap smear      Ambar Payne MD  [facility]

## 2019-01-08 ENCOUNTER — OFFICE VISIT (OUTPATIENT)
Dept: PEDIATRICS | Facility: CLINIC | Age: 64
End: 2019-01-08

## 2019-01-08 VITALS
DIASTOLIC BLOOD PRESSURE: 82 MMHG | WEIGHT: 173.4 LBS | TEMPERATURE: 97.5 F | HEIGHT: 64 IN | HEART RATE: 55 BPM | SYSTOLIC BLOOD PRESSURE: 158 MMHG | OXYGEN SATURATION: 98 % | BODY MASS INDEX: 29.6 KG/M2

## 2019-01-08 DIAGNOSIS — Z13.220 LIPID SCREENING: ICD-10-CM

## 2019-01-08 DIAGNOSIS — I10 BENIGN ESSENTIAL HYPERTENSION: Primary | ICD-10-CM

## 2019-01-08 PROCEDURE — 36415 COLL VENOUS BLD VENIPUNCTURE: CPT | Performed by: INTERNAL MEDICINE

## 2019-01-08 PROCEDURE — 80061 LIPID PANEL: CPT | Performed by: INTERNAL MEDICINE

## 2019-01-08 PROCEDURE — 99214 OFFICE O/P EST MOD 30 MIN: CPT | Performed by: INTERNAL MEDICINE

## 2019-01-08 RX ORDER — LISINOPRIL/HYDROCHLOROTHIAZIDE 10-12.5 MG
1 TABLET ORAL DAILY
Qty: 30 TABLET | Refills: 3 | Status: SHIPPED | OUTPATIENT
Start: 2019-01-08 | End: 2019-02-05

## 2019-01-08 ASSESSMENT — MIFFLIN-ST. JEOR: SCORE: 1320.51

## 2019-01-08 NOTE — PATIENT INSTRUCTIONS
Will increase the dose of your blood pressure medication (prescribed)  Follow-up in 2 weeks for physical  Will discuss colon cancer screening and pap smear

## 2019-01-09 LAB
CHOLEST SERPL-MCNC: 146 MG/DL
HDLC SERPL-MCNC: 55 MG/DL
LDLC SERPL CALC-MCNC: 80 MG/DL
NONHDLC SERPL-MCNC: 91 MG/DL
TRIGL SERPL-MCNC: 57 MG/DL

## 2019-02-01 ENCOUNTER — TELEPHONE (OUTPATIENT)
Dept: PEDIATRICS | Facility: CLINIC | Age: 64
End: 2019-02-01

## 2019-02-01 NOTE — TELEPHONE ENCOUNTER
Panel Management Review      Patient has the following on her problem list:     Hypertension   Last three blood pressure readings:  BP Readings from Last 3 Encounters:   01/08/19 158/82   01/04/19 (!) 146/94   01/04/19 198/90     Blood pressure: FAILED    HTN Guidelines:  Age 18-59 BP range:  Less than 140/90  Age 60-85 with Diabetes:  Less than 140/90  Age 60-85 without Diabetes:  less than 150/90      Composite cancer screening  Chart review shows that this patient is due/due soon for the following Colonoscopy  Summary:    Patient is due/failing the following:   Dexa, COLONOSCOPY and PAP    Action needed:   Patient needs office visit for Physical.    Type of outreach:    Sent [employer] message.    Questions for provider review:    None                                                                                                                                    Norma Melissa MA 10:05 AM 2/1/2019      Chart routed to self .

## 2019-02-05 ENCOUNTER — OFFICE VISIT (OUTPATIENT)
Dept: PEDIATRICS | Facility: CLINIC | Age: 64
End: 2019-02-05
Payer: COMMERCIAL

## 2019-02-05 VITALS
TEMPERATURE: 97.3 F | OXYGEN SATURATION: 99 % | BODY MASS INDEX: 29.24 KG/M2 | WEIGHT: 171.3 LBS | DIASTOLIC BLOOD PRESSURE: 68 MMHG | HEART RATE: 58 BPM | SYSTOLIC BLOOD PRESSURE: 148 MMHG | HEIGHT: 64 IN

## 2019-02-05 DIAGNOSIS — Z78.0 ASYMPTOMATIC POSTMENOPAUSAL STATUS: ICD-10-CM

## 2019-02-05 DIAGNOSIS — I10 BENIGN ESSENTIAL HYPERTENSION: ICD-10-CM

## 2019-02-05 DIAGNOSIS — Z12.11 SCREEN FOR COLON CANCER: ICD-10-CM

## 2019-02-05 DIAGNOSIS — Z00.00 ROUTINE GENERAL MEDICAL EXAMINATION AT A HEALTH CARE FACILITY: Primary | ICD-10-CM

## 2019-02-05 DIAGNOSIS — Z12.4 SCREENING FOR MALIGNANT NEOPLASM OF CERVIX: ICD-10-CM

## 2019-02-05 PROCEDURE — 99397 PER PM REEVAL EST PAT 65+ YR: CPT | Performed by: INTERNAL MEDICINE

## 2019-02-05 PROCEDURE — G0476 HPV COMBO ASSAY CA SCREEN: HCPCS | Performed by: INTERNAL MEDICINE

## 2019-02-05 PROCEDURE — G0145 SCR C/V CYTO,THINLAYER,RESCR: HCPCS | Performed by: INTERNAL MEDICINE

## 2019-02-05 RX ORDER — LISINOPRIL AND HYDROCHLOROTHIAZIDE 12.5; 2 MG/1; MG/1
1 TABLET ORAL DAILY
Qty: 30 TABLET | Refills: 3 | Status: SHIPPED | OUTPATIENT
Start: 2019-02-05 | End: 2019-02-12

## 2019-02-05 ASSESSMENT — ENCOUNTER SYMPTOMS
SORE THROAT: 0
HEARTBURN: 1
NERVOUS/ANXIOUS: 1
CONSTIPATION: 1
DIZZINESS: 0
ARTHRALGIAS: 0
FREQUENCY: 0
HEMATURIA: 0
PARESTHESIAS: 0
WEAKNESS: 0
DIARRHEA: 0
COUGH: 0
SHORTNESS OF BREATH: 0
CHILLS: 1
EYE PAIN: 0
HEMATOCHEZIA: 0
JOINT SWELLING: 0
FEVER: 0
ABDOMINAL PAIN: 0
NAUSEA: 0
PALPITATIONS: 0
HEADACHES: 0
BREAST MASS: 0
DYSURIA: 0
MYALGIAS: 0

## 2019-02-05 ASSESSMENT — MIFFLIN-ST. JEOR: SCORE: 1310.98

## 2019-02-05 ASSESSMENT — ACTIVITIES OF DAILY LIVING (ADL): CURRENT_FUNCTION: NO ASSISTANCE NEEDED

## 2019-02-05 NOTE — LETTER
April 5, 2019      Rajwinder Lama  [address]  [place] [address]    Dear ,      We are contacting you by certified letter regarding the Pap smear and Human Papillomavirus (HPV) test you had done recently to ensure you have received these results and recommendations.    Your Pap smear result is normal, but your HPV test is positive for a high-risk strain. HPV is a common virus found in sexually active men and women. Some high-risk strains of HPV can be risk factors for later development of cervical cancer.    About 80 percent of women have been exposed to HPV throughout their lifetime. There is no medication for the treatment of HPV. Typically your own immune system gets rid of the virus before it does harm. HPV is spread by direct skin-to-skin contact, including sexual intercourse, oral sex, anal sex, or any other contact involving the genital area (example: hand to genital contact). It is not possible to become infected with HPV by touching an object, such as a toilet seat. Most people who are infected with HPV have no signs or symptoms.    Your doctor has recommended that you have specific test called Colposcopy. This test allows the provider to closely examine your cervix. If biopsies are taken, the results will be complete within two weeks and will be used to determine the plan for future follow-up.      Please call [facility] at [phone] to schedule this procedure with Dr. Oakes. Schedule this for a time when you are not due to have a period (if having regular menstrual bleeding). You can take an over the counter pain reliever 1 hour before your colposcopy. Nothing in the vagina for 24 hours before your colposcopy (no sex, douches, vaginal medications or lubricants).     If you have additional questions regarding this result, please call our registered nurse, Jennifer at [phone].    Sincerely,    Ambar Payne MD/[facility]

## 2019-02-05 NOTE — PROGRESS NOTES
"SUBJECTIVE:   Rajwinder Lama is a 64 year old female who presents for Preventive Visit.  Are you in the first 12 months of your Medicare coverage?  No    Health Maintenance:    Colonoscopy  Done? No    Pap Smear  Done? Yes, was HPV positive on 8/1/2017 with [facility]    Mammogram  Done? Yes              Annual Wellness Visit     In general, how would you rate your overall health?  Good    Frequency of exercise:  2-3 days/week    Duration of exercise:  Less than 15 minutes    Do you usually eat at least 4 servings of fruit and vegetables a day, include whole grains    & fiber and avoid regularly eating high fat or \"junk\" foods?  Yes    Taking medications regularly:  Yes    Medication side effects:  Not applicable    Ability to successfully perform activities of daily living:  No assistance needed    Home Safety:  No safety concerns identified    Hearing Impairment:  Need to ask people to speak up or repeat themselves    In the past 6 months, have you been bothered by leaking of urine?  No    In general, how would you rate your overall mental or emotional health?  Good    PHQ-2 Total Score: 2    Additional concerns today:  No    Do you feel safe in your environment? Yes    Do you have a Health Care Directive? Yes: Patient states has Advance Directive and will bring in a copy to clinic.    Hearing test -Declines    Fall risk  Fallen 2 or more times in the past year?: No  Any fall with injury in the past year?: No    Cognitive Screening   1) Repeat 3 items (Leader, Season, Table)    2) Clock draw: NORMAL - was a little off due to language barrier  3) 3 item recall: Recalls 3 objects  Results: 3 items recalled: COGNITIVE IMPAIRMENT LESS LIKELY    Mini-CogTM Copyright JULIÁN Emerson. Licensed by the author for use in [facility]; reprinted with permission ([email or web address]@[email or web address]). All rights reserved.      Do you have sleep apnea, excessive snoring or daytime drowsiness?: no    Reviewed and updated as needed this visit by " clinical staff  Tobacco  Allergies  Meds  Med Hx  Surg Hx  Fam Hx  Soc Hx        Reviewed and updated as needed this visit by Provider        Social History     Tobacco Use     Smoking status: Never Smoker     Smokeless tobacco: Never Used   Substance Use Topics     Alcohol use: Yes     Comment: ethipian drink rarely       Alcohol Use 2/5/2019   If you drink alcohol do you typically have greater than 3 drinks per day OR greater than 7 drinks per week? Not Applicable   No flowsheet data found.    Hypertension Follow-up      Outpatient blood pressures are not being checked.    Low Salt Diet: not monitoring salt      Current providers sharing in care for this patient include:   Patient Care Team:  Charo Saenz MD as PCP - General (Internal Medicine)  Bismark Payne Mai, MD as PCP - Assigned PCP    The following health maintenance items are reviewed in Epic and correct as of today:  Health Maintenance   Topic Date Due     HIV SCREEN (SYSTEM ASSIGNED)  01/01/1972     COLON CANCER SCREEN (SYSTEM ASSIGNED)  01/01/2004     ZOSTER IMMUNIZATION (1 of 2) 01/01/2004     DEXA SCAN SCREENING (SYSTEM ASSIGNED)  01/01/2019     HPV Q1 Year  02/14/2019     PAP Q1 YR DIAGNOSTIC  02/14/2019     MAMMO SCREEN Q2 YR (SYSTEM ASSIGNED)  08/08/2019     PHQ-2 Q1 YR  09/13/2019     BMP Q1 YR  01/04/2020     FALL RISK ASSESSMENT  02/05/2020     LIPID SCREEN Q5 YR FEMALE (SYSTEM ASSIGNED)  01/08/2024     ADVANCE DIRECTIVE PLANNING Q5 YRS  01/08/2024     DTAP/TDAP/TD IMMUNIZATION (2 - Td) 06/01/2027     HEPATITIS C SCREENING  Completed     INFLUENZA VACCINE  Addressed     IPV IMMUNIZATION  Aged Out     MENINGITIS IMMUNIZATION  Aged Out     BP Readings from Last 3 Encounters:   02/05/19 148/68   01/08/19 158/82   01/04/19 (!) 146/94    Wt Readings from Last 3 Encounters:   02/05/19 77.7 kg (171 lb 4.8 oz)   01/08/19 78.7 kg (173 lb 6.4 oz)   01/04/19 78.6 kg (173 lb 3.2 oz)                  Patient Active Problem List   Diagnosis      Cervical high risk HPV (human papillomavirus) test positive     Focal lesion of iris     Benign essential hypertension     Past Surgical History:   Procedure Laterality Date     CYSTECTOMY OVARIAN BENIGN N/A      gall stone      gallbladder remains       Social History     Tobacco Use     Smoking status: Never Smoker     Smokeless tobacco: Never Used   Substance Use Topics     Alcohol use: Yes     Comment: ethipian drink rarely     Family History   Problem Relation Age of Onset     Coronary Artery Disease Mother      Hypertension Mother      Hypertension Father          Current Outpatient Medications   Medication Sig Dispense Refill     fluticasone (FLONASE) 50 MCG/ACT spray Spray 2 sprays into right nostril 2 times daily 1 Bottle 11     lisinopril-hydrochlorothiazide (PRINZIDE/ZESTORETIC) 20-12.5 MG tablet Take 1 tablet by mouth daily 30 tablet 3     Multiple Vitamins-Minerals (MULTIVITAMIN PO)        Allergies   Allergen Reactions     Seasonal Allergies      Recent Labs   Lab Test 01/08/19  1250 01/04/19  1925 09/27/18  0922  08/01/17  1107   LDL 80  --   --   --  85   HDL 55  --   --   --  58   TRIG 57  --   --   --  87   ALT  --  25  --   --  21   CR  --  0.49* 0.57   < > 0.58   GFRESTIMATED  --  >90 >90   < > >90  Non  GFR Calc     GFRESTBLACK  --  >90 >90   < > >90  African American GFR Calc     POTASSIUM  --  3.5 3.6   < > 3.8   TSH  --  1.23  --   --   --     < > = values in this interval not displayed.      Last 3 Pap and HPV Results:   PAP / HPV Latest Ref Rng & Units 8/1/2017   PAP - NIL   HPV 16 DNA NEG Positive(A)   HPV 18 DNA NEG Negative   OTHER HR HPV NEG Negative       Review of Systems   Constitutional: Positive for chills. Negative for fever.   HENT: Negative for congestion, ear pain, hearing loss and sore throat.    Eyes: Negative for pain and visual disturbance.   Respiratory: Negative for cough and shortness of breath.    Cardiovascular: Negative for chest pain, palpitations and  "peripheral edema.   Gastrointestinal: Positive for constipation and heartburn. Negative for abdominal pain, diarrhea, hematochezia and nausea.   Breasts:  Negative for tenderness, breast mass and discharge.   Genitourinary: Negative for dysuria, frequency, genital sores, hematuria, urgency, vaginal bleeding and vaginal discharge.   Musculoskeletal: Negative for arthralgias, joint swelling and myalgias.   Skin: Negative for rash.   Neurological: Negative for dizziness, weakness, headaches and paresthesias.   Psychiatric/Behavioral: Negative for mood changes. The patient is nervous/anxious.          OBJECTIVE:   /68   Pulse 58   Temp 97.3  F (36.3  C) (Tympanic)   Ht 1.632 m (5' 4.25\")   Wt 77.7 kg (171 lb 4.8 oz)   SpO2 99%   BMI 29.18 kg/m   Estimated body mass index is 29.18 kg/m  as calculated from the following:    Height as of this encounter: 1.632 m (5' 4.25\").    Weight as of this encounter: 77.7 kg (171 lb 4.8 oz).  Physical Exam  GENERAL: healthy, alert and no distress  EYES: Eyes grossly normal to inspection, PERRL and conjunctivae and sclerae normal  HENT: ear canals and TM's normal, nose and mouth without ulcers or lesions  NECK: no adenopathy, no asymmetry, masses, or scars and thyroid normal to palpation  RESP: lungs clear to auscultation - no rales, rhonchi or wheezes  CV: regular rate and rhythm, normal S1 S2, no S3 or S4, no murmur, click or rub, no peripheral edema and peripheral pulses strong  ABDOMEN: soft, nontender, no hepatosplenomegaly, no masses and bowel sounds normal   (female): normal female external genitalia, normal urethral meatus, vaginal mucosa pink, moist, well rugated, and normal cervix/adnexa/uterus without masses or discharge  MS: no gross musculoskeletal defects noted, no edema  SKIN: no suspicious lesions or rashes  NEURO: Normal strength and tone, mentation intact and speech normal  PSYCH: mentation appears normal, affect normal/bright    Diagnostic Test " "Results:  none     ASSESSMENT / PLAN:   1. Routine general medical examination at a health care facility  65 yo female with htn here for annual preventive health physical.  Reviewed healthy BP and BMI ranges.  Counseled on lifestyle modifications for optimal mental and physical health.  Discussed age-appropriate health maintanence.  Additional concerns addressed.  - DEXA HIP/PELVIS/SPINE - Future; Future  - MA Screen Bilateral w/Kwadwo; Future    2. Benign essential hypertension  Not at goal - increased lisinopril to 20 mg and will recheck in 1 week.  If still elevated with double dose and recheck.  Otherwise will order 90 tabs x 3 refills.  - lisinopril-hydrochlorothiazide (PRINZIDE/ZESTORETIC) 20-12.5 MG tablet; Take 1 tablet by mouth daily  Dispense: 30 tablet; Refill: 3    3. Screening for malignant neoplasm of cervix  - Pap imaged thin layer screen with HPV - recommended age 30 - 65 years (select HPV order below)  - HPV High Risk Types DNA Cervical    4. Screen for colon cancer  Salix guard form faxed.     5. Asymptomatic postmenopausal status  - DEXA HIP/PELVIS/SPINE - Future; Future    Increased dose of lisinopril - recheck 1 week, if elevated, will double dose.    End of Life Planning:  Patient currently has an advanced directive: yes, pt to bring advanced directive.    COUNSELING:  Reviewed preventive health counseling, as reflected in patient instructions    BP Readings from Last 1 Encounters:   02/05/19 148/68     Estimated body mass index is 29.18 kg/m  as calculated from the following:    Height as of this encounter: 1.632 m (5' 4.25\").    Weight as of this encounter: 77.7 kg (171 lb 4.8 oz).           reports that  has never smoked. she has never used smokeless tobacco.      Appropriate preventive services were discussed with this patient, including applicable screening as appropriate for cardiovascular disease, diabetes, osteopenia/osteoporosis, and glaucoma.  As appropriate for age/gender, discussed " screening for colorectal cancer, prostate cancer, breast cancer, and cervical cancer. Checklist reviewing preventive services available has been given to the patient.    Reviewed patients plan of care and provided an AVS. The Basic Care Plan (routine screening as documented in Health Maintenance) for Rajwinder meets the Care Plan requirement. This Care Plan has been established and reviewed with the Patient and caregiver.    Counseling Resources:  ATP IV Guidelines  Pooled Cohorts Equation Calculator  Breast Cancer Risk Calculator  FRAX Risk Assessment  ICSI Preventive Guidelines  Dietary Guidelines for Americans, 2010  USDA's MyPlate  ASA Prophylaxis  Lung CA Screening    Ambar Payne MD  [facility]

## 2019-02-05 NOTE — LETTER
February 21, 2019      Rajwinder Lama  [address]  [place] [address]    Dear ,      We are contacting you in writing because we have been unable to reach you by phone.    This letter is regarding the Pap smear and Human Papillomavirus (HPV) test you had done recently. Your Pap smear result is normal, but your HPV test is positive for a high- risk strain. HPV is a common virus found in sexually active men and women. Some high-risk strains of HPV can be risk factors for later development of cervical cancer.    About 80 percent of women have been exposed to HPV throughout their lifetime. There is no medication for the treatment of HPV. Typically your own immune system gets rid of the virus before it does harm. HPV is spread by direct skin-to-skin contact, including sexual intercourse, oral sex, anal sex, or any other contact involving the genital area (example: hand to genital contact). It is not possible to become infected with HPV by touching an object, such as a toilet seat. Most people who are infected with HPV have no signs or symptoms.    Your doctor has recommended that you have specific test called Colposcopy. This test allows the provider to closely examine your cervix. If biopsies are taken, the results will be complete within two weeks and will be used to determine the plan for future follow-up.      Please call [facility] at [phone] to schedule this procedure with Dr. Oakes. Schedule this for a time when you are not due to have a period (if having regular menstrual bleeding). You can take an over the counter pain reliever 1 hour before your colposcopy. Nothing in the vagina for 24 hours before your colposcopy (no sex, douches, vaginal medications or lubricants).     If you have additional questions regarding this result, please call our registered nurse, Jennifer at [phone].    Sincerely,    Ambar Payne MD/Lynette Nissen RN

## 2019-02-07 LAB
COPATH REPORT: NORMAL
PAP: NORMAL

## 2019-02-08 LAB
FINAL DIAGNOSIS: ABNORMAL
HPV HR 12 DNA CVX QL NAA+PROBE: NEGATIVE
HPV16 DNA SPEC QL NAA+PROBE: POSITIVE
HPV18 DNA SPEC QL NAA+PROBE: NEGATIVE
SPECIMEN DESCRIPTION: ABNORMAL
SPECIMEN SOURCE CVX/VAG CYTO: ABNORMAL

## 2019-02-12 ENCOUNTER — ANCILLARY PROCEDURE (OUTPATIENT)
Dept: BONE DENSITY | Facility: CLINIC | Age: 64
End: 2019-02-12
Attending: INTERNAL MEDICINE
Payer: COMMERCIAL

## 2019-02-12 ENCOUNTER — ALLIED HEALTH/NURSE VISIT (OUTPATIENT)
Dept: NURSING | Facility: CLINIC | Age: 64
End: 2019-02-12
Payer: COMMERCIAL

## 2019-02-12 VITALS — HEART RATE: 66 BPM | DIASTOLIC BLOOD PRESSURE: 68 MMHG | OXYGEN SATURATION: 97 % | SYSTOLIC BLOOD PRESSURE: 130 MMHG

## 2019-02-12 DIAGNOSIS — Z78.0 ASYMPTOMATIC POSTMENOPAUSAL STATUS: ICD-10-CM

## 2019-02-12 DIAGNOSIS — Z00.00 ROUTINE GENERAL MEDICAL EXAMINATION AT A HEALTH CARE FACILITY: ICD-10-CM

## 2019-02-12 DIAGNOSIS — I10 BENIGN ESSENTIAL HYPERTENSION: Primary | ICD-10-CM

## 2019-02-12 PROCEDURE — 77085 DXA BONE DENSITY AXL VRT FX: CPT | Performed by: FAMILY MEDICINE

## 2019-02-12 PROCEDURE — 99207 ZZC NO CHARGE NURSE ONLY: CPT

## 2019-02-12 RX ORDER — LISINOPRIL AND HYDROCHLOROTHIAZIDE 12.5; 2 MG/1; MG/1
1 TABLET ORAL DAILY
Qty: 90 TABLET | Refills: 3 | Status: SHIPPED | OUTPATIENT
Start: 2019-02-12 | End: 2020-04-09

## 2019-02-12 NOTE — PROGRESS NOTES
Rajwinder Lama is a 64 year old patient who comes in today for a Blood Pressure check.  Initial BP:  /68 (BP Location: Right arm, Patient Position: Sitting, Cuff Size: Adult Large)   Pulse 66   SpO2 97%      Data Unavailable  Disposition: results routed to provider  Angela Mendoza LPN

## 2019-02-27 ENCOUNTER — TRANSFERRED RECORDS (OUTPATIENT)
Dept: HEALTH INFORMATION MANAGEMENT | Facility: CLINIC | Age: 64
End: 2019-02-27

## 2019-02-27 LAB — COLOGUARD-ABSTRACT: NEGATIVE

## 2019-08-21 ENCOUNTER — OFFICE VISIT (OUTPATIENT)
Dept: OBGYN | Facility: CLINIC | Age: 64
End: 2019-08-21
Payer: COMMERCIAL

## 2019-08-21 VITALS
HEIGHT: 64 IN | WEIGHT: 169 LBS | DIASTOLIC BLOOD PRESSURE: 82 MMHG | SYSTOLIC BLOOD PRESSURE: 148 MMHG | BODY MASS INDEX: 28.85 KG/M2

## 2019-08-21 DIAGNOSIS — Z11.3 SCREEN FOR STD (SEXUALLY TRANSMITTED DISEASE): ICD-10-CM

## 2019-08-21 DIAGNOSIS — R87.810 CERVICAL HIGH RISK HPV (HUMAN PAPILLOMAVIRUS) TEST POSITIVE: Primary | ICD-10-CM

## 2019-08-21 PROCEDURE — 87491 CHLMYD TRACH DNA AMP PROBE: CPT | Performed by: OBSTETRICS & GYNECOLOGY

## 2019-08-21 PROCEDURE — 88342 IMHCHEM/IMCYTCHM 1ST ANTB: CPT | Performed by: OBSTETRICS & GYNECOLOGY

## 2019-08-21 PROCEDURE — 88305 TISSUE EXAM BY PATHOLOGIST: CPT | Performed by: OBSTETRICS & GYNECOLOGY

## 2019-08-21 PROCEDURE — 87591 N.GONORRHOEAE DNA AMP PROB: CPT | Performed by: OBSTETRICS & GYNECOLOGY

## 2019-08-21 PROCEDURE — 57454 BX/CURETT OF CERVIX W/SCOPE: CPT | Performed by: OBSTETRICS & GYNECOLOGY

## 2019-08-21 ASSESSMENT — MIFFLIN-ST. JEOR: SCORE: 1300.55

## 2019-08-21 NOTE — PROGRESS NOTES
Colposcopy Note    I communicated with Pt via [employer]  because Pt speaks no/limited English.    Past History:     No LMP recorded. Patient is postmenopausal.  Patient is not pregnant.     Previous Abnormal Pap Hx:  Abnormal Pap dated:  Pap-WNL, Pos HPV 16  Prior Colposcopy dated: 2017 - ZO 2-3 on EctoCx, and ECC  Prior Treatment dated: 2018 - LEEP Neg ZO    Indications:  Encounter Diagnosis   Name Primary?     Cervical high risk HPV (human papillomavirus) test positive Yes       PROCEDURE:  The procedure was explained, and informed consent obtained. The patient was placed in the dorsal lithotomy position. A vaginal speculum was placed. The cervix is markedly attenuated from her prior LEEP procedure, and the cervical os is very stenotic.  A GC/Chlamydia culture was obtained. Dilute acetic acid was applied to cervix. The exocervix was visualized. The transformation zone was visualized satisfactorily at the entrance to the external os. Colposcopy was done with white light and with the [place] light. Endocervical curettage was done with a cytobrush which of which was inserted partially into the os because it is too stenotic to insert a Kevorkian, and a tenaculum was used to stabilize the cervix during this as well as the ectocervical biopsy. Biopsy done at the 4 o clock position(s) Colposcopy of vagina revealed: normal. The patient tolerated the procedure well. At the completion of the procedure, only scant bleeding was present. Hemostasis was achieved with Monsel's solution.    FINDINGS:  White epithelium @ 4 o clock position(s).  Punctation: absent  Mosaicism: absent    Physical Exam   Genitourinary:           ASSESSMENT:  Encounter Diagnosis   Name Primary?     Cervical high risk HPV (human papillomavirus) test positive Yes       PLAN:  If Bx shows ZO 1 or less, follow-up with Ob/Gyn per ASCCP Guideline in 1 year for Pap & HPV cotest at next annual exam.     Procedure  Planned:   If HGSIL, consider Laser vaporization.  Pt may also consider Robotic TLH/BSO due to recurrence of HGSIL, although it may be difficult to place a V-Care due to stenosis of os.  Could consider a LAVH/BSO instead.      Shaw Trevizo MD

## 2019-08-21 NOTE — NURSING NOTE
"Chief Complaint   Patient presents with     Colposcopy     +HPV 16       Initial BP (!) 148/82 (BP Location: Right arm, Patient Position: Sitting, Cuff Size: Adult Large)   Ht 1.632 m (5' 4.25\")   Wt 76.7 kg (169 lb)   BMI 28.78 kg/m   Estimated body mass index is 28.78 kg/m  as calculated from the following:    Height as of this encounter: 1.632 m (5' 4.25\").    Weight as of this encounter: 76.7 kg (169 lb).  BP completed using cuff size: large    Questioned patient about current smoking habits.  Pt. has never smoked.          The following HM Due: NONE    Manuel Jameson CMA                "

## 2019-08-23 LAB
C TRACH DNA SPEC QL NAA+PROBE: NEGATIVE
N GONORRHOEA DNA SPEC QL NAA+PROBE: NEGATIVE
SPECIMEN SOURCE: NORMAL
SPECIMEN SOURCE: NORMAL

## 2019-08-26 LAB — COPATH REPORT: NORMAL

## 2019-08-26 NOTE — RESULT ENCOUNTER NOTE
Please advise patient her colposcopic biopsies from her cervix showed no dysplasia nor cervical cancer.  She does not need anything else done at this time.  She only needs to follow up in 1 year for her annual exam, and at that time a Pap with HPV DNA test will be done again from her cervix to check to see if the cervix cells are normal and the HPV virus has been eradicated.  If so she will resume routine screening 3 years later.  She can let me know if she has any other questions.    Shaw Trevizo MD

## 2019-09-25 ENCOUNTER — APPOINTMENT (OUTPATIENT)
Dept: OPTOMETRY | Facility: CLINIC | Age: 64
End: 2019-09-25
Payer: COMMERCIAL

## 2019-09-25 ENCOUNTER — OFFICE VISIT (OUTPATIENT)
Dept: OPTOMETRY | Facility: CLINIC | Age: 64
End: 2019-09-25
Payer: COMMERCIAL

## 2019-09-25 DIAGNOSIS — H52.03 HYPERMETROPIA OF BOTH EYES: Primary | ICD-10-CM

## 2019-09-25 DIAGNOSIS — H26.9 BILATERAL INCIPIENT CATARACTS: ICD-10-CM

## 2019-09-25 DIAGNOSIS — H52.4 PRESBYOPIA: ICD-10-CM

## 2019-09-25 DIAGNOSIS — D31.31 CHOROIDAL NEVUS OF RIGHT EYE: ICD-10-CM

## 2019-09-25 DIAGNOSIS — H21.302 CYST OF IRIS OF LEFT EYE: ICD-10-CM

## 2019-09-25 PROCEDURE — 92015 DETERMINE REFRACTIVE STATE: CPT | Performed by: OPTOMETRIST

## 2019-09-25 PROCEDURE — 92014 COMPRE OPH EXAM EST PT 1/>: CPT | Performed by: OPTOMETRIST

## 2019-09-25 PROCEDURE — 92341 FIT SPECTACLES BIFOCAL: CPT | Performed by: OPTOMETRIST

## 2019-09-25 ASSESSMENT — REFRACTION_MANIFEST
OD_CYLINDER: +0.75
OS_SPHERE: +3.50
OS_AXIS: 014
OD_AXIS: 172
METHOD_AUTOREFRACTION: 1
OD_SPHERE: +2.00
OD_CYLINDER: SPHERE
OS_SPHERE: +3.25
OS_AXIS: 030
OD_SPHERE: +1.25
OS_CYLINDER: +0.50
OS_CYLINDER: +1.00

## 2019-09-25 ASSESSMENT — VISUAL ACUITY
OD_CC: 20/30-1
OS_CC+: -2
OD_SC: 20/40
OS_SC: 20/125
OS_CC: 20/40
OD_CC+: -2
OD_SC+: -1
METHOD: SNELLEN - LINEAR
CORRECTION_TYPE: GLASSES
OD_CC: 20/20
OS_CC: 20/80

## 2019-09-25 ASSESSMENT — REFRACTION_WEARINGRX
OD_SPHERE: +1.25
OS_ADD: +2.50
OD_ADD: +2.50
OS_AXIS: 005
OD_CYLINDER: SPHERE
OS_CYLINDER: +0.50
OS_SPHERE: +3.00
SPECS_TYPE: BIFOCAL

## 2019-09-25 ASSESSMENT — CUP TO DISC RATIO
OS_RATIO: 0.5
OD_RATIO: 0.5

## 2019-09-25 ASSESSMENT — CONF VISUAL FIELD
OS_NORMAL: 1
OD_NORMAL: 1

## 2019-09-25 ASSESSMENT — SLIT LAMP EXAM - LIDS
COMMENTS: NORMAL
COMMENTS: NORMAL

## 2019-09-25 ASSESSMENT — EXTERNAL EXAM - RIGHT EYE: OD_EXAM: NORMAL

## 2019-09-25 ASSESSMENT — TONOMETRY
OS_IOP_MMHG: 18
OD_IOP_MMHG: 17
IOP_METHOD: APPLANATION

## 2019-09-25 ASSESSMENT — EXTERNAL EXAM - LEFT EYE: OS_EXAM: NORMAL

## 2019-09-25 NOTE — PROGRESS NOTES
Chief Complaint   Patient presents with     Annual Eye Exam      Accompanied by  and Accompanied by daughter  Last Eye Exam: 8-8-2017  Dilated Previously: Yes    What are you currently using to see?  glasses  Full time     Distance Vision Acuity: Satisfied with vision    Near Vision Acuity: Satisfied with vision while reading  with glasses    Eye Comfort: good  Do you use eye drops? : No  Occupation or Hobbies: none  House work    Karla Ross Optometric Assistant, A.B.O[employer]          Medical, surgical and family histories reviewed and updated 9/25/2019.       OBJECTIVE: See Ophthalmology exam    ASSESSMENT:    ICD-10-CM    1. 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 Presbyopia H52.4    3. Bilateral incipient cataracts H26.9 EYE EXAM (SIMPLE-NONBILLABLE)     REFRACTION   4. 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

## 2019-09-25 NOTE — LETTER
9/25/2019         RE: Rajwinder Lama  [address]  [place] [address]        Dear Colleague,    Thank you for referring your patient, Rajwinder Lama, to the [facility] [place]. Please see a copy of my visit note below.    Chief Complaint   Patient presents with     Annual Eye Exam      Accompanied by  and Accompanied by daughter  Last Eye Exam: 8-8-2017  Dilated Previously: Yes    What are you currently using to see?  glasses  Full time     Distance Vision Acuity: Satisfied with vision    Near Vision Acuity: Satisfied with vision while reading  with glasses    Eye Comfort: good  Do you use eye drops? : No  Occupation or Hobbies: none  House work    Karla Ross Optometric Assistant, A.B.O.C.          Medical, surgical and family histories reviewed and updated 9/25/2019.       OBJECTIVE: See Ophthalmology exam    ASSESSMENT:    ICD-10-CM    1. 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 Presbyopia H52.4    3. Bilateral incipient cataracts H26.9 EYE EXAM (SIMPLE-NONBILLABLE)     REFRACTION   4. 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        Sincerely,        Rin Jameson, OD

## 2020-02-04 DIAGNOSIS — J34.3 NASAL TURBINATE HYPERTROPHY: ICD-10-CM

## 2020-02-04 NOTE — TELEPHONE ENCOUNTER
"Requested Prescriptions   Pending Prescriptions Disp Refills     fluticasone (FLONASE) 50 MCG/ACT nasal spray [Pharmacy Med Name: FLUTICASONE PROP 50 MCG SPRAY] 16 mL 11     Sig: SPRAY 2 SPRAYS INTO RIGHT NOSTRIL 2 TIMES DAILY       Inhaled Steroids Protocol Failed - 2/4/2020  2:26 PM        Failed - Medication is active on med list        Passed - Patient is age 12 or older        Passed - Recent (12 mo) or future (30 days) visit within the authorizing provider's specialty     Patient has had an office visit with the authorizing provider or a provider within the authorizing providers department within the previous 12 mos or has a future within next 30 days. See \"Patient Info\" tab in inbasket, or \"Choose Columns\" in Meds & Orders section of the refill encounter.                "

## 2020-02-05 RX ORDER — FLUTICASONE PROPIONATE 50 MCG
2 SPRAY, SUSPENSION (ML) NASAL 2 TIMES DAILY
Qty: 16 ML | Refills: 11 | Status: SHIPPED | OUTPATIENT
Start: 2020-02-05 | End: 2020-08-25

## 2020-03-11 ENCOUNTER — HEALTH MAINTENANCE LETTER (OUTPATIENT)
Age: 65
End: 2020-03-11

## 2020-04-08 DIAGNOSIS — I10 BENIGN ESSENTIAL HYPERTENSION: ICD-10-CM

## 2020-04-08 NOTE — TELEPHONE ENCOUNTER
Unable to refill under nursing protocol.     Zestoretic 20-12.5 mg one tablet daily  Last Written Prescription Date:  2/12/19  Last Fill Quantity: 90,  # refills: 3   Last office visit: 2/5/2019 with prescribing provider:     Future Office Visit:      BP Readings from Last 4 Encounters:   08/21/19 (!) 148/82   02/12/19 130/68   02/05/19 148/68   01/08/19 158/82     Julieth, RN  Triage Nurse

## 2020-04-09 RX ORDER — LISINOPRIL AND HYDROCHLOROTHIAZIDE 12.5; 2 MG/1; MG/1
1 TABLET ORAL DAILY
Qty: 90 TABLET | Refills: 0 | Status: SHIPPED | OUTPATIENT
Start: 2020-04-09 | End: 2020-07-09

## 2020-04-09 NOTE — TELEPHONE ENCOUNTER
Pt is overdue for annual visit - I am okay with refilling meds for 3 months, but please call to schedule annual appt prior to next refill.    Ambar Payne MD

## 2020-07-09 DIAGNOSIS — I10 BENIGN ESSENTIAL HYPERTENSION: ICD-10-CM

## 2020-07-09 RX ORDER — LISINOPRIL AND HYDROCHLOROTHIAZIDE 12.5; 2 MG/1; MG/1
1 TABLET ORAL DAILY
Qty: 90 TABLET | Refills: 0 | Status: SHIPPED | OUTPATIENT
Start: 2020-07-09 | End: 2020-08-25

## 2020-07-09 NOTE — TELEPHONE ENCOUNTER
Filled 3 months.     Please call.  Needs appointment for blood pressure recheck before any further refills       Ben Francis MD  Internal Medicine and Pediatrics

## 2020-07-09 NOTE — TELEPHONE ENCOUNTER
Routing refill request to provider for review/approval because:  Labs not current:  CR, K, NA  Elevated BP    Janet Arzola RN on 7/9/2020 at 10:00 AM

## 2020-07-23 ENCOUNTER — TELEPHONE (OUTPATIENT)
Dept: PEDIATRICS | Facility: CLINIC | Age: 65
End: 2020-07-23

## 2020-07-23 NOTE — LETTER
[facility]  [address]  [address]  [place] [address]  Phone: [phone]  Fax: [phone]    07/23/20    Rajwinder Lmaa  [address]  [place] [address]      Dear Rajwinder,    You have an upcoming appointment with Dr. Ambar Payne for a wellness visit. Unfortunately due to COVID there have been changes in doctors schedules and Dr. Payne will not be working in the clinic that week.    Your appointment has been rescheduled to Tuesday, August 25 with check in time at 9:45am.    Additionally, due to COVID we are unable to have in-person interpreters an a telephone  will be used during your visit.    I apologize for any inconvenience this may cause. If you need to reschedule your appointment please contact the clinic at [phone].    Sincerely,      Your Care Team at [facility]

## 2020-07-23 NOTE — TELEPHONE ENCOUNTER
Patient is scheduled for a physical with Dr. Payne on 07/28; Dr. Payne will not be working in clinic that week. Appointment was rescheduled to 08/25 with check in at 9:45am with Dr. Payne.     Contacted patient with assistance of telephone . Unable to leave message for patient because mailbox was full.    [employer] message and letter sent to patient.    DELVIS PRESLEY MA on 7/23/2020 at 8:39 AM

## 2020-08-24 NOTE — PROGRESS NOTES
"  SUBJECTIVE:   Rajwinder Lama is a 65 year old female who presents for Preventive Visit.    Are you in the first 12 months of your Medicare coverage?  No    Healthy Habits:    In general, how would you rate your overall health?  Very good    Frequency of exercise:  2-3 days/week    Duration of exercise:  15-30 minutes    Do you usually eat at least 4 servings of fruit and vegetables a day, include whole grains    & fiber and avoid regularly eating high fat or \"junk\" foods?  Yes    Taking medications regularly:  No    Barriers to taking medications:  None    Medication side effects:  None    Ability to successfully perform activities of daily living:  No assistance needed    Home Safety:  No safety concerns identified    Hearing Impairment:  Need to ask people to speak up or repeat themselves    In the past 6 months, have you been bothered by leaking of urine?  No    In general, how would you rate your overall mental or emotional health?  Good      PHQ-2 Total Score:    Additional concerns today:  No     Do you feel safe in your environment? Yes    Have you ever done Advance Care Planning? (For example, a Health Directive, POLST, or a discussion with a medical provider or your loved ones about your wishes): No, advance care planning information given to patient to review.  Patient plans to discuss their wishes with loved ones or provider.        Fall risk  Fallen 2 or more times in the past year?: No  Any fall with injury in the past year?: No    Cognitive Screening - unable to complete due to language barrier      Mini-CogTM Copyright JULIÁN Emerson. Licensed by the author for use in [facility]; reprinted with permission ([email or web address]@.[email or web address]). All rights reserved.          Reviewed and updated as needed this visit by clinical staff  Tobacco  Allergies  Meds  Problems  Med Hx  Surg Hx  Fam Hx  Soc Hx          Reviewed and updated as needed this visit by Provider  Allergies  Meds  Problems        Social " History     Tobacco Use     Smoking status: Never Smoker     Smokeless tobacco: Never Used   Substance Use Topics     Alcohol use: Yes     Comment: ethipian drink rarely     If you drink alcohol do you typically have >3 drinks per day or >7 drinks per week? No    Alcohol Use 8/25/2020   Prescreen: >3 drinks/day or >7 drinks/week? -   Prescreen: >3 drinks/day or >7 drinks/week? No         Hypertension Follow-up      Do you check your blood pressure regularly outside of the clinic? Yes     Are you following a low salt diet? Yes    Are your blood pressures ever more than 140 on the top number (systolic) OR more   than 90 on the bottom number (diastolic), for example 140/90? No              Current providers sharing in care for this patient include:   Patient Care Team:  Bismark Payne Mai, MD as PCP - General (Internal Medicine)  Bismark Payne Mai, MD as Assigned PCP    The following health maintenance items are reviewed in Epic and correct as of today:  Health Maintenance   Topic Date Due     ANNUAL REVIEW OF HM ORDERS  1955     ZOSTER IMMUNIZATION (1 of 2) 01/01/2004     PNEUMOCOCCAL IMMUNIZATION 65+ LOW/MEDIUM RISK (1 of 2 - PCV13) 01/01/2019     MAMMO SCREENING  08/08/2019     PHQ-2  01/01/2020     BMP  01/04/2020     FALL RISK ASSESSMENT  02/05/2020     INFLUENZA VACCINE (1) 09/01/2020     MEDICARE ANNUAL WELLNESS VISIT  08/25/2021     LIPID  01/08/2024     ADVANCE CARE PLANNING  01/08/2024     DTAP/TDAP/TD IMMUNIZATION (2 - Td) 06/01/2027     DEXA  Completed     HEPATITIS C SCREENING  Completed     IPV IMMUNIZATION  Aged Out     MENINGITIS IMMUNIZATION  Aged Out     HEPATITIS B IMMUNIZATION  Aged Out     COLORECTAL CANCER SCREENING  Discontinued     Lab work is in process  Pneumonia Vaccine:Adults age 65+ who have not received previous Pneumovax (PPSV23) or PCV13 as an adult: Should first be given PCV13 AND then should be given PPSV23 6-12 months after PCV13    Last 3 Pap and HPV Results:   PAP / HPV  "Latest Ref Rng & Units 2/5/2019 8/1/2017   PAP - NIL NIL   HPV 16 DNA NEG:Negative Positive(A) Positive(A)   HPV 18 DNA NEG:Negative Negative Negative   OTHER HR HPV NEG:Negative Negative Negative       Review of Systems  All other systems on a 10-point review are negative, unless otherwise noted in HPI      OBJECTIVE:   /60 (BP Location: Right arm, Patient Position: Sitting, Cuff Size: Adult Regular)   Pulse 60   Temp 97.5  F (36.4  C) (Tympanic)   Resp 12   Ht 1.58 m (5' 2.21\")   Wt 78.3 kg (172 lb 11.2 oz)   SpO2 97%   Breastfeeding No   BMI 31.38 kg/m   Estimated body mass index is 31.38 kg/m  as calculated from the following:    Height as of this encounter: 1.58 m (5' 2.21\").    Weight as of this encounter: 78.3 kg (172 lb 11.2 oz).  Physical Exam  GENERAL APPEARANCE: healthy, alert and no distress  EYES: Eyes grossly normal to inspection, PERRL and conjunctivae and sclerae normal  HENT: ear canals and TM's normal, nose and mouth without ulcers or lesions, oropharynx clear and oral mucous membranes moist  NECK: no adenopathy, no asymmetry, masses, or scars and thyroid normal to palpation  RESP: lungs clear to auscultation - no rales, rhonchi or wheezes  CV: regular rate and rhythm, normal S1 S2, no S3 or S4, no murmur, click or rub, no peripheral edema and peripheral pulses strong  ABDOMEN: soft, nontender, no hepatosplenomegaly, no masses and bowel sounds normal  MS: no musculoskeletal defects are noted and gait is age appropriate without ataxia  SKIN: no suspicious lesions or rashes  NEURO: Normal strength and tone, sensory exam grossly normal, mentation intact and speech normal  PSYCH: mentation appears normal and affect normal/bright    Diagnostic Test Results:  Labs reviewed in Epic    ASSESSMENT / PLAN:   1. Encounter for Medicare annual wellness exam  64 yo Tajik-speaking pt here with daughter/ for annual preventive health physical.  Reviewed healthy BP and BMI ranges.  " "Counseled on lifestyle modifications for optimal mental and physical health.  Discussed age-appropriate health maintanence.  Additional concerns addressed.      2. Benign essential hypertension  Stable, at goal, will check annual labs today and needs f/up yearly.  - Lipid Profile (Chol, Trig, HDL, LDL calc)  - Comprehensive metabolic panel (BMP + Alb, Alk Phos, ALT, AST, Total. Bili, TP)  - lisinopril-hydrochlorothiazide (ZESTORETIC) 20-12.5 MG tablet; Take 1 tablet by mouth daily Needs appointment for blood pressure recheck before any further refills  Dispense: 90 tablet; Refill: 3    3. Breast cancer screening  - MA SCREENING DIGITAL BILAT - Future  (s+30); Future    4. Need for vaccination with 13-polyvalent pneumococcal conjugate vaccine  - ADMIN 1st VACCINE    COUNSELING:  Reviewed preventive health counseling, as reflected in patient instructions    Estimated body mass index is 31.38 kg/m  as calculated from the following:    Height as of this encounter: 1.58 m (5' 2.21\").    Weight as of this encounter: 78.3 kg (172 lb 11.2 oz).         reports that she has never smoked. She has never used smokeless tobacco.      Appropriate preventive services were discussed with this patient, including applicable screening as appropriate for cardiovascular disease, diabetes, osteopenia/osteoporosis, and glaucoma.  As appropriate for age/gender, discussed screening for colorectal cancer, prostate cancer, breast cancer, and cervical cancer. Checklist reviewing preventive services available has been given to the patient.    Reviewed patients plan of care and provided an AVS. The Intermediate Care Plan ( asthma action plan, low back pain action plan, and migraine action plan) for Rajwinder meets the Care Plan requirement. This Care Plan has been established and reviewed with the Patient.    Counseling Resources:  ATP IV Guidelines  Pooled Cohorts Equation Calculator  Breast Cancer Risk Calculator  FRAX Risk Assessment  ICSI " Preventive Guidelines  Dietary Guidelines for Americans, 2010  USDA's MyPlate  ASA Prophylaxis  Lung CA Screening    Ambar Payne MD  [facility]    Identified Health Risks:    The patient was provided with written information regarding signs of hearing loss.

## 2020-08-25 ENCOUNTER — OFFICE VISIT (OUTPATIENT)
Dept: PEDIATRICS | Facility: CLINIC | Age: 65
End: 2020-08-25
Payer: COMMERCIAL

## 2020-08-25 VITALS
OXYGEN SATURATION: 97 % | HEIGHT: 62 IN | RESPIRATION RATE: 12 BRPM | WEIGHT: 172.7 LBS | DIASTOLIC BLOOD PRESSURE: 60 MMHG | BODY MASS INDEX: 31.78 KG/M2 | SYSTOLIC BLOOD PRESSURE: 126 MMHG | HEART RATE: 60 BPM | TEMPERATURE: 97.5 F

## 2020-08-25 DIAGNOSIS — Z00.00 ENCOUNTER FOR MEDICARE ANNUAL WELLNESS EXAM: Primary | ICD-10-CM

## 2020-08-25 DIAGNOSIS — Z23 NEED FOR VACCINATION WITH 13-POLYVALENT PNEUMOCOCCAL CONJUGATE VACCINE: ICD-10-CM

## 2020-08-25 DIAGNOSIS — Z12.39 BREAST CANCER SCREENING: ICD-10-CM

## 2020-08-25 DIAGNOSIS — I10 BENIGN ESSENTIAL HYPERTENSION: ICD-10-CM

## 2020-08-25 PROCEDURE — 90670 PCV13 VACCINE IM: CPT | Performed by: INTERNAL MEDICINE

## 2020-08-25 PROCEDURE — 36415 COLL VENOUS BLD VENIPUNCTURE: CPT | Performed by: INTERNAL MEDICINE

## 2020-08-25 PROCEDURE — 80061 LIPID PANEL: CPT | Performed by: INTERNAL MEDICINE

## 2020-08-25 PROCEDURE — 99397 PER PM REEVAL EST PAT 65+ YR: CPT | Mod: 25 | Performed by: INTERNAL MEDICINE

## 2020-08-25 PROCEDURE — 90471 IMMUNIZATION ADMIN: CPT | Performed by: INTERNAL MEDICINE

## 2020-08-25 PROCEDURE — 80053 COMPREHEN METABOLIC PANEL: CPT | Performed by: INTERNAL MEDICINE

## 2020-08-25 RX ORDER — LISINOPRIL AND HYDROCHLOROTHIAZIDE 12.5; 2 MG/1; MG/1
1 TABLET ORAL DAILY
Qty: 90 TABLET | Refills: 3 | Status: SHIPPED | OUTPATIENT
Start: 2020-08-25 | End: 2021-11-30

## 2020-08-25 ASSESSMENT — ACTIVITIES OF DAILY LIVING (ADL): CURRENT_FUNCTION: NO ASSISTANCE NEEDED

## 2020-08-25 ASSESSMENT — MIFFLIN-ST. JEOR: SCORE: 1279.86

## 2020-08-25 NOTE — PATIENT INSTRUCTIONS
Patient Education   Personalized Prevention Plan  You are due for the preventive services outlined below.  Your care team is available to assist you in scheduling these services.  If you have already completed any of these items, please share that information with your care team to update in your medical record.  Health Maintenance Due   Topic Date Due     ANNUAL REVIEW OF HM ORDERS  1955     Zoster (Shingles) Vaccine (1 of 2) 01/01/2004     Pneumococcal Vaccine (1 of 2 - PCV13) 01/01/2019     Mammogram  08/08/2019     PHQ-2  01/01/2020     Basic Metabolic Panel  01/04/2020     FALL RISK ASSESSMENT  02/05/2020     Flu Vaccine (1) 09/01/2020     Preventive Health Recommendations    See your health care provider every year to    Review health changes.     Discuss preventive care.      Review your medicines if your doctor has prescribed any.    You no longer need a yearly Pap test unless you've had an abnormal Pap test in the past 10 years. If you have vaginal symptoms, such as bleeding or discharge, be sure to talk with your provider about a Pap test.    Every 1 to 2 years, have a mammogram.  If you are over 69, talk with your health care provider about whether or not you want to continue having screening mammograms.    Every 10 years, have a colonoscopy. Or, have a yearly FIT test (stool test). These exams will check for colon cancer.     Have a cholesterol test every 5 years, or more often if your doctor advises it.     Have a diabetes test (fasting glucose) every three years. If you are at risk for diabetes, you should have this test more often.     At age 65, have a bone density scan (DEXA) to check for osteoporosis (brittle bone disease).    Shots:    Get a flu shot each year.    Get a tetanus shot every 10 years.    Talk to your doctor about your pneumonia vaccines. There are now two you should receive - Pneumovax (PPSV 23) and Prevnar (PCV 13).    Talk to your pharmacist about the shingles  vaccine.    Talk to your doctor about the hepatitis B vaccine.    Nutrition:     Eat at least 5 servings of fruits and vegetables each day.    Eat whole-grain bread, whole-wheat pasta and brown rice instead of white grains and rice.    Get adequate Calcium and Vitamin D.     Lifestyle    Exercise at least 150 minutes a week (30 minutes a day, 5 days a week). This will help you control your weight and prevent disease.    Limit alcohol to one drink per day.    No smoking.     Wear sunscreen to prevent skin cancer.     See your dentist twice a year for an exam and cleaning.    See your eye doctor every 1 to 2 years to screen for conditions such as glaucoma, macular degeneration and cataracts.    Personalized Prevention Plan  You are due for the preventive services outlined below.  Your care team is available to assist you in scheduling these services.  If you have already completed any of these items, please share that information with your care team to update in your medical record.  Health Maintenance   Topic Date Due     ANNUAL REVIEW OF HM ORDERS  1955     ZOSTER IMMUNIZATION (1 of 2) 01/01/2004     PNEUMOCOCCAL IMMUNIZATION 65+ LOW/MEDIUM RISK (1 of 2 - PCV13) 01/01/2019     MAMMO SCREENING  08/08/2019     PHQ-2  01/01/2020     BMP  01/04/2020     FALL RISK ASSESSMENT  02/05/2020     INFLUENZA VACCINE (1) 09/01/2020     MEDICARE ANNUAL WELLNESS VISIT  08/25/2021     LIPID  01/08/2024     ADVANCE CARE PLANNING  01/08/2024     DTAP/TDAP/TD IMMUNIZATION (2 - Td) 06/01/2027     DEXA  Completed     HEPATITIS C SCREENING  Completed     IPV IMMUNIZATION  Aged Out     MENINGITIS IMMUNIZATION  Aged Out     HEPATITIS B IMMUNIZATION  Aged Out     COLORECTAL CANCER SCREENING  Discontinued       Signs of Hearing Loss     Hearing much better with one ear can be a sign of hearing loss.     Hearing loss is a problem shared by many people. In fact, it is one of the most common health conditions, particularly as people age.  Most people over age 65 have some hearing loss, and by age 80, almost everyone does. Because hearing loss usually occurs slowly over the years, you may not realize your hearing ability has gotten worse.  Have your hearing checked  Contact your healthcare provider if you:    Have to strain to hear normal conversation    Have to watch other people s faces very carefully to follow what they re saying    Need to ask people to repeat what they ve said    Often misunderstand what people are saying    Turn the volume of the television or radio up so high that others complain    Feel that people are mumbling when they re talking to you    Find that the effort to hear leaves you feeling tired and irritated    Notice, when using the phone, that you hear better with one ear than the other  Date Last Reviewed: 12/1/2016 2000-2019 [employer]. [address] [place] [address], [place] [address]. All rights reserved. This information is not intended as a substitute for professional medical care. Always follow your healthcare professional's instructions.

## 2020-08-26 LAB
ALBUMIN SERPL-MCNC: 3.6 G/DL (ref 3.4–5)
ALP SERPL-CCNC: 56 U/L (ref 40–150)
ALT SERPL W P-5'-P-CCNC: 25 U/L (ref 0–50)
ANION GAP SERPL CALCULATED.3IONS-SCNC: 6 MMOL/L (ref 3–14)
AST SERPL W P-5'-P-CCNC: 20 U/L (ref 0–45)
BILIRUB SERPL-MCNC: 0.5 MG/DL (ref 0.2–1.3)
BUN SERPL-MCNC: 18 MG/DL (ref 7–30)
CALCIUM SERPL-MCNC: 9.5 MG/DL (ref 8.5–10.1)
CHLORIDE SERPL-SCNC: 107 MMOL/L (ref 94–109)
CHOLEST SERPL-MCNC: 150 MG/DL
CO2 SERPL-SCNC: 29 MMOL/L (ref 20–32)
CREAT SERPL-MCNC: 0.61 MG/DL (ref 0.52–1.04)
GFR SERPL CREATININE-BSD FRML MDRD: >90 ML/MIN/{1.73_M2}
GLUCOSE SERPL-MCNC: 98 MG/DL (ref 70–99)
HDLC SERPL-MCNC: 56 MG/DL
LDLC SERPL CALC-MCNC: 76 MG/DL
NONHDLC SERPL-MCNC: 94 MG/DL
POTASSIUM SERPL-SCNC: 4.1 MMOL/L (ref 3.4–5.3)
PROT SERPL-MCNC: 7.2 G/DL (ref 6.8–8.8)
SODIUM SERPL-SCNC: 142 MMOL/L (ref 133–144)
TRIGL SERPL-MCNC: 88 MG/DL

## 2020-08-26 NOTE — RESULT ENCOUNTER NOTE
Dear Rajwinder,    Your lab results are normal.  At this time, I do not recommend any changes to your current plan of care.    Please feel free to call with any questions.     Sincerely,    Ambar Payne MD

## 2020-08-27 ENCOUNTER — HOSPITAL ENCOUNTER (EMERGENCY)
Facility: CLINIC | Age: 65
Discharge: HOME | End: 2020-08-27
Attending: EMERGENCY MEDICINE | Admitting: EMERGENCY MEDICINE
Payer: COMMERCIAL

## 2020-08-27 ENCOUNTER — OFFICE VISIT (OUTPATIENT)
Dept: URGENT CARE | Facility: URGENT CARE | Age: 65
End: 2020-08-27
Payer: COMMERCIAL

## 2020-08-27 ENCOUNTER — APPOINTMENT (OUTPATIENT)
Dept: MRI IMAGING | Facility: CLINIC | Age: 65
End: 2020-08-27
Attending: EMERGENCY MEDICINE
Payer: COMMERCIAL

## 2020-08-27 ENCOUNTER — TELEPHONE (OUTPATIENT)
Dept: PEDIATRICS | Facility: CLINIC | Age: 65
End: 2020-08-27

## 2020-08-27 VITALS
OXYGEN SATURATION: 98 % | BODY MASS INDEX: 31.69 KG/M2 | HEART RATE: 67 BPM | TEMPERATURE: 99 F | SYSTOLIC BLOOD PRESSURE: 152 MMHG | DIASTOLIC BLOOD PRESSURE: 75 MMHG | WEIGHT: 174.4 LBS

## 2020-08-27 VITALS
OXYGEN SATURATION: 100 % | HEART RATE: 70 BPM | SYSTOLIC BLOOD PRESSURE: 162 MMHG | DIASTOLIC BLOOD PRESSURE: 80 MMHG | TEMPERATURE: 99.1 F | HEIGHT: 62 IN | RESPIRATION RATE: 18 BRPM | WEIGHT: 174.38 LBS | BODY MASS INDEX: 32.09 KG/M2

## 2020-08-27 DIAGNOSIS — R13.10 DYSPHAGIA, UNSPECIFIED TYPE: ICD-10-CM

## 2020-08-27 DIAGNOSIS — G45.9 TIA (TRANSIENT ISCHEMIC ATTACK): Primary | ICD-10-CM

## 2020-08-27 DIAGNOSIS — R42 DIZZINESS: ICD-10-CM

## 2020-08-27 LAB
ANION GAP SERPL CALCULATED.3IONS-SCNC: 5 MMOL/L (ref 3–14)
BASOPHILS # BLD AUTO: 0 10E9/L (ref 0–0.2)
BASOPHILS NFR BLD AUTO: 0.5 %
BUN SERPL-MCNC: 11 MG/DL (ref 7–30)
CALCIUM SERPL-MCNC: 8.6 MG/DL (ref 8.5–10.1)
CHLORIDE SERPL-SCNC: 106 MMOL/L (ref 94–109)
CO2 SERPL-SCNC: 28 MMOL/L (ref 20–32)
CREAT SERPL-MCNC: 0.54 MG/DL (ref 0.52–1.04)
DIFFERENTIAL METHOD BLD: NORMAL
EOSINOPHIL # BLD AUTO: 0.1 10E9/L (ref 0–0.7)
EOSINOPHIL NFR BLD AUTO: 1 %
ERYTHROCYTE [DISTWIDTH] IN BLOOD BY AUTOMATED COUNT: 12.5 % (ref 10–15)
GFR SERPL CREATININE-BSD FRML MDRD: >90 ML/MIN/{1.73_M2}
GLUCOSE SERPL-MCNC: 101 MG/DL (ref 70–99)
HCT VFR BLD AUTO: 40.2 % (ref 35–47)
HGB BLD-MCNC: 13.2 G/DL (ref 11.7–15.7)
IMM GRANULOCYTES # BLD: 0 10E9/L (ref 0–0.4)
IMM GRANULOCYTES NFR BLD: 0.3 %
LYMPHOCYTES # BLD AUTO: 1.7 10E9/L (ref 0.8–5.3)
LYMPHOCYTES NFR BLD AUTO: 29.2 %
MCH RBC QN AUTO: 30.5 PG (ref 26.5–33)
MCHC RBC AUTO-ENTMCNC: 32.8 G/DL (ref 31.5–36.5)
MCV RBC AUTO: 93 FL (ref 78–100)
MONOCYTES # BLD AUTO: 0.6 10E9/L (ref 0–1.3)
MONOCYTES NFR BLD AUTO: 9.7 %
NEUTROPHILS # BLD AUTO: 3.5 10E9/L (ref 1.6–8.3)
NEUTROPHILS NFR BLD AUTO: 59.3 %
NRBC # BLD AUTO: 0 10*3/UL
NRBC BLD AUTO-RTO: 0 /100
PLATELET # BLD AUTO: 158 10E9/L (ref 150–450)
PLATELET # BLD EST: NORMAL 10*3/UL
POTASSIUM SERPL-SCNC: 4.4 MMOL/L (ref 3.4–5.3)
RBC # BLD AUTO: 4.33 10E12/L (ref 3.8–5.2)
RBC MORPH BLD: NORMAL
SODIUM SERPL-SCNC: 139 MMOL/L (ref 133–144)
TROPONIN I SERPL-MCNC: <0.015 UG/L (ref 0–0.04)
WBC # BLD AUTO: 5.9 10E9/L (ref 4–11)

## 2020-08-27 PROCEDURE — 25500064 ZZH RX 255 OP 636: Performed by: EMERGENCY MEDICINE

## 2020-08-27 PROCEDURE — 70553 MRI BRAIN STEM W/O & W/DYE: CPT

## 2020-08-27 PROCEDURE — 84484 ASSAY OF TROPONIN QUANT: CPT | Performed by: EMERGENCY MEDICINE

## 2020-08-27 PROCEDURE — 99285 EMERGENCY DEPT VISIT HI MDM: CPT | Mod: 25

## 2020-08-27 PROCEDURE — 93005 ELECTROCARDIOGRAM TRACING: CPT

## 2020-08-27 PROCEDURE — 80048 BASIC METABOLIC PNL TOTAL CA: CPT | Performed by: EMERGENCY MEDICINE

## 2020-08-27 PROCEDURE — 99215 OFFICE O/P EST HI 40 MIN: CPT | Performed by: FAMILY MEDICINE

## 2020-08-27 PROCEDURE — 85025 COMPLETE CBC W/AUTO DIFF WBC: CPT | Performed by: EMERGENCY MEDICINE

## 2020-08-27 PROCEDURE — A9585 GADOBUTROL INJECTION: HCPCS | Performed by: EMERGENCY MEDICINE

## 2020-08-27 RX ORDER — GADOBUTROL 604.72 MG/ML
7.5 INJECTION INTRAVENOUS ONCE
Status: COMPLETED | OUTPATIENT
Start: 2020-08-27 | End: 2020-08-27

## 2020-08-27 RX ADMIN — GADOBUTROL 7.5 ML: 604.72 INJECTION INTRAVENOUS at 20:24

## 2020-08-27 ASSESSMENT — ENCOUNTER SYMPTOMS
SPEECH DIFFICULTY: 0
SORE THROAT: 0
DIZZINESS: 1
FATIGUE: 0
CHILLS: 0
HEADACHES: 0
FEVER: 0
TROUBLE SWALLOWING: 1

## 2020-08-27 ASSESSMENT — MIFFLIN-ST. JEOR: SCORE: 1284.25

## 2020-08-27 NOTE — TELEPHONE ENCOUNTER
Symptoms    Describe your symptoms: Had the Pneumo Conj 13-V vaccine on 8/25 and now is experiencing vision changes, ringing in the ear, headache and is having a hard time swallowing and eating    Any pain: Yes: head    How long have you been having symptoms: since last night    Have you been seen for this:  No    Appointment offered?: No    Triage offered?: Yes: transferred    Home remedies tried: n/a    Requested Pharmacy: n/a    Okay to leave a detailed message? Yes at Cell number on file:    Telephone Information:   Mobile [phone]       Ayse Henderson,

## 2020-08-27 NOTE — ED TRIAGE NOTES
ABCs intact. Pt received pna vaccine on Tuesday (PCV13). Pt c/o intermittent blurry vision, ear ringing, trouble swallowing, headache since [id number] this morning. Pt states symptoms have all resolved. Pt was seen at urgent care today and sent here for further eval. Denies fever, cough, sob, v/d, sore throat or rash.

## 2020-08-27 NOTE — PROGRESS NOTES
SUBJECTIVE:   Rajwinder Lama is a 65 year old female presenting with a chief complaint of blurry vision, headache, difficulty walking, trouble swallowing, ringing of ears, chills.    Had pneumovax on Tuesday 8/25.  No problems after vaccination.  Site of injection is normal, no redness or swelling.  This is first time that had this vaccination.    Last night developed symptoms which lasted for about 2 hours, occurred 2- 3am.  Initial symptoms was ringing in ears then developed blurry vision with headaches, difficulty with walking and swallowing, felt chills.  These symptoms subsided and patient was able to sleep.    Woke up this morning and was doing okay, ate lunch and was doing well but then started developed similar symptoms around 2:30pm, this last bout 30 minutes.  Contacted triage nurse and told to go to ER, decided to come to Urgent Care.    Prior CT head 1/4/2019 and 4/12/2018 related to hypertensive urgency.    Past Medical History:   Diagnosis Date     Cervical high risk HPV (human papillomavirus) test positive 08/01/2017, 02/05/19 08/01/17 NIL, +HPV 16     Hypertension      Current Outpatient Medications   Medication Sig Dispense Refill     Cholecalciferol (VITAMIN D PO)        lisinopril-hydrochlorothiazide (ZESTORETIC) 20-12.5 MG tablet Take 1 tablet by mouth daily Needs appointment for blood pressure recheck before any further refills 90 tablet 3     Social History     Tobacco Use     Smoking status: Never Smoker     Smokeless tobacco: Never Used   Substance Use Topics     Alcohol use: Yes     Comment: ethipian drink rarely       ROS:  Review of systems negative except as stated above.    OBJECTIVE:  BP (!) 152/75   Pulse 67   Temp 99  F (37.2  C)   Wt 79.1 kg (174 lb 6.4 oz)   SpO2 98%   BMI 31.69 kg/m    GENERAL APPEARANCE: healthy, alert and no distress  EYES: EOMI,  PERRL, conjunctiva clear  HENT: no facial droop, tongue midline  RESP: lungs clear to auscultation  CV: regular rates and  rhythm,  NEURO: bilateral  strong, no arm drift,  normal speech and mentation  SKIN: no suspicious lesions or rashes  PSYCH: mentation appears normal and affect normal/bright    ASSESSMENT/PLAN:  (G45.9) TIA (transient ischemic attack)  (primary encounter diagnosis)  Plan: To ER      Patient is very resistant to going to ER for further evaluation.  Reviewed with daughter that this is 2nd episode of constellation of symptoms that is concerning for possible TIA and needs to have further evaluation that is outside of Urgent Care setting.  Not convinced that this is due to side effect of vaccination but reviewed with patient that Urgent Care is not able to fully evaluate neurologic or cardiac etiology that could be causing symptoms and needs to be seen in ER.  Seem reluctant but will go to [facility] ER, ER notified.  Vitals stable and declined paramedic transport, will go via private car.    Marcos Willson MD  August 27, 2020 4:31 PM

## 2020-08-27 NOTE — ED PROVIDER NOTES
History     Chief Complaint:  Dizziness and trouble swallowing       The history is provided by the patient and a relative.      Rajwinder Lama is a 65 year old female who presents with dizziness and trouble swallowing.  Patient's daughter explains that patient had flu shot a couple days ago and since then has had intermittent episodes of dizziness, head fullness, difficulty swallowing and blurred vision. She has had similar episodes like this before in the past which she related to elevated blood pressure. Today she denies any symptoms and reports that she is at baseline. The daughter explains that she has had 2 such episodes within the last 24 hours. These lasted about 20 minutes and then resolve spontaneously. She denied any episodes of slurred speech or altered level of consciousness during these episodes. She has not had any weakness or numbness in her arms or legs. No significant chest pain, palpitations or shortness of breath. No significant nausea, vomiting or diarrhea. No fevers, chills or headache.    Allergies:  No Known Drug Allergies    Medications:    Lisinopril-hydrochlorothiazide     Past Medical History:    Cyst of iris of left eye  Bilateral incipient cataracts  Hypertension    Past Surgical History:    Ovarian cystectomy  Gall stone removal    Family History:    Father - Hypertension  Mother - CAD, Hypertension    Social History:  The patient was accompanied to the ED by her daughter.  Smoking Status: Never smoker  Smokeless Tobacco: Never used  Alcohol Use: Yes  Drug Use: No  Marital Status:      Review of Systems   Constitutional: Negative for chills, fatigue and fever.   HENT: Positive for trouble swallowing. Negative for sore throat.    Neurological: Positive for dizziness. Negative for syncope, speech difficulty and headaches.   All other systems reviewed and are negative.    Physical Exam     Patient Vitals for the past 24 hrs:   BP Temp Temp src Pulse Resp SpO2 Height Weight  "  20 1950 (!) 162/80 -- -- -- -- 100 % -- --   20 1708 (!) 165/87 99.1  F (37.3  C) Oral 70 18 99 % 1.575 m (5' 2\") 79.1 kg (174 lb 6.1 oz)       Physical Exam  General: Patient is awake, alert and interactive when I enter the room  Head: The scalp, face, and head appear normal  Eyes: The pupils are equal, round, and reactive to light. Conjunctivae and sclerae are normal. No nystagmus. Full ROM of both eyes and appears symmetric without obvious palsy.   ENT: External acoustic canals are normal. The oropharynx is normal without erythema. Uvula is in the midline  Neck: Normal range of motion. No anterior cervical lymphadenopathy noted  CV: Regular rate. S1/S2. No murmurs.   Resp: Lungs are clear without wheezes or rales. No respiratory distress.   GI: Abdomen is soft, no rigidity, guarding, or rebound. No distension. No tenderness to palpation in any quadrant.     MS: Normal tone. Joints grossly normal without effusions. No asymmetric leg swelling, calf or thigh tenderness.    Skin: No rash or lesions noted. Normal capillary refill noted  Neuro:   Speech is normal and fluent.   Face is symmetric without droop. CN's II-XII intact. Negative pronator drift. Finger to nose intact. Heel to shin intact.   Right Arm: Good  strength. 5/5 elbow flexion. 5/5 elbow extension. Sensation intact to light touch.   Left Arm: Good  strength. 5/5 elbow flexion. 5/5 elbow extension. Sensation intact to light touch.   Right Le/5 straight leg raise, 5/5 knee flexion, 5/5 knee extension, 5/5 dorsiflexion, 5/5 plantar flexion. Sensation intact to light touch.   Left Le/5 straight leg raise, 5/5 knee flexion, 5/5 knee extension, 5/5 dorsiflexion, 5/5 plantar flexion. Sensation intact to light touch.    Psych:  Normal affect.  Appropriate interactions.    Emergency Department Course     ECG:  Indication: Dizziness  Completed at 1805.  Sinus rhythm  Normal ECG  When compared with ECG of 2019 20:04,  Premature " atrial complexes are no longer Present   Rate 63 bpm. TN interval 146. QRS duration 80. QT/QTc 428/437. P-R-T axes 5 15 53.  Agree with computer interpretation.     Imaging:  Radiology findings were communicated with the patient and daughter who voiced understanding of the findings.    MR Brain w/o & w Contrast:  1.  Atrophy and chronic vascular changes.  Report per radiology.    MR Brain w/o & w Contrast   Final Result   IMPRESSION:   1.  Atrophy and chronic vascular changes.              Laboratory:  Laboratory findings were communicated with the patient and daughter who voiced understanding of the findings.    CBC: WBC 5.9, HGB 13.2,   BMP: Glucose 101 (H), o/w WNL (Creatinine 0.54)    (1807) Troponin I ES: <0.015     Interventions:  2024 Gadavist 7.5 mL IV    Emergency Department Course:  Past medical records, nursing notes, and vitals reviewed.    1751 I performed an exam of the patient as documented above.     EKG obtained in the ED, see results above.     IV was inserted and blood was drawn for laboratory testing, results above.    The patient was sent for a brain MRI while in the emergency department, results above.     2048 I rechecked the patient and discussed the results of her workup thus far.     Findings and plan explained to the Patient and daughter. Patient discharged home with instructions regarding supportive care, medications, and reasons to return. The importance of close follow-up was reviewed.    I personally reviewed the laboratory and imaging results with the Patient and daughter and answered all related questions prior to discharge.     Impression & Plan     Medical Decision Making:  Rajwinder Lama is a 65 year old female who presents for evaluation of dizziness and trouble swallowing. Patient's daughter explains that patient had flu shot a couple days ago and since then has had intermittent episodes of dizziness, head fullness, difficulty swallowing and blurred vision. She has had  similar episodes like this before in the past which she related to elevated blood pressure. Today she denies any symptoms and reports that she is at baseline.  Evaluation she is mildly hypertensive but otherwise hemodynamically stable with no vital signs.  She is afebrile.  Here in the emergency department she has a completely normal neurologic evaluation.  She has no significant deficits.  I did perform an MRI which does not show any acute signs of ischemia, mass or hemorrhage.  My suspicion that this is a TIA or CVA is quite low.  Given that the patient is very well-appearing I feel that it is appropriate for the patient to follow-up closely with her primary care doctor at which time they can further do risk stratification for possible stroke.  Not believe she requires inpatient hospitalization at this time.  My findings and plan with the patient and her daughter and they are amenable at this time.  All questions were answered patient be discharged home in stable condition.    Diagnosis:    ICD-10-CM    1. Dizziness  R42    2. Dysphagia, unspecified type  R13.10        Disposition:  Discharged to home.    Discharge Medications:  None.      Scribe Disclosure:  I, Ebony Merino, am serving as a scribe at 5:51 PM on 8/27/2020 to document services personally performed by Lupillo Guaman MD based on my observations and the provider's statements to me.     Ebony Merino  8/27/2020   [facility] EMERGENCY DEPARTMENT       Lupillo Guaman MD  08/27/20 [id number]

## 2020-08-27 NOTE — ED AVS SNAPSHOT
[facility] Emergency Department  [address]  [facility] [address]  Phone:  [phone]  Fax:  [phone]                                    Rajwinder Lama   MRN: [id number]    Department:  [facility] Emergency Department   Date of Visit:  8/27/2020           After Visit Summary Signature Page    I have received my discharge instructions, and my questions have been answered. I have discussed any challenges I see with this plan with the nurse or doctor.    ..........................................................................................................................................  Patient/Patient Representative Signature      ..........................................................................................................................................  Patient Representative Print Name and Relationship to Patient    ..................................................               ................................................  Date                                   Time    ..........................................................................................................................................  Reviewed by Signature/Title    ...................................................              ..............................................  Date                                               Time          22EPIC Rev 08/18

## 2020-08-27 NOTE — PATIENT INSTRUCTIONS
Go to ER      Patient Education     What Is a TIA?    A TIA (transient ischemic attack) is an early warning that a stroke (also called a brain attack) is coming. A TIA is a temporary stroke. It causes no lasting damage. But the effects of a stroke, if it happens, can be very serious and lasting. If you think you are having symptoms of a TIA or stroke--even if they don t last--get medical help right away.  Symptoms of TIA and stroke  Symptoms may come on suddenly and last for a few seconds or a few hours. You may have symptoms only once. Or they may come and go for days. If you notice any of the following symptoms, don t wait. Call 911 or emergency services right away.    Weakness, numbness, tingling, or loss of feeling in your face, arm, or leg    Trouble seeing in one or both eyes; double vision    Slurred speech, trouble talking, or problems understanding others when they speak    Sudden, severe headache    Dizziness or a feeling of spinning    Loss of balance or falling    Blackouts  F.A.S.T. is an easy way to remember the signs of a stroke. When you see the signs, you will know what you need to call 911 fast.   F.A.S.T. stands for:     F is for face drooping. One side of the face is drooping or numb. When the person smiles, the smile is uneven.    A is for arm weakness. One arm is weak or numb. When the person lifts both arms and the same time, one arm may drift downward.    S is for speech difficulty. You may notice slurred speech or trouble speaking. The person can't repeat a simple sentence correctly when asked.    T is for time to call 911. If someone shows any of these symptoms, even if they go away, call 911 right away. Make note of the time the symptoms first appeared.  Date Last Reviewed: 7/1/2017 2000-2019 [employer]. [address] [facility], [place] [address]. All rights reserved. This information is not intended as a substitute for professional medical care. Always follow your  healthcare professional's instructions.

## 2020-08-27 NOTE — TELEPHONE ENCOUNTER
Spoke with Daughter.   Daughter also noted that patient was confused all last night.     Advised to go to ER for evaluation of stroke.   Daughter will bring patient right away.

## 2020-08-28 LAB — INTERPRETATION ECG - MUSE: NORMAL

## 2020-08-31 ENCOUNTER — APPOINTMENT (OUTPATIENT)
Dept: OPTOMETRY | Facility: CLINIC | Age: 65
End: 2020-08-31
Payer: COMMERCIAL

## 2020-08-31 PROCEDURE — 92341 FIT SPECTACLES BIFOCAL: CPT | Performed by: OPTOMETRIST

## 2020-12-24 ENCOUNTER — PATIENT OUTREACH (OUTPATIENT)
Dept: PEDIATRICS | Facility: CLINIC | Age: 65
End: 2020-12-24

## 2020-12-24 DIAGNOSIS — R87.810 CERVICAL HIGH RISK HPV (HUMAN PAPILLOMAVIRUS) TEST POSITIVE: ICD-10-CM

## 2020-12-24 NOTE — TELEPHONE ENCOUNTER
08/01/17 NIL, +HPV 16. Plan colp  12/15/17 [place]- ZO 2-3.  Plan: LEEP due by 3/15/18  2/14/18 LEEP- negative. Plan for 1 yr co-test.  02/05/19: NIL pap, + HR HPV type 16 result. Plan [place].    05/06/19 3mo colp not done, chart updated for 6mo colp/pap.   8/21/19 [place]- Negative. Plan 1 yr co-test  11/24/20 Reminder My Chart  12/24/20 Reminder call - spoke with son in law- see CTC

## 2020-12-27 ENCOUNTER — HEALTH MAINTENANCE LETTER (OUTPATIENT)
Age: 65
End: 2020-12-27

## 2021-01-25 PROBLEM — R87.810 CERVICAL HIGH RISK HPV (HUMAN PAPILLOMAVIRUS) TEST POSITIVE: Status: ACTIVE | Noted: 2017-08-01

## 2021-01-25 NOTE — TELEPHONE ENCOUNTER
FYI to provider - Patient is lost to pap tracking follow-up. Attempts to contact pt have been made per reminder process and there has been no reply and/or no appt scheduled.       08/01/17 NIL, +HPV 16. Plan colp  12/15/17 [place]- ZO 2-3.  Plan: LEEP due by 3/15/18  2/14/18 LEEP- negative. Plan for 1 yr co-test.  02/05/19: NIL pap, + HR HPV type 16 result. Plan [place].    05/06/19 3mo colp not done, chart updated for 6mo colp/pap.   8/21/19 [place]- Negative. Plan 1 yr co-test  11/24/20 Reminder My Chart  12/24/20 Reminder call - spoke with son in law- see CTC  1/25/21 Lost to follow-up for pap tracking

## 2021-08-16 ENCOUNTER — APPOINTMENT (OUTPATIENT)
Dept: OPTOMETRY | Facility: CLINIC | Age: 66
End: 2021-08-16
Payer: COMMERCIAL

## 2021-08-16 PROCEDURE — 92341 FIT SPECTACLES BIFOCAL: CPT | Performed by: OPTOMETRIST

## 2021-10-09 ENCOUNTER — HEALTH MAINTENANCE LETTER (OUTPATIENT)
Age: 66
End: 2021-10-09

## 2022-03-23 DIAGNOSIS — I10 BENIGN ESSENTIAL HYPERTENSION: ICD-10-CM

## 2022-03-24 RX ORDER — LISINOPRIL AND HYDROCHLOROTHIAZIDE 12.5; 2 MG/1; MG/1
1 TABLET ORAL DAILY
Qty: 90 TABLET | Refills: 0 | Status: SHIPPED | OUTPATIENT
Start: 2022-03-24 | End: 2022-07-13

## 2022-03-24 NOTE — TELEPHONE ENCOUNTER
Patient has upcoming appointment scheduled 4/13/22. Routing refill request to provider for review/approval because:  Labs out of range:  BP  Labs not current:  creatinine, potassium, sodium    BP Readings from Last 3 Encounters:   08/27/20 (!) 162/80   08/27/20 (!) 152/75   08/25/20 126/60     Moose CHAVEZ RN

## 2022-03-26 ENCOUNTER — HEALTH MAINTENANCE LETTER (OUTPATIENT)
Age: 67
End: 2022-03-26

## 2022-04-12 ASSESSMENT — ENCOUNTER SYMPTOMS: BREAST MASS: 0

## 2022-04-12 ASSESSMENT — ACTIVITIES OF DAILY LIVING (ADL): CURRENT_FUNCTION: NO ASSISTANCE NEEDED

## 2022-04-13 ENCOUNTER — OFFICE VISIT (OUTPATIENT)
Dept: PEDIATRICS | Facility: CLINIC | Age: 67
End: 2022-04-13
Payer: COMMERCIAL

## 2022-04-13 VITALS
DIASTOLIC BLOOD PRESSURE: 80 MMHG | HEART RATE: 58 BPM | OXYGEN SATURATION: 98 % | HEIGHT: 62 IN | TEMPERATURE: 98.4 F | WEIGHT: 170.3 LBS | RESPIRATION RATE: 16 BRPM | SYSTOLIC BLOOD PRESSURE: 160 MMHG | BODY MASS INDEX: 31.34 KG/M2

## 2022-04-13 DIAGNOSIS — I10 BENIGN ESSENTIAL HYPERTENSION: ICD-10-CM

## 2022-04-13 DIAGNOSIS — Z12.31 VISIT FOR SCREENING MAMMOGRAM: ICD-10-CM

## 2022-04-13 DIAGNOSIS — Z12.4 CERVICAL CANCER SCREENING: ICD-10-CM

## 2022-04-13 DIAGNOSIS — Z00.00 ENCOUNTER FOR MEDICARE ANNUAL WELLNESS EXAM: Primary | ICD-10-CM

## 2022-04-13 PROCEDURE — 80048 BASIC METABOLIC PNL TOTAL CA: CPT | Performed by: STUDENT IN AN ORGANIZED HEALTH CARE EDUCATION/TRAINING PROGRAM

## 2022-04-13 PROCEDURE — G0145 SCR C/V CYTO,THINLAYER,RESCR: HCPCS | Performed by: STUDENT IN AN ORGANIZED HEALTH CARE EDUCATION/TRAINING PROGRAM

## 2022-04-13 PROCEDURE — 36415 COLL VENOUS BLD VENIPUNCTURE: CPT | Performed by: STUDENT IN AN ORGANIZED HEALTH CARE EDUCATION/TRAINING PROGRAM

## 2022-04-13 PROCEDURE — 80061 LIPID PANEL: CPT | Performed by: STUDENT IN AN ORGANIZED HEALTH CARE EDUCATION/TRAINING PROGRAM

## 2022-04-13 PROCEDURE — 87624 HPV HI-RISK TYP POOLED RSLT: CPT | Performed by: STUDENT IN AN ORGANIZED HEALTH CARE EDUCATION/TRAINING PROGRAM

## 2022-04-13 PROCEDURE — 99397 PER PM REEVAL EST PAT 65+ YR: CPT | Mod: GC | Performed by: STUDENT IN AN ORGANIZED HEALTH CARE EDUCATION/TRAINING PROGRAM

## 2022-04-13 ASSESSMENT — ACTIVITIES OF DAILY LIVING (ADL): CURRENT_FUNCTION: NO ASSISTANCE NEEDED

## 2022-04-13 ASSESSMENT — ENCOUNTER SYMPTOMS: BREAST MASS: 0

## 2022-04-13 ASSESSMENT — PAIN SCALES - GENERAL: PAINLEVEL: NO PAIN (0)

## 2022-04-13 NOTE — PROGRESS NOTES
"SUBJECTIVE:   Rajwinder Lama is a 67 year old female who presents for Preventive Visit.      Patient has been advised of split billing requirements and indicates understanding: Yes  Are you in the first 12 months of your Medicare coverage?  No    Healthy Habits:     In general, how would you rate your overall health?  Good    Frequency of exercise:  1 day/week    Duration of exercise:  15-30 minutes    Do you usually eat at least 4 servings of fruit and vegetables a day, include whole grains    & fiber and avoid regularly eating high fat or \"junk\" foods?  Yes    Taking medications regularly:  Yes    Barriers to taking medications:  None    Medication side effects:  None    Ability to successfully perform activities of daily living:  No assistance needed    Home Safety:  Lack of handrails on stairs    Hearing Impairment:  No hearing concerns    In the past 6 months, have you been bothered by leaking of urine?  No    In general, how would you rate your overall mental or emotional health?  Good      PHQ-2 Total Score: 0    Additional concerns today:  No    No health concerns.   Feeling well.   She lives with her daughter who is here with her today and serving as Vietnamese .     #HTN  Sometimes checks at home, other daughter checks it and is a nurse and has told her it is \"perfect\" but patient and daughter here today do not know numbers at home. Takes medication everyday (lisinopril-hydrochlorothiazide). Elevated today.  BP Readings from Last 3 Encounters:   04/13/22 (!) 162/82   08/27/20 (!) 162/80   08/27/20 (!) 152/75       #HCM  Mammo - last in 2017. No fam history of breast cancer   Pap - Had high risk HPV in 2019, colposcopy without dysplasia but recommended for repeat in 1 year. No exam since  Colonoscopy - never done. Daughter reports never received stool test at home. No family history of colon cancer     Do you feel safe in your environment? Yes    Have you ever done Advance Care Planning? (For " example, a Health Directive, POLST, or a discussion with a medical provider or your loved ones about your wishes): No, advance care planning information given to patient to review.  Patient plans to discuss their wishes with loved ones or provider.         Fall risk  Fallen 2 or more times in the past year?: (P) No  Any fall with injury in the past year?: (P) No    Cognitive Screening   1) Repeat 3 items (Leader, Season, Table)    2) Clock draw: NORMAL  3) 3 item recall: Recalls 2 objects   Results: NORMAL clock, 1-2 items recalled: COGNITIVE IMPAIRMENT LESS LIKELY    Mini-CogTM Copyright S Ki. Licensed by the author for use in [facility]; reprinted with permission (soob@[facility]). All rights reserved.      Do you have sleep apnea, excessive snoring or daytime drowsiness?: no    Reviewed and updated as needed this visit by clinical staff   Tobacco  Allergies       Soc Hx          Reviewed and updated as needed this visit by Provider                   Social History     Tobacco Use     Smoking status: Never Smoker     Smokeless tobacco: Never Used   Substance Use Topics     Alcohol use: Not Currently     Comment: ethipian drink rarely     If you drink alcohol do you typically have >3 drinks per day or >7 drinks per week? No    Alcohol Use 4/13/2022   Prescreen: >3 drinks/day or >7 drinks/week? -   Prescreen: >3 drinks/day or >7 drinks/week? No     Current providers sharing in care for this patient include:   Patient Care Team:  Bismark Payne Mai, MD as PCP - General (Internal Medicine)  Bismark Payne Mai, MD as Assigned PCP    The following health maintenance items are reviewed in Epic and correct as of today:  Health Maintenance Due   Topic Date Due     ZOSTER IMMUNIZATION (1 of 2) Never done     MAMMO SCREENING  08/08/2019     PAP FOLLOW-UP  08/21/2020     HPV FOLLOW-UP  08/21/2020     ANNUAL REVIEW OF HM ORDERS  08/25/2021     Pneumococcal Vaccine: 65+ Years (2 of 2 - PPSV23) 08/25/2021     BMP  " 08/27/2021     INFLUENZA VACCINE (1) 09/01/2021     Lab work is in process    Any new diagnosis of family breast, ovarian, or bowel cancer? No    FHS-7: No flowsheet data found.    Mammogram Screening: Recommended mammography every 1-2 years with patient discussion and risk factor consideration  Pertinent mammograms are reviewed under the imaging tab.    Review of Systems   Breasts:  Negative for tenderness, breast mass and discharge.   Genitourinary: Negative for pelvic pain, vaginal bleeding and vaginal discharge.     Constitutional, HEENT, cardiovascular, pulmonary, gi and gu systems are negative, except as otherwise noted.    OBJECTIVE:   BP (!) 162/82 (BP Location: Right arm, Patient Position: Chair, Cuff Size: Adult Regular)   Pulse 58   Temp 98.4  F (36.9  C) (Tympanic)   Resp 16   Ht 1.562 m (5' 1.5\")   Wt 77.2 kg (170 lb 4.8 oz)   SpO2 98%   BMI 31.66 kg/m   Estimated body mass index is 31.66 kg/m  as calculated from the following:    Height as of this encounter: 1.562 m (5' 1.5\").    Weight as of this encounter: 77.2 kg (170 lb 4.8 oz).  Physical Exam  GENERAL: healthy, alert and no distress  NECK: no adenopathy, no asymmetry, masses, or scars and thyroid normal to palpation  RESP: lungs clear to auscultation - no rales, rhonchi or wheezes  CV: regular rate and rhythm, normal S1 S2, no S3 or S4, no murmur, click or rub, no peripheral edema and peripheral pulses strong  ABDOMEN: soft, nontender, no hepatosplenomegaly, no masses and bowel sounds normal   (female): normal female external genitalia, normal urethral meatus, vaginal mucosa, normal cervix/adnexa/uterus without masses or discharge  MS: no gross musculoskeletal defects noted, no edema    Diagnostic Test Results:  Labs reviewed in Epic    ASSESSMENT / PLAN:       ICD-10-CM    1. Encounter for Medicare annual wellness exam  Z00.00 BASIC METABOLIC PANEL     Lipid panel reflex to direct LDL Fasting     BASIC METABOLIC PANEL     Lipid panel " "reflex to direct LDL Fasting   2. Visit for screening mammogram  Z12.31 MA SCREENING DIGITAL BILAT - Future  (s+30)   3. Cervical cancer screening  Z12.4 Pap Screen with HPV - recommended age 30 - 65 years     COLOGUARD(EXACT SCIENCES)     HPV Hold (Lab Only)   4. Benign essential hypertension  I10    4. Hypertension - BP elevated on 2 checks today, requested check at home for several days and send us the results     Patient has been advised of split billing requirements and indicates understanding: Yes    COUNSELING:  Reviewed preventive health counseling, as reflected in patient instructions    Estimated body mass index is 31.66 kg/m  as calculated from the following:    Height as of this encounter: 1.562 m (5' 1.5\").    Weight as of this encounter: 77.2 kg (170 lb 4.8 oz).    Weight management plan: Discussed healthy diet and exercise guidelines    She reports that she has never smoked. She has never used smokeless tobacco.      Appropriate preventive services were discussed with this patient, including applicable screening as appropriate for cardiovascular disease, diabetes, osteopenia/osteoporosis, and glaucoma.  As appropriate for age/gender, discussed screening for colorectal cancer, prostate cancer, breast cancer, and cervical cancer. Checklist reviewing preventive services available has been given to the patient.    Reviewed patients plan of care and provided an AVS. The Basic Care Plan (routine screening as documented in Health Maintenance) for Rajwinder meets the Care Plan requirement. This Care Plan has been established and reviewed with the Patient and caregiver.    Counseling Resources:  ATP IV Guidelines  Pooled Cohorts Equation Calculator  Breast Cancer Risk Calculator  Breast Cancer: Medication to Reduce Risk  FRAX Risk Assessment  ICSI Preventive Guidelines  Dietary Guidelines for Americans, 2010  USDA's MyPlate  ASA Prophylaxis  Lung CA Screening    Linda Jones MD  [facility] " [facility] ENDY    I have seen the patient, discussed with the resident and agree with the history, physical and plan as documented above.    Jennifer Weinstein MD  Internal Medicine - Pediatrics        Identified Health Risks:

## 2022-04-13 NOTE — PATIENT INSTRUCTIONS
- they will call to help you schedule a mammorgram to check for breast cancer  - we will be in touch about the results of your pap from today   - we will send a new kit to the house for a stool sample to check for colon cancer   - we may need to increase your blood pressure medication, please continue checking at home and have your daughter send us the most recent measurements through [facility]     Patient Education   Personalized Prevention Plan  You are due for the preventive services outlined below.  Your care team is available to assist you in scheduling these services.  If you have already completed any of these items, please share that information with your care team to update in your medical record.  Health Maintenance Due   Topic Date Due    Zoster (Shingles) Vaccine (1 of 2) Never done    Mammogram  08/08/2019    PAP  08/21/2020    HPV Follow Up  08/21/2020    Annual Wellness Visit  08/25/2021    ANNUAL REVIEW OF HM ORDERS  08/25/2021    Pneumococcal Vaccine (2 of 2 - PPSV23) 08/25/2021    Basic Metabolic Panel  08/27/2021    Flu Vaccine (1) 09/01/2021

## 2022-04-14 LAB
ANION GAP SERPL CALCULATED.3IONS-SCNC: 6 MMOL/L (ref 3–14)
BUN SERPL-MCNC: 13 MG/DL (ref 7–30)
CALCIUM SERPL-MCNC: 9.3 MG/DL (ref 8.5–10.1)
CHLORIDE BLD-SCNC: 108 MMOL/L (ref 94–109)
CHOLEST SERPL-MCNC: 135 MG/DL
CO2 SERPL-SCNC: 28 MMOL/L (ref 20–32)
CREAT SERPL-MCNC: 0.55 MG/DL (ref 0.52–1.04)
FASTING STATUS PATIENT QL REPORTED: YES
GFR SERPL CREATININE-BSD FRML MDRD: >90 ML/MIN/1.73M2
GLUCOSE BLD-MCNC: 86 MG/DL (ref 70–99)
HDLC SERPL-MCNC: 51 MG/DL
LDLC SERPL CALC-MCNC: 65 MG/DL
NONHDLC SERPL-MCNC: 84 MG/DL
POTASSIUM BLD-SCNC: 3.5 MMOL/L (ref 3.4–5.3)
SODIUM SERPL-SCNC: 142 MMOL/L (ref 133–144)
TRIGL SERPL-MCNC: 96 MG/DL

## 2022-04-15 LAB
BKR LAB AP GYN ADEQUACY: NORMAL
BKR LAB AP GYN INTERPRETATION: NORMAL
BKR LAB AP HPV REFLEX: NORMAL
BKR LAB AP PREVIOUS ABNL DX: NORMAL
BKR LAB AP PREVIOUS ABNORMAL: NORMAL
PATH REPORT.COMMENTS IMP SPEC: NORMAL
PATH REPORT.COMMENTS IMP SPEC: NORMAL
PATH REPORT.RELEVANT HX SPEC: NORMAL

## 2022-04-18 LAB
HUMAN PAPILLOMA VIRUS 16 DNA: POSITIVE
HUMAN PAPILLOMA VIRUS 18 DNA: NEGATIVE
HUMAN PAPILLOMA VIRUS FINAL DIAGNOSIS: ABNORMAL
HUMAN PAPILLOMA VIRUS OTHER HR: POSITIVE

## 2022-04-19 ENCOUNTER — PATIENT OUTREACH (OUTPATIENT)
Dept: PEDIATRICS | Facility: CLINIC | Age: 67
End: 2022-04-19
Payer: COMMERCIAL

## 2022-05-14 LAB — NONINV COLON CA DNA+OCC BLD SCRN STL QL: NEGATIVE

## 2022-05-26 ENCOUNTER — OFFICE VISIT (OUTPATIENT)
Dept: OBGYN | Facility: CLINIC | Age: 67
End: 2022-05-26
Payer: COMMERCIAL

## 2022-05-26 VITALS — WEIGHT: 173 LBS | BODY MASS INDEX: 32.16 KG/M2 | SYSTOLIC BLOOD PRESSURE: 158 MMHG | DIASTOLIC BLOOD PRESSURE: 76 MMHG

## 2022-05-26 DIAGNOSIS — R87.810 CERVICAL HIGH RISK HPV (HUMAN PAPILLOMAVIRUS) TEST POSITIVE: Primary | ICD-10-CM

## 2022-05-26 PROCEDURE — 88342 IMHCHEM/IMCYTCHM 1ST ANTB: CPT | Performed by: PATHOLOGY

## 2022-05-26 PROCEDURE — 57456 ENDOCERV CURETTAGE W/SCOPE: CPT | Performed by: OBSTETRICS & GYNECOLOGY

## 2022-05-26 PROCEDURE — 88305 TISSUE EXAM BY PATHOLOGIST: CPT | Performed by: PATHOLOGY

## 2022-05-26 NOTE — PROGRESS NOTES
67 year old  presents for colposcopy.      Indication for procedure:HPV (Human Papillomavirus) positive.     Pap on 22 was WNL with +HPV16 and +HPV other     There are many types of HPV, but we test Pap samples for the high-risk types. HPV can be the cause of an abnormal Pap smear result.  The high-risk types of HPV can also be related to the potential development of cervical cancer if not monitored and/or treated appropriately  Prior history of cervical dysplasia: Yes ZO 2-3 diagnosed on colposcopically directed cervical biopsies 2017    LEEP 2018 with no residual dysplasia    Colpo 2019 with neg cervix biopsy and benign ECC      No LMP recorded. Patient is postmenopausal.    Tobacco: No  Gardasil vaccination status:No    Discussed nature of HPV related infection, natural history and association with cervical dysplasia.  Procedure for colposcopy and biopsy was explained to the patient and consent obtained.  All the patient's questions were answered.    PROCEDURE:  COLPOSCOPY  After a procedural timeout was taken, she was positioned in dorsal lithotomy and a speculum was inserted to allow visualization of the cervix. A 5% acetic acid solution was applied to the ectocervix with large swabs. Lugols solution was also applied.  Colposcopic examination was then undertaken of the cervix, distal vaginal canal and vaginal fornices.    FINDINGS:Physical Exam  Genitourinary:          No ectocervical lesions seen    Procedures        Plan: Specimens labelled and sent to pathology., Will base further treatment on pathology findings. and post biopsy instructions given to patient.      Lupillo Lincoln MD

## 2022-05-31 LAB
PATH REPORT.COMMENTS IMP SPEC: NORMAL
PATH REPORT.COMMENTS IMP SPEC: NORMAL
PATH REPORT.FINAL DX SPEC: NORMAL
PATH REPORT.GROSS SPEC: NORMAL
PATH REPORT.MICROSCOPIC SPEC OTHER STN: NORMAL
PATH REPORT.MICROSCOPIC SPEC OTHER STN: NORMAL
PATH REPORT.RELEVANT HX SPEC: NORMAL
PHOTO IMAGE: NORMAL

## 2022-06-13 ENCOUNTER — PATIENT OUTREACH (OUTPATIENT)
Dept: PEDIATRICS | Facility: CLINIC | Age: 67
End: 2022-06-13
Payer: COMMERCIAL

## 2022-06-13 PROBLEM — N87.0 MILD DYSPLASIA OF CERVIX: Status: ACTIVE | Noted: 2017-08-01

## 2022-07-08 DIAGNOSIS — I10 BENIGN ESSENTIAL HYPERTENSION: ICD-10-CM

## 2022-07-12 NOTE — TELEPHONE ENCOUNTER
Routing refill request to provider for review/approval because:  BP Readings from Last 3 Encounters:   05/26/22 (!) 158/76   04/13/22 (!) 160/80   08/27/20 (!) 162/80      Harriet Alas RN

## 2022-07-13 RX ORDER — LISINOPRIL AND HYDROCHLOROTHIAZIDE 12.5; 2 MG/1; MG/1
1 TABLET ORAL DAILY
Qty: 90 TABLET | Refills: 0 | Status: SHIPPED | OUTPATIENT
Start: 2022-07-13 | End: 2022-11-01

## 2022-07-13 NOTE — TELEPHONE ENCOUNTER
Refill sent.  Forwarded to [facility] - can we help facilitate a visit for a bp check - a MTM visit would be even better if family was on board!    Jennifer Weinstein MD

## 2022-07-15 NOTE — TELEPHONE ENCOUNTER
Routing to MA/TC pool:  Refill of Lisinopril hydrochlorothiazide filled.   -please assist patient in scheduling an MTM visit.   If patient does not want MTM visit, then a blood pressure check appt is needed.     Christian Diallo RN on 7/15/2022 at 12:01 PM

## 2022-08-05 ENCOUNTER — ALLIED HEALTH/NURSE VISIT (OUTPATIENT)
Dept: PEDIATRICS | Facility: CLINIC | Age: 67
End: 2022-08-05
Payer: COMMERCIAL

## 2022-08-05 ENCOUNTER — OFFICE VISIT (OUTPATIENT)
Dept: PHARMACY | Facility: CLINIC | Age: 67
End: 2022-08-05
Payer: COMMERCIAL

## 2022-08-05 VITALS
SYSTOLIC BLOOD PRESSURE: 136 MMHG | BODY MASS INDEX: 32.36 KG/M2 | WEIGHT: 174.1 LBS | OXYGEN SATURATION: 98 % | DIASTOLIC BLOOD PRESSURE: 76 MMHG | HEART RATE: 56 BPM

## 2022-08-05 DIAGNOSIS — Z23 HIGH PRIORITY FOR 2019-NCOV VACCINE: Primary | ICD-10-CM

## 2022-08-05 DIAGNOSIS — I10 BENIGN ESSENTIAL HYPERTENSION: Primary | ICD-10-CM

## 2022-08-05 DIAGNOSIS — Z71.85 VACCINE COUNSELING: ICD-10-CM

## 2022-08-05 DIAGNOSIS — M85.80 OSTEOPENIA, UNSPECIFIED LOCATION: ICD-10-CM

## 2022-08-05 PROCEDURE — 0064A COVID-19,PF,MODERNA (18+ YRS BOOSTER .25ML): CPT

## 2022-08-05 PROCEDURE — 99607 MTMS BY PHARM ADDL 15 MIN: CPT | Performed by: PHARMACIST

## 2022-08-05 PROCEDURE — 99605 MTMS BY PHARM NP 15 MIN: CPT | Performed by: PHARMACIST

## 2022-08-05 PROCEDURE — 91306 COVID-19,PF,MODERNA (18+ YRS BOOSTER .25ML): CPT

## 2022-08-05 NOTE — PROGRESS NOTES
"Medication Therapy Management (MTM) Encounter    ASSESSMENT:                            Medication Adherence/Access: No issues identified    Hypertension: Patient is not meeting blood pressure goal of < 130/80mmHg. No changes today, I will have her check at home to clarify if blood pressure is at goal or not at home. If not then will recommend we increase lisinopril dose. Want to avoid hydrochlorothiazide increase due to osteopenia.    Osteopenia: Patient would benefit from:additional supplementation with calcium and vitamin D.    Vaccines: candidate for covid booster, Shingrix, and PPSV23    PLAN:                            1. Recommend she check blood pressure at home and record on paper, will review over the phone in a week. Patient's son will be present to interpret.     2. Start multivitamin for senior + women daily.     3. Patient would like covid booster today (added to nurse only schedule). Will discuss Shingrix and PPSV23 next visit.    4. I reviewed chart there is a mammogram order, recommend patient schedule at .        Follow-up: Return in about 12 days (around 8/17/2022) for Medication Therapy Telephone Visit.    SUBJECTIVE/OBJECTIVE:                          Rajwinder Lama is a 67 year old female coming in for an initial visit. She was referred to me from Dr. Weinstein, PCP is Dr. Payne. Patient was accompanied by son. Professional phone  n/a (ID# -).      Reason for visit: hypertension management. Son also wondering about mammogram.    Allergies/ADRs: Reviewed in chart  Past Medical History: Reviewed in chart  Tobacco: She reports that she has never smoked. She has never used smokeless tobacco.  Alcohol: socially on occasion only.      Medication Adherence/Access: no issues reported    Hypertension: Current medications include lisinopril-hydrochlorothiazide 20-12.5mg every day.    Previously reported home blood pressure are \"good\". Lives with daughter who is a nurse who can help " check blood pressure, she has an upper arm cuff at home. No readings for us to review today.  Patient reports no current medication side effects.  She reports using Advil as needed rarely, none this past month.   She reports using using minimal salt diet.   Caffeine: 1 cup a day of coffee, tea and pop rarely.   She does have a blood pressure machine at home.    BP Readings from Last 3 Encounters:   08/05/22 136/76   05/26/22 (!) 158/76   04/13/22 (!) 160/80     Osteopenia: Current therapy includes: winter season will take Vitamin D supplements: unknown IU.  DEXA History: 2/2019 FRAX (based on available information) are 3.7 % for major osteoporotic fracture and 0.4 % for hip fracture  Patient is getting the following sources of dietary calcium: sometimes milk not always  Last vitamin D level:  No results found for: VITDT    Vaccines:  Most Recent Immunizations   Administered Date(s) Administered     COVID-19,PF,Moderna 01/20/2022     COVID-19,PF,Moderna Booster 08/05/2022     Pneumo Conj 13-V (2010&after) 08/25/2020     TDAP Vaccine (Adacel) 06/01/2017         Today's Vitals: /76   Pulse 56   Wt 174 lb 1.6 oz (79 kg)   SpO2 98%   BMI 32.36 kg/m    ----------------      I spent 50 minutes with this patient today. All changes were made via collaborative practice agreement with Ambar Payne MD. A copy of the visit note was provided to the patient's provider(s).    The patient was given a summary of these recommendations.     Cheyenne Trevizo, PharmD  Medication Therapy Management Pharmacist       Medication Therapy Recommendations  Osteopenia, unspecified location    Rationale: Untreated condition - Needs additional medication therapy - Indication   Recommendation: Start Medication - MULTIVITAMIN ADULT PO   Status: Accepted - no CPA Needed         Vaccine counseling    Rationale: Preventive therapy - Needs additional medication therapy - Indication   Recommendation: Start Medication - MODERNA COVID-19  VACCINE IM   Status: Accepted per Provider

## 2022-08-05 NOTE — PATIENT INSTRUCTIONS
"Recommendations from today's MTM visit:                                                      Over-the-counter Advil (ibuprofen) or Alleve (naproxen) can be harder on the kidney and gut so continue only minimally.   Try over-the-counter acetaminophen (Tylenol) would be a safety option 325-500 mg tablets, may take 1-2 tablets every 8 hour as needed.     Consider starting a calcium 600 mg + vitamin D 800 units once daily. -- you can look for multivitamin for women over 50+/senior.    Blood pressure:  No changes today but I do want get a better understand of you home blood pressure readings as you have a history of higher readings in clinic.  Please rest for 5 minutes, then check and repeat this 5 times. Check daily or as much as you once a day until our next call. Please write down the reading and the heart rate for our call.     Vaccines: covid booster    Follow-up: 8/17 phone call to review home blood pressure readings.     It was great speaking with you today.  I value your experience and would be very thankful for your time in providing feedback in our clinic survey. In the next few days, you may receive an email or text message from [employer] with a link to a survey related to your  clinical pharmacist.\"     To schedule another MTM appointment, please call the clinic directly or you may call the MTM scheduling line at [phone] or toll-free at [phone].     My Clinical Pharmacist's contact information:                                                      Please feel free to contact me with any questions or concerns you have.      Cheyenne Trevizo, PharmD  Medication Therapy Management Pharmacist    "

## 2022-08-18 ENCOUNTER — ANCILLARY PROCEDURE (OUTPATIENT)
Dept: MAMMOGRAPHY | Facility: CLINIC | Age: 67
End: 2022-08-18
Payer: COMMERCIAL

## 2022-08-18 ENCOUNTER — OFFICE VISIT (OUTPATIENT)
Dept: OPTOMETRY | Facility: CLINIC | Age: 67
End: 2022-08-18
Payer: COMMERCIAL

## 2022-08-18 DIAGNOSIS — Z12.31 VISIT FOR SCREENING MAMMOGRAM: ICD-10-CM

## 2022-08-18 DIAGNOSIS — H52.4 PRESBYOPIA: ICD-10-CM

## 2022-08-18 DIAGNOSIS — H21.302: ICD-10-CM

## 2022-08-18 DIAGNOSIS — H52.03 HYPEROPIA OF BOTH EYES: Primary | ICD-10-CM

## 2022-08-18 DIAGNOSIS — D31.31 NEVUS, CHOROIDAL, RIGHT: ICD-10-CM

## 2022-08-18 PROCEDURE — 92015 DETERMINE REFRACTIVE STATE: CPT | Performed by: OPTOMETRIST

## 2022-08-18 PROCEDURE — 77067 SCR MAMMO BI INCL CAD: CPT | Mod: TC | Performed by: RADIOLOGY

## 2022-08-18 PROCEDURE — 92014 COMPRE OPH EXAM EST PT 1/>: CPT | Performed by: OPTOMETRIST

## 2022-08-18 ASSESSMENT — TONOMETRY
IOP_METHOD: APPLANATION
OS_IOP_MMHG: 17
OD_IOP_MMHG: 17

## 2022-08-18 ASSESSMENT — REFRACTION_WEARINGRX
OS_SPHERE: +3.50
OS_CYLINDER: +0.50
SPECS_TYPE: BIFOCAL
OD_SPHERE: +2.00
OS_AXIS: 030
OD_CYLINDER: SPHERE
OD_ADD: +2.75
OS_ADD: +2.75

## 2022-08-18 ASSESSMENT — REFRACTION_MANIFEST
OS_CYLINDER: +0.75
OD_ADD: +2.75
METHOD_AUTOREFRACTION: 1
OD_CYLINDER: +0.25
OS_AXIS: 31
OS_CYLINDER: +0.50
OD_AXIS: 174
OS_AXIS: 030
OS_ADD: +2.75
OS_SPHERE: +3.50
OD_SPHERE: +2.50
OD_SPHERE: +2.00
OD_CYLINDER: SPHERE
OS_SPHERE: +3.75

## 2022-08-18 ASSESSMENT — CONF VISUAL FIELD
OD_NORMAL: 1
METHOD: COUNTING FINGERS
OS_NORMAL: 1

## 2022-08-18 ASSESSMENT — VISUAL ACUITY
OS_CC+: -1
CORRECTION_TYPE: GLASSES
OS_CC: 20/20
OD_CC+: -1
OD_CC: 20/30
OD_CC: 20/20
OS_SC: 20/300
OD_SC: 20/70
OS_CC: 20/40-2
METHOD: SNELLEN - LINEAR

## 2022-08-18 ASSESSMENT — EXTERNAL EXAM - RIGHT EYE: OD_EXAM: NORMAL

## 2022-08-18 ASSESSMENT — SLIT LAMP EXAM - LIDS
COMMENTS: NORMAL
COMMENTS: NORMAL

## 2022-08-18 ASSESSMENT — CUP TO DISC RATIO
OD_RATIO: 0.5
OS_RATIO: 0.5

## 2022-08-18 ASSESSMENT — EXTERNAL EXAM - LEFT EYE: OS_EXAM: NORMAL

## 2022-08-18 NOTE — LETTER
8/18/2022         RE: Rajwinder Lama  [address]  [place] [address]        Dear Colleague,    Thank you for referring your patient, Rajwinder Lama, to the [facility] [place]. Please see a copy of my visit note below.    Chief Complaint   Patient presents with     Annual Eye Exam      Accompanied by son and  on the phone    Last Eye Exam: 09/25/2019  Dilated Previously: Yes, side effects of dilation explained today    What are you currently using to see?  glasses       Distance Vision Acuity: Satisfied with vision    Near Vision Acuity: Satisfied with vision while reading and using computer with glasses    Eye Comfort: good  Do you use eye drops? : No      Cora Zuritaor - Optometric Assistant       History of cyst of left eye since a child    Medical, surgical and family histories reviewed and updated 8/18/2022.       OBJECTIVE: See Ophthalmology exam    ASSESSMENT:    ICD-10-CM    1. Hyperopia of both eyes  H52.03    2. Presbyopia  H52.4    3. Cyst of iris margin, left  H21.302    4. 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        Sincerely,        Rin Jameson, OD

## 2022-08-18 NOTE — PROGRESS NOTES
Chief Complaint   Patient presents with     Annual Eye Exam      Accompanied by son and  on the phone    Last Eye Exam: 09/25/2019  Dilated Previously: Yes, side effects of dilation explained today    What are you currently using to see?  glasses       Distance Vision Acuity: Satisfied with vision    Near Vision Acuity: Satisfied with vision while reading and using computer with glasses    Eye Comfort: good  Do you use eye drops? : No      Cora Shanor - Optometric Assistant       History of cyst of left eye since a child    Medical, surgical and family histories reviewed and updated 8/18/2022.       OBJECTIVE: See Ophthalmology exam    ASSESSMENT:    ICD-10-CM    1. Hyperopia of both eyes  H52.03    2. Presbyopia  H52.4    3. Cyst of iris margin, left  H21.302    4. 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

## 2022-09-17 ENCOUNTER — HEALTH MAINTENANCE LETTER (OUTPATIENT)
Age: 67
End: 2022-09-17

## 2022-09-21 ENCOUNTER — APPOINTMENT (OUTPATIENT)
Dept: OPTOMETRY | Facility: CLINIC | Age: 67
End: 2022-09-21
Payer: COMMERCIAL

## 2022-09-21 PROCEDURE — 92341 FIT SPECTACLES BIFOCAL: CPT | Performed by: OPTOMETRIST

## 2022-12-27 ENCOUNTER — PATIENT OUTREACH (OUTPATIENT)
Dept: PEDIATRICS | Facility: CLINIC | Age: 67
End: 2022-12-27

## 2022-12-27 NOTE — TELEPHONE ENCOUNTER
- See 12/27/2022 SB3 Welcome Letter    - Submitted a translation request to translation services   - Once writer receives the interpreted letter, writer will send the translated SB3 Welcome Letter to the patient   - Awaiting the translated letter at this time     Paloma BRITTON RN   Patient Advocate Liaison (PAL)  [facility]  [phone]

## 2022-12-27 NOTE — LETTER
Asnakech F Degaga  [address]  [place] [address]      ????    ??? ?????? ?????[facility] ? ???? ?????? ?????????    [facility] ?????? ??? ??? ?????? ?? ??? ???? ?? ? [facility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email or web address] ? ????? ?????? ??? ??? ?????? ?????? ???? ??? ???????? ???? ???? ?????? ???????      ??? ????? ??? ????? - ???? ??? ?????? ??????? ??? ?? ????? ??? ??? ??? ??? ?????? ??? ???????      ????? ???? ??? ????? - ??? ??? ?? ???? ??? ????? ?? ?????? ???? ??? ???????      ?????? ??? ??? ?????? ????? ??? ??????? ??? ?????? ?? ?????? - ?? ?????? ????????? ?????? ?????? ?? ?????? ??? ??? ????? ??? ???? ?? ??????      ??? ??????? ???? ???????? ???? ???? ?? ???? ????? ?????? ??? ?? ???      ????? ????? ??? ?????? ?????? ????? ?? ?????? ????? ?? ??? ?????? ??? ?? ??????    ???? ???? ?? ????? ???? (PAL)    ??????? ??? ?????? ??????? ??? ??? ???? ???? ???? ?? ????? ??????? (PALs) ??? ?????? ???? ??? ?????? ???? ???? ?? ????? (PAL) ????? ? ?????? ??? ?????? ??? ????? ?????? ????? ??????? ????? ?????????? ?????? ??????? ????? ??? ?????? ???? ????? ??? ??? ??? ?????? ????? ?? ??????? ???? ?? ???????? ???? ????? ? PAL ???? ??  ???????    ??????? ???? ???? ????? ??? ?? ??? ?????? ??? ?????? ???    ???? ?????? ??? ?????? ????? ?? ??????? ??? ?? ??? ??? ?? ???? ???? ????? ?? ???? ??????? ??? ??????? ???? ???? - ?? ? Medication Therapy Management (MTM? ?????? ??? ??????) ???????? ???? ??? ???????? ???? ?? ????? ?????? ??? ??????? ?????? ????? ????? ????? ??? ?????? ?? ?????? ???? ?? ???? ??? ??? ?????? ?????    ??? ?????? ?????? ?? ??? ?????? ???? ?????? ??????    MyChart ??? ?????? ????? ???? ??? ?????? ???? ?? ?????? ??? ?????? ?????? ?????? ???    MyChart ??????? ???? ??????? ????????      ?????? ???? ?? ???????? ??? ??? ??? ????? ????? ????? ???????      ?? ??? ?????? ???? ??????? ????? ??????? ????? ???????      ????? ??? ?? ????? ???????? ????? ???????      ?????? ?????      ??????? ??? ????? eCheck-in (???????? ????) ??????? ?? ?????? ????? ???????      e-visit (???????? ??? ????) ????? ??? ?????? ?????? ????? ??? ?????? ?????? ?????? ???????      ?????? ??? ????? ?? ??????    [email or web address] ?? ?????:: ??? ????? ??? ??? ???? ???? ????? ????????? ???? ?????    ??? ?? ??? ??? ??? ?????? ?????? ?? ?????    ??? ?? ??? ??? ??? ?????? ?????? ??????? - ?????? ????? ??? ???? ???? ??? ?????      ??????? ??? (???)????? ???? ??? ??? ?????? ????? ??? ?? ??????? ??? ??? ??? ?????? ????? ?????? ?? ???? ???? ?????? ????? ???? ??????/????? ??? ???? ??????? ??? ???? ????? ??? ?? ???? ???? ?????? ????? ????? ????? ???? ?????? ????? ?? ????? ???? (Medicare Wellness) ???? ??????? ??? ??????? ???      ???? ?????- ??? ?????? ?????? ????? ?? ??? ?? ???? ?????? ??? ????? ????? ?????? ????? ?? ?? ??? ???? ???      E-visit (???????? ??? ????)? ????? ??? ???? ??? ?????? ???????? ??? ??? ??????? ?????? ??? ?? ??? ????? MyChart ????? E-visit ???? ???? ???? ??? ?????? ?????? ????? ???? ??? ?? ??? ??? ???????    Paloma BRITTON, RN    ???? ???? ?? ????? ???? (PAL)    [facility]    [phone]

## 2022-12-30 NOTE — TELEPHONE ENCOUNTER
Received the translated SB3 Welcome Letter     SB3 Welcome Letter sent.     Paloma BRITTON RN   Patient Advocate Liaison (PAL)  [facility]  [phone]

## 2023-03-13 DIAGNOSIS — I10 BENIGN ESSENTIAL HYPERTENSION: ICD-10-CM

## 2023-03-14 RX ORDER — LISINOPRIL AND HYDROCHLOROTHIAZIDE 12.5; 2 MG/1; MG/1
1 TABLET ORAL DAILY
Qty: 90 TABLET | Refills: 0 | Status: SHIPPED | OUTPATIENT
Start: 2023-03-14 | End: 2023-07-07

## 2023-03-14 NOTE — TELEPHONE ENCOUNTER
Prescription approved per [facility] Refill Protocol.    Paloma BRITTON RN   [facility] [place]

## 2023-05-09 PROBLEM — D06.9 SEVERE DYSPLASIA OF CERVIX (CIN III): Status: ACTIVE | Noted: 2017-08-01

## 2023-05-16 ENCOUNTER — OFFICE VISIT (OUTPATIENT)
Dept: PEDIATRICS | Facility: CLINIC | Age: 68
End: 2023-05-16
Payer: COMMERCIAL

## 2023-05-16 VITALS
RESPIRATION RATE: 16 BRPM | WEIGHT: 182.8 LBS | TEMPERATURE: 97.4 F | BODY MASS INDEX: 33.64 KG/M2 | SYSTOLIC BLOOD PRESSURE: 148 MMHG | HEIGHT: 62 IN | OXYGEN SATURATION: 97 % | DIASTOLIC BLOOD PRESSURE: 64 MMHG | HEART RATE: 59 BPM

## 2023-05-16 DIAGNOSIS — E66.811 CLASS 1 OBESITY DUE TO EXCESS CALORIES WITH SERIOUS COMORBIDITY AND BODY MASS INDEX (BMI) OF 33.0 TO 33.9 IN ADULT: ICD-10-CM

## 2023-05-16 DIAGNOSIS — Z01.419 WELL WOMAN EXAM: Primary | ICD-10-CM

## 2023-05-16 DIAGNOSIS — I10 BENIGN ESSENTIAL HYPERTENSION: ICD-10-CM

## 2023-05-16 DIAGNOSIS — E66.09 CLASS 1 OBESITY DUE TO EXCESS CALORIES WITH SERIOUS COMORBIDITY AND BODY MASS INDEX (BMI) OF 33.0 TO 33.9 IN ADULT: ICD-10-CM

## 2023-05-16 DIAGNOSIS — Z12.4 CERVICAL CANCER SCREENING: ICD-10-CM

## 2023-05-16 LAB
ANION GAP SERPL CALCULATED.3IONS-SCNC: 10 MMOL/L (ref 7–15)
BUN SERPL-MCNC: 14.7 MG/DL (ref 8–23)
CALCIUM SERPL-MCNC: 9.5 MG/DL (ref 8.8–10.2)
CHLORIDE SERPL-SCNC: 106 MMOL/L (ref 98–107)
CREAT SERPL-MCNC: 0.58 MG/DL (ref 0.51–0.95)
DEPRECATED HCO3 PLAS-SCNC: 28 MMOL/L (ref 22–29)
GFR SERPL CREATININE-BSD FRML MDRD: >90 ML/MIN/1.73M2
GLUCOSE SERPL-MCNC: 98 MG/DL (ref 70–99)
POTASSIUM SERPL-SCNC: 4.1 MMOL/L (ref 3.4–5.3)
SODIUM SERPL-SCNC: 144 MMOL/L (ref 136–145)

## 2023-05-16 PROCEDURE — 87624 HPV HI-RISK TYP POOLED RSLT: CPT | Performed by: PHYSICIAN ASSISTANT

## 2023-05-16 PROCEDURE — 36415 COLL VENOUS BLD VENIPUNCTURE: CPT | Performed by: PHYSICIAN ASSISTANT

## 2023-05-16 PROCEDURE — 80048 BASIC METABOLIC PNL TOTAL CA: CPT | Performed by: PHYSICIAN ASSISTANT

## 2023-05-16 PROCEDURE — 99397 PER PM REEVAL EST PAT 65+ YR: CPT | Performed by: PHYSICIAN ASSISTANT

## 2023-05-16 PROCEDURE — G0123 SCREEN CERV/VAG THIN LAYER: HCPCS | Performed by: PHYSICIAN ASSISTANT

## 2023-05-16 ASSESSMENT — ENCOUNTER SYMPTOMS
NERVOUS/ANXIOUS: 0
PARESTHESIAS: 0
NAUSEA: 0
CHILLS: 0
EYE PAIN: 1
HEMATURIA: 0
FEVER: 0
DIARRHEA: 0
PALPITATIONS: 0
CONSTIPATION: 0
COUGH: 0
DYSURIA: 0
HEARTBURN: 0
SORE THROAT: 0
DIZZINESS: 0
HEADACHES: 0
WEAKNESS: 0
HEMATOCHEZIA: 0
JOINT SWELLING: 0
ARTHRALGIAS: 0
FREQUENCY: 0
SHORTNESS OF BREATH: 0
MYALGIAS: 0
ABDOMINAL PAIN: 0

## 2023-05-16 ASSESSMENT — ACTIVITIES OF DAILY LIVING (ADL)
CURRENT_FUNCTION: MONEY MANAGEMENT REQUIRES ASSISTANCE
CURRENT_FUNCTION: SHOPPING REQUIRES ASSISTANCE
CURRENT_FUNCTION: LAUNDRY REQUIRES ASSISTANCE
CURRENT_FUNCTION: TRANSPORTATION REQUIRES ASSISTANCE

## 2023-05-16 ASSESSMENT — PAIN SCALES - GENERAL: PAINLEVEL: NO PAIN (0)

## 2023-05-16 NOTE — PROGRESS NOTES
"SUBJECTIVE:   Rajwinder is a 68 year old who presents for Preventive Visit.      5/16/2023     9:14 AM   Additional Questions   Roomed by vipul   Accompanied by daughter andres         5/16/2023     9:14 AM   Patient Reported Additional Medications   Patient reports taking the following new medications na     Patient has been advised of split billing requirements and indicates understanding: Yes  Are you in the first 12 months of your Medicare coverage?  No    Healthy Habits:     In general, how would you rate your overall health?  Good    Frequency of exercise:  1 day/week    Duration of exercise:  45-60 minutes    Do you usually eat at least 4 servings of fruit and vegetables a day, include whole grains    & fiber and avoid regularly eating high fat or \"junk\" foods?  Yes    Taking medications regularly:  Not Applicable    Medication side effects:  Not applicable    Ability to successfully perform activities of daily living:  Transportation requires assistance, shopping requires assistance, laundry requires assistance and money management requires assistance    Home Safety:  No safety concerns identified    Hearing Impairment:  No hearing concerns    In the past 6 months, have you been bothered by leaking of urine?  No    In general, how would you rate your overall mental or emotional health?  Good      PHQ-2 Total Score: 1    Additional concerns today:  No      Have you ever done Advance Care Planning? (For example, a Health Directive, POLST, or a discussion with a medical provider or your loved ones about your wishes): No, advance care planning information given to patient to review.  Patient declined advance care planning discussion at this time.       Fall risk  Fallen 2 or more times in the past year?: No  Any fall with injury in the past year?: No    Cognitive Screening   1) Repeat 3 items (Leader, Season, Table)    2) Clock draw: ABNORMAL incorrect time   3) 3 item recall: Recalls NO objects   Results: 0 " items recalled: PROBABLE COGNITIVE IMPAIRMENT, **INFORM PROVIDER**    Mini-CogTM Copyright JULIÁN Emerson. Licensed by the author for use in [facility]; reprinted with permission ([email or web address]). All rights reserved.      Do you have sleep apnea, excessive snoring or daytime drowsiness?: no    Reviewed and updated as needed this visit by clinical staff   Tobacco  Allergies  Meds              Reviewed and updated as needed this visit by Provider                 Social History     Tobacco Use     Smoking status: Never     Smokeless tobacco: Never   Vaping Use     Vaping status: Never Used   Substance Use Topics     Alcohol use: Not Currently     Comment: ethipian drink rarely             4/12/2022     9:59 PM   Alcohol Use   Prescreen: >3 drinks/day or >7 drinks/week? No     Do you have a current opioid prescription? No  Do you use any other controlled substances or medications that are not prescribed by a provider? None          Patient Care Team:  [place] Payne Mai, MD as PCP - General (Internal Medicine)  Lupillo Lincoln MD as Assigned OBGYN Provider  Rin Jameson OD as Assigned Surgical Provider  Cheyenne Trevizo [facility] as Assigned [facility] Pharmacist  Paloma Adam RN as Personal Advocate & Liaison (PAL)  [place] Payne Mai, MD as Assigned PCP    The following health maintenance items are reviewed in Epic and correct as of today:  Health Maintenance   Topic Date Due     ZOSTER IMMUNIZATION (1 of 2) Never done     ANNUAL REVIEW OF HM ORDERS  08/25/2021     Pneumococcal Vaccine: 65+ Years (2 - PPSV23 if available, else PCV20) 08/25/2021     INFLUENZA VACCINE (1) 09/01/2022     COVID-19 Vaccine (5 - Moderna series) 09/30/2022     PHQ-2 (once per calendar year)  01/01/2023     MEDICARE ANNUAL WELLNESS VISIT  04/13/2023     BMP  04/13/2023     FALL RISK ASSESSMENT  04/13/2023     PAP FOLLOW-UP  05/06/2023     HPV FOLLOW-UP  05/06/2023     MAMMO SCREENING  08/18/2024     LIPID   "04/13/2027     ADVANCE CARE PLANNING  04/18/2027     DTAP/TDAP/TD IMMUNIZATION (2 - Td or Tdap) 06/01/2027     DEXA  02/12/2034     HEPATITIS C SCREENING  Completed     IPV IMMUNIZATION  Aged Out     MENINGITIS IMMUNIZATION  Aged Out     COLORECTAL CANCER SCREENING  Discontinued     Lab work is in process      FHS-7:       8/18/2022    12:17 PM   Breast CA Risk Assessment (FHS-7)   Did any of your first-degree relatives have breast or ovarian cancer? No   Did any of your relatives have bilateral breast cancer? No   Did any man in your family have breast cancer? No   Did any woman in your family have breast and ovarian cancer? No   Did any woman in your family have breast cancer before age 50 y? No   Do you have 2 or more relatives with breast and/or ovarian cancer? No       Mammogram Screening: Recommended mammography every 1-2 years with patient discussion and risk factor consideration  Pertinent mammograms are reviewed under the imaging tab.    Review of Systems   Constitutional: Negative for chills and fever.   HENT: Negative for congestion, ear pain, hearing loss and sore throat.    Eyes: Positive for pain. Negative for visual disturbance.   Respiratory: Negative for cough and shortness of breath.    Cardiovascular: Negative for chest pain, palpitations and peripheral edema.   Gastrointestinal: Negative for abdominal pain, constipation, diarrhea, heartburn, hematochezia and nausea.   Genitourinary: Negative for dysuria, frequency, genital sores, hematuria and urgency.   Musculoskeletal: Negative for arthralgias, joint swelling and myalgias.   Skin: Negative for rash.   Neurological: Negative for dizziness, weakness, headaches and paresthesias.   Psychiatric/Behavioral: Negative for mood changes. The patient is not nervous/anxious.          OBJECTIVE:   BP (!) 152/80   Pulse 59   Temp 97.4  F (36.3  C) (Tympanic)   Resp 16   Ht 1.562 m (5' 1.5\")   Wt 82.9 kg (182 lb 12.8 oz)   SpO2 97%   BMI 33.98 kg/m   " "Estimated body mass index is 32.36 kg/m  as calculated from the following:    Height as of 4/13/22: 1.562 m (5' 1.5\").    Weight as of 8/5/22: 79 kg (174 lb 1.6 oz).  Physical Exam  GENERAL: healthy, alert and no distress  EYES: Eyes grossly normal to inspection, PERRL and conjunctivae and sclerae normal  HENT: ear canals and TM's normal, nose and mouth without ulcers or lesions  NECK: no adenopathy, no asymmetry, masses, or scars and thyroid normal to palpation  RESP: lungs clear to auscultation - no rales, rhonchi or wheezes  CV: regular rate and rhythm, normal S1 S2, no S3 or S4, no murmur, click or rub, no peripheral edema and peripheral pulses strong  ABDOMEN: soft, nontender, no hepatosplenomegaly, no masses and bowel sounds normal   (female): normal female external genitalia, normal urethral meatus, vaginal mucosa pink, moist, well rugated, and normal cervix/adnexa/uterus without masses or discharge  MS: no gross musculoskeletal defects noted, no edema  SKIN: no suspicious lesions or rashes  NEURO: Normal strength and tone, mentation intact and speech normal  PSYCH: mentation appears normal, affect normal/bright    Diagnostic Test Results:  Labs reviewed in Epic    ASSESSMENT / PLAN:      Diagnosis Comments   1. Well woman exam  Follow up annual      2. Benign essential hypertension  BASIC METABOLIC PANEL   bp elevated. Recheck with nurse visit two weeks. If elevated still will need medication adjustment      3. Class 1 obesity due to excess calories with serious comorbidity and body mass index (BMI) of 33.0 to 33.9 in adult  Weight loss would help with bp control.  Work with movement 30 min daily, increase veggies, eat lean meat, avoid fried fatty.         4. Cervical cancer screening  Pap Screen with HPV - recommended age 30 - 65 years     history of ZO 1 Leep 2022                COUNSELING:  Reviewed preventive health counseling, as reflected in patient instructions  Special attention given to:       " Regular exercise       Healthy diet/nutrition        She reports that she has never smoked. She has never used smokeless tobacco.      Appropriate preventive services were discussed with this patient, including applicable screening as appropriate for cardiovascular disease, diabetes, osteopenia/osteoporosis, and glaucoma.  As appropriate for age/gender, discussed screening for colorectal cancer, prostate cancer, breast cancer, and cervical cancer. Checklist reviewing preventive services available has been given to the patient.    Reviewed patients plan of care and provided an AVS. The Basic Care Plan (routine screening as documented in Health Maintenance) for Rajwinder meets the Care Plan requirement. This Care Plan has been established and reviewed with the Patient.          Pamela Melchor PA-C  [facility] [place]    Identified Health Risks:    I have reviewed Opioid Use Disorder and Substance Use Disorder risk factors and made any needed referrals.

## 2023-05-23 ENCOUNTER — PATIENT OUTREACH (OUTPATIENT)
Dept: PEDIATRICS | Facility: CLINIC | Age: 68
End: 2023-05-23
Payer: COMMERCIAL

## 2023-05-23 LAB
HUMAN PAPILLOMA VIRUS 16 DNA: NEGATIVE
HUMAN PAPILLOMA VIRUS 18 DNA: NEGATIVE
HUMAN PAPILLOMA VIRUS FINAL DIAGNOSIS: ABNORMAL
HUMAN PAPILLOMA VIRUS OTHER HR: POSITIVE

## 2023-06-28 ENCOUNTER — OFFICE VISIT (OUTPATIENT)
Dept: OBGYN | Facility: CLINIC | Age: 68
End: 2023-06-28
Payer: COMMERCIAL

## 2023-06-28 ENCOUNTER — PATIENT OUTREACH (OUTPATIENT)
Dept: ONCOLOGY | Facility: CLINIC | Age: 68
End: 2023-06-28

## 2023-06-28 VITALS
BODY MASS INDEX: 33.99 KG/M2 | SYSTOLIC BLOOD PRESSURE: 168 MMHG | HEIGHT: 61 IN | DIASTOLIC BLOOD PRESSURE: 90 MMHG | WEIGHT: 180 LBS

## 2023-06-28 DIAGNOSIS — R87.810 CERVICAL HIGH RISK HPV (HUMAN PAPILLOMAVIRUS) TEST POSITIVE: Primary | ICD-10-CM

## 2023-06-28 DIAGNOSIS — D06.9 SEVERE DYSPLASIA OF CERVIX (CIN III): ICD-10-CM

## 2023-06-28 DIAGNOSIS — N88.2 CERVICAL STENOSIS (UTERINE CERVIX): ICD-10-CM

## 2023-06-28 PROCEDURE — 57452 EXAM OF CERVIX W/SCOPE: CPT | Performed by: OBSTETRICS & GYNECOLOGY

## 2023-06-28 NOTE — PROGRESS NOTES
Colposcopy Note    Past History:     No LMP recorded. Patient is postmenopausal.  Patient is not pregnant.     Previous Abnormal Pap Hx:  17 NIL, +HPV 16. Plan colp  12/15/17 [facility] [facility]- ZO 2-3.  Plan: LEEP due by 3/15/18  2/14/18 LEEP- negative. Plan for 1 yr co-test.  19: NIL pap, + HR HPV type 16 result. Plan [facility] [facility].    19 [place] [facility]- Negative. Plan 1 yr co-test  21 Lost to follow-up for pap tracking  22 NIL, + HR HPV 16 & other. Plan: colposcopy by 22 [facility] [facility] - ZO 1. Plan cotest in 1 year.   23 NIL Pap, + HR HPV (neg 16/18).     Indications:  Encounter Diagnoses   Name Primary?     Cervical high risk HPV (human papillomavirus) test positive Yes     Severe dysplasia of cervix (ZO III)      Cervical stenosis (uterine cervix)        PROCEDURE:  The procedure was explained, and informed consent obtained. The patient was placed in the dorsal lithotomy position. A vaginal speculum was placed. A GC/Chlamydia culture was not obtained. Dilute acetic acid was applied to cervix. The exocervix was visualized. The transformation zone was visualized satisfactorily. Colposcopy was done with white light and with the Nilo light. The Cervix is markedly attenuated and almost flush with the vaginal apex. The external os is barely visible and severely stenotic.  The SCJ is not able to be visualized.  No definite AWE seen on ectoCx. Colposcopy of vagina revealed: atrophy. The patient tolerated the procedure well. At the completion of the procedure, only scant bleeding was present. Hemostasis was achieved with Monsel's solution.    FINDINGS:  Markedly attenuated and stenotic cervix.  Inadequate colposcopy        ASSESSMENT:  Encounter Diagnoses   Name Primary?     Cervical high risk HPV (human papillomavirus) test positive Yes     Severe dysplasia of cervix (ZO III)      Cervical stenosis (uterine cervix)        PLAN:  Will refer to Gyn Onc Dysplasia clinic for further follow-up due to  markedly attenuated cervix and severely stenotic os such that colposcopy cannot be done adequately.      Shaw Trevizo MD

## 2023-06-28 NOTE — NURSING NOTE
"Chief Complaint   Patient presents with     Colposcopy     +Other HPV        Initial BP (!) 168/90 (BP Location: Right arm, Patient Position: Sitting, Cuff Size: Adult Large)   Ht 1.549 m (5' 1\")   Wt 81.6 kg (180 lb)   BMI 34.01 kg/m   Estimated body mass index is 34.01 kg/m  as calculated from the following:    Height as of this encounter: 1.549 m (5' 1\").    Weight as of this encounter: 81.6 kg (180 lb).  BP completed using cuff size: large    Questioned patient about current smoking habits.  Pt. has never smoked.          The following HM Due: NONE    Manuel Jameson CMA                "

## 2023-06-28 NOTE — PROGRESS NOTES
"New Patient Hematology / Oncology Nurse Navigator Note     Referral Date: 6/28/23    Referring provider:   Shaw Trevizo  [address]   [facility] [address]   Phone: [phone]   Fax: [phone]       Referring Clinic/Organization: [facility]     Referred to: GynOnc- Dysplasia Clinic     Requested provider (if applicable): Dr. [facility]     Evaluation for : \"HX severe dysplaisa s/p leeep persistent HR HPV- very attenuated cervix\"     Clinical History (per Nurse review of records provided):      5/16/23 PAP/HPV -- BOOKMARKED  Component Ref Range & Units      Other HR HPV Negative Positive Abnormal      HPV16 DNA Negative Negative     HPV18 DNA Negative Negative     FINAL DIAGNOSIS       Interpretation   Negative for Intraepithelial Lesion or Malignancy (NILM)         Office Visit today with OBGYN:   \"PLAN:  Will refer to Gyn Onc Dysplasia clinic for further follow-up due to markedly attenuated cervix and severely stenotic os such that colposcopy cannot be done adequately.\"-- BOOKMARKED    Clinical Assessment / Barriers to Care (Per Nurse):  Pt lives in [facility]    Records Location: [facility]     Records Needed:   N/A    Additional testing needed prior to consult:   N/A    Referral updates and Plan:   Referral received by nursing, chart reviewed, patient contacted and schedueld  7/6 2PM with Dr. [facility] at [facility], BSN, RN, PHN, OCN  Hematology/Oncology Nurse Navigator  [facility]  [phone]      "

## 2023-07-03 NOTE — PATIENT INSTRUCTIONS
SCHEDULING:  -See surgical scheduling below.  -RTC 2-4 weeks postop (MD, in-person).       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  Treatment History:  -2/14/18: LEEP. No residual dysplasia.      PLAN:  1) hrHPV+: We will notify you of pathology results via "[employer]hart.   -If pathology HSIL: Return to clinic to discuss repeat excisional procedure prior to hysterectomy.  -If pathology LSIL or negative: Plan for hysterectomy without additional testing.   -If endocervical sampling insufficient: Will get pelvic MRI prior to planned hysterectomy.      PLAN:  1.  SURGERY Monday in September (date to be determined) Surgery to be performed at [facility]: [address] [place]  [address]:  Pelvic exam under anesthesia, total laparoscopic hysterectomy, bilateral salpingo-oophorectomy.     2.  1) Preoperative labs:  CBC (blood count), type and screen (blood type). 2)  Preoperative chest xray, EKG.      3.  PREOPERATIVE EVALUATION:  Please schedule a visit with your primary care provider for preoperative evaluation within 30 days of surgery.     4.  ONE WEEK BEFORE SURGERY:  Please do not take aspirin, ibuprofen, or fish oil, as these drugs can increase your intraoperative bleeding risk; tylenol is okay to take for pain.    5.  HOSPITAL STAY:  You will be discharged to home the day of surgery; you will need someone to drive you home.      6.  FOLLOW-UP:  Return to the gyn onc clinic approximately 2-4 weeks after surgery for your postoperative evaluation.         POSTOP INSTRUCTIONS:  You will be discharged with ibuprofen 600 mg, which should be taken every 6 hours for the first few days after surgery, then wean off as tolerated.  You can alternate ibuprofen with acetaminophen (tylenol), with a maximum of 4g in 1 day.   You will also be discharged with a narcotic pain medication (e.g. oxycodone). This can be used in  addition to ibuprofen if needed; approximately 50% need to take a few narcotic pain tablets, 50% do not need any narcotics. Wean off this medication first. Common side effects include itching, nausea, constipation, fatigue.     Take a stool softener such as senna or colace twice daily after surgery, unless you develop diarrhea.     Decreased appetite after surgery is normal. Often 6-8 very small meals per day is better tolerated than 2-3 large meals. Decreased appetite can last for up to 4-6 months after surgery.    I encourage you to walk and take the stairs. No heavy lifting: Do not lift >10 pounds for the first 4 weeks, then no more than 20 pounds for the next 4 weeks, then can increase lifting as tolerated.    Swelling of the legs/buttocks is normal for 4-6 weeks. It will eventually decrease. Please call if you have increasing pain, especially in one leg more than the other, or if one leg is significantly more swollen than the other.    You may shower the day after surgery, just let the soap and water run over the incision(s) and pat dry. No sitting in a bathtub for 4 weeks.    No driving while taking narcotic pain medications. After you discontinue the narcotic, sit in your car and make sure you can move your foot from the gas to the break without flinching prior to driving (~1-4 weeks after surgery).     If hysterectomy performed: you may have some vaginal spotting as the top of the vagina heals, and this is normal. If you have heavy bleeding like a moderate to heavy period, or have large gushes of fluid, please call the clinic.    Please call if you have fevers 101 or greater, heavy vaginal bleeding (see above), persistent nausea/vomiting, purulent discharge and/or increasing redness at the incision sites, or other problems or concerns.   You should feel a little bit better every day; call if you start developing more pain, or more nausea.      Activity Restrictions:  -No heavy lifting (>10-20 pounds) for 8  weeks after surgery.  -Nothing in the vagina (no tampons, no douching, no sex) for 8 weeks after surgery.  -No driving while taking narcotic pain medications.  After you discontinue the narcotic, sit in your car and make sure you can move your foot from the jonathon to the brake without flinching prior to driving (approximately 1 week after surgery).      Please call if you have any questions or concerns. [phone]       Francesca Emerson MD, MS, FACOG, FACS  7/6/2023  2:42 PM

## 2023-07-03 NOTE — PROGRESS NOTES
Consult Note on Referred Patient  Gynecologic Oncology HPV Clinic  [facility]      Date: 2023       Referring Provider/Gynecologist:  Dr. Shaw Trevizo MD  [address] [place] [address]       Primary Care Provider:  Bismark Payne Mai  [address] [place] [address]       RE: Rajwinder Lama  : 1955  SACHIN: 2023      Reason for visit: History of cervical HSIL. Persistent hrHPV, inability to sample the endocervix.       History of Present Illness:  Rajwinder Lama (she/her/hers) is a 68 year old Portuguese-speaking patient referred to the Gynecologic Oncology HPV Clinic for evaluation of persistent hrHPV+ cervical cancer screening, inability to sample the endocervix. History as follows:    *HPV vaccination status: unvaccinated    17: Pap test NILM, hrHPV16+  12/15/17: Ectocervical biopsies 5:00, 7:00, 11:00 and endocervical curettings pathology  HSIL (CIN2-3).  18: LEEP.   -Pathology: Negative for residual HSIL.     19: Pap test NILM, hrHPV16+.   19: Cervical biopsy 3:00 pathology negative for dysplasia/malignancy; Endocervical cuerttings insufficient for evaluation.    22: Pap test NILM, hrHPV16+, non-16/18 hrHPV+.   22: Endocervical curettings pathology LSIL (CIN1), although definitive endocervical mucosa not identified.     23: Pap test NILM, non-16/18 hrHPV+.       Subjective:  Rajwinder presents to the gyn onc HPV clinic today for a scheduled consultation, accompanied by her daughter and son-in-law.   Rajwinder reports no concerning symptoms. No vaginal bleeding nor discharge.       Review of Systems:   ROS: 10 point ROS neg other than the symptoms noted above in the HPI.       Past Medical History:  Past Medical History:   Diagnosis Date     ZO III (cervical intraepithelial neoplasia III) 12/15/2017     Hypertension     ((Pt Qnr Sub: patient does have hypertension))        Past Surgical History:  Past Surgical History:   Procedure  Laterality Date     CYSTECTOMY OVARIAN BENIGN N/A      gall stone      gallbladder remains     LEEP TX, CERVICAL  02/14/2018    For cervical HSIL       Current Medications:   Current Outpatient Medications   Medication     Cholecalciferol (VITAMIN D PO)     lisinopril-hydrochlorothiazide (ZESTORETIC) 20-12.5 MG tablet     No current facility-administered medications for this visit.        Allergies:   Allergies   Allergen Reactions     Seasonal Allergies          Social History:  Patient lives with her daughter and son-in-law. Works at [employer]. No activity limitations. She does not have Advanced Directives, but has identified her daughter Josefina Lange as her desired Healthcare Power of .    Social History     Tobacco Use     Smoking status: Never     Smokeless tobacco: Never   Substance Use Topics     Alcohol use: Not Currently     Comment: ethipian drink rarely       History   Drug Use No       Family History:   No known family history of breast, ovarian, uterine, colon, urothelial/renal, prostate or pancreatic cancers.  No known family history of melanoma.   Family History   Problem Relation Age of Onset     Coronary Artery Disease Mother      Hypertension Mother      Hypertension Father        Physical Exam:   BP (!) 170/76 (BP Location: Left arm, Patient Position: Sitting, Cuff Size: Adult Large)   Pulse 69   Temp 98  F (36.7  C) (Oral)   Resp 20   SpO2 100%   There is no height or weight on file to calculate BMI.    General Appearance: healthy and alert, no distress     Musculoskeletal: extremities non tender and without edema    Skin: no lesions or rashes     Neurological: normal gait, no gross defects     Psychiatric: appropriate mood and affect                               Genitourinary: External genitalia and urethral meatus appears normal.  Vagina is without lesions.  Cervix flush with the top of the vagina and scarred in appearance, with scarred external os visible.        Procedure Risk Statement:   The patient was counseled regarding risks, benefits and alternatives to colposcopy, possible biopsies.  Risks discussed include but not limited to:  Bleeding; infection; injury to adjacent organs; inadequate sample or false negative result.  The patient's questions were answered, and informed consent signed.       Procedure:   After the informed consent was signed and time-out procedure was performed, dilute acetic acid was applied to the cervix. Colposcopy performed.  -Squamocolumnar junction fully visualized? No  -Acetowhite changes? No  -Punctations, mosaicism, abnormal vasculature, other abnormalities? No  -Clinical Impression: Normal/scarred  -Specimens: Endocervical curettings   A single tooth tenaculum was placed on the anterior lip of the cervix to provided traction. A stab incision was made at the os using a #11 scalpel. Sharp endocervical curettage followed by endocervical brushing performed. Hemostasis at the os achieved with application of silver nitrate.       Laboratory Examination:  5/16/23 BMP: Creatinine 0.58.       Performance Status:  ECOG Grade 0.       Assessment:  Rajwinder Lama (she/her/hers)  is a 68 year old Spanish-speaking patient with a history of cervical HSIL 2017 with persistent hrHPV+ cervical cancer screening and inability to sample the endocervix, clinical impression normal/scarred, pathology pending.     A total of 45 minutes was spent with the patient, 30 minutes of which were spent in counseling the patient and/or treatment planning, 7 minutes of which were spent in the procedure above; an additional 5 minutes was spent in chart review/documentation.      Plan:      1.)    HrHPV+: I reviewed and confirmed the history above with the patient.   We will notify her of the pathology results via [employer].  If endocervical sampling insufficient: Discussed that the colposcopic standards state endocervical sampling can be omitted in the setting of  cervical stenosis which would require a surgical procedure to obtain a sample (Elba TRIVEDI, et al. Colposcopy Standards: Guidelines for endocervical curettage at colposcopy. J Lower Genit Tract Dis 2023; 27(1):.). However, we also discussed concern for endocervical pathology given that she previously had a diagnosis of HSIL in the endocervix in 2017. Therefore, also discussed option of diagnostic excisional procedure +/- definitive hysterectomy. Discussed that repeat excision in the absence of hysterectomy does increase the risk of further cervical stenosis, and while excision would provide diagnostic evaluation at this time, it may further inhibit endocervical evaluation in the future.  After discussion of risks/benefits of each option, patient and her family strongly desire definitive management. Plan as follows:  -If pathology HSIL: RTC to discuss excisional procedure with [facility] prior to definitive hysterectomy.   -If pathology LSIL or negative: Plan for definitive hysterectomy without additional evaluation prior.   -If endocervical sampling insufficient: Will get pelvic MRI to rule-out an obvious endocervical mass prior to definitive hysterectomy.  -Will schedule definitive Pelvic exam under anesthesia, total laparoscopic hysterectomy, bilateral salpingo-oophorectomy.  Surgery to be done at [facility].  Plan for surgery in September.        2.) Genetic risk factors were assessed and the patient does not meet the qualifications for a referral.      3.) Labs and/or tests ordered include:  1) Preoperative labs: CBC, type & screen. 2) Preoperative chest xray, EKG. 3) Surgical pathology: Endocervical curettings.      4.) Health maintenance issues addressed today include none.  --The patient will see her primary care provider for preoperative evaluation.      5.) Code status:  Full-code.    6.) Prescriptions: None.    7.) Pre-op teaching was completed today.    Risks of the surgery were discussed, including  but not limited to:  Bleeding, possibly requiring a blood transfusion; injury to adjacent organs; postoperative complications (infection, possibly requiring antibiotics, drain placement, wound opening; blood clot and risk of pulmonary embolism); medical complications (pneumonia; stroke; heart attack; death); long-term complications (hernia; chronic pain).    The patient was counseled that hysterectomy is associated with an increased risk of pelvic organ prolapse and/or urinary incontinence.   The patient was counseled that trainees such as fellows, residents and/or students may be involved in her case. Each team member will perform to his/her level of training, and may perform a pelvic exam under anesthesia.   The patient will receive antibiotic and VTE prophylaxis as indicated.       Entire visit conducted with the aid of her daughter serving as Slovak  per patient preference.       Francesca Emerson MD, MS, FACOG, FACS  7/6/2023  2:45 PM        CC  Patient Care Team:  Bismark Payne Mai, MD as PCP - General (Internal Medicine)  Lupillo Lincoln MD as Assigned OBGYN Provider  Rin Jameson OD as Assigned Surgical Provider  Cheyenne Trevizo [facility] as Assigned [facility] Pharmacist  Paloma Adam RN as Personal Advocate & Liaison (PAL)  Pamela Melchor PA-C as Assigned PCP  Francesca Emerson MD as MD (Gynecologic Oncology)  Shaw Trevizo MD as MD (OB/Gyn)  SHAW TREVIZO

## 2023-07-06 ENCOUNTER — OFFICE VISIT (OUTPATIENT)
Dept: ONCOLOGY | Facility: CLINIC | Age: 68
End: 2023-07-06
Attending: OBSTETRICS & GYNECOLOGY
Payer: COMMERCIAL

## 2023-07-06 ENCOUNTER — PATIENT OUTREACH (OUTPATIENT)
Dept: ONCOLOGY | Facility: CLINIC | Age: 68
End: 2023-07-06
Payer: COMMERCIAL

## 2023-07-06 VITALS
RESPIRATION RATE: 20 BRPM | TEMPERATURE: 98 F | HEART RATE: 69 BPM | SYSTOLIC BLOOD PRESSURE: 170 MMHG | DIASTOLIC BLOOD PRESSURE: 76 MMHG | OXYGEN SATURATION: 100 %

## 2023-07-06 DIAGNOSIS — N88.2 CERVICAL STENOSIS (UTERINE CERVIX): ICD-10-CM

## 2023-07-06 DIAGNOSIS — R87.810 CERVICAL HIGH RISK HPV (HUMAN PAPILLOMAVIRUS) TEST POSITIVE: Primary | ICD-10-CM

## 2023-07-06 DIAGNOSIS — D06.9 SEVERE DYSPLASIA OF CERVIX (CIN III): ICD-10-CM

## 2023-07-06 DIAGNOSIS — Z01.818 PREOP TESTING: ICD-10-CM

## 2023-07-06 PROCEDURE — 93005 ELECTROCARDIOGRAM TRACING: CPT

## 2023-07-06 PROCEDURE — 88305 TISSUE EXAM BY PATHOLOGIST: CPT | Mod: 26 | Performed by: PATHOLOGY

## 2023-07-06 PROCEDURE — 57454 BX/CURETT OF CERVIX W/SCOPE: CPT | Performed by: OBSTETRICS & GYNECOLOGY

## 2023-07-06 PROCEDURE — 57456 ENDOCERV CURETTAGE W/SCOPE: CPT

## 2023-07-06 PROCEDURE — G0463 HOSPITAL OUTPT CLINIC VISIT: HCPCS | Mod: 25 | Performed by: OBSTETRICS & GYNECOLOGY

## 2023-07-06 PROCEDURE — 88305 TISSUE EXAM BY PATHOLOGIST: CPT | Mod: TC | Performed by: OBSTETRICS & GYNECOLOGY

## 2023-07-06 PROCEDURE — 99203 OFFICE O/P NEW LOW 30 MIN: CPT | Mod: 25 | Performed by: OBSTETRICS & GYNECOLOGY

## 2023-07-06 PROCEDURE — 93010 ELECTROCARDIOGRAM REPORT: CPT | Mod: 59 | Performed by: OBSTETRICS & GYNECOLOGY

## 2023-07-06 RX ORDER — METRONIDAZOLE 500 MG/100ML
500 INJECTION, SOLUTION INTRAVENOUS
Status: CANCELLED | OUTPATIENT
Start: 2023-07-06

## 2023-07-06 ASSESSMENT — PAIN SCALES - GENERAL: PAINLEVEL: NO PAIN (0)

## 2023-07-06 NOTE — Clinical Note
2023         RE: Rajwinder Lama  [address]  [place] [id number]        Dear Colleague,    Thank you for referring your patient, Rajwinder Lama, to the [facility]. Please see a copy of my visit note below.    Consult Note on Referred Patient  Gynecologic Oncology HPV Clinic  [facility]      Date: 2023       Referring Provider/Gynecologist:  Dr. Shaw Trevizo MD  [address] [place] [address]       Primary Care Provider:  Bismark Payne Mai  [address] [place] [address]       RE: Rajwinder Lama  : 1955  SACHIN: 2023      Reason for visit: History of cervical HSIL. Persistent hrHPV, inability to sample the endocervix.       History of Present Illness:  Rajwinder Lama *** is a 68 year old Mohawk-speaking patient referred to the Gynecologic Oncology HPV Clinic for ***. History as follows:    *HPV vaccination status: unvaccinated    17: Pap test NILM, hrHPV16+  12/15/17: Ectocervical biopsies 5:00, 7:00, 11:00 and endocervical curettings pathology  HSIL (CIN2-3).  18: LEEP.   -Pathology: Negative for residual HSIL.     19: Pap test NILM, hrHPV16+.   19: Cervical biopsy 3:00 pathology negative for dysplasia/malignancy; Endocervical cuerttings insufficient for evaluation.    22: Pap test NILM, hrHPV16+, non-16/18 hrHPV+.   22: Endocervical curettings pathology LSIL (CIN1), although definitive endocervical mucosa not identified.     23: Pap test NILM, non-16/18 hrHPV+.       Subjective:  Rajwinder presents to the gyn onc HPV clinic today for a scheduled consultation, accompanied by ***      Review of Systems:   ROS: 10 point ROS neg other than the symptoms noted above in the HPI.       Past Medical History:  Past Medical History:   Diagnosis Date     Cervical high risk HPV (human papillomavirus) test positive 2017, 19 NIL, +HPV 16     Hypertension     ((Pt Qnr Sub:  patient does have hypertension))        Past Surgical History:  Past Surgical History:   Procedure Laterality Date     CYSTECTOMY OVARIAN BENIGN N/A      gall stone      gallbladder remains       Current Medications:   Current Outpatient Medications   Medication     Cholecalciferol (VITAMIN D PO)     lisinopril-hydrochlorothiazide (ZESTORETIC) 20-12.5 MG tablet     No current facility-administered medications for this visit.        Allergies:   Allergies   Allergen Reactions     Seasonal Allergies          Social History:  Patient lives with ***. Works ***. She does not have Advanced Directives, but has identified *** as her desired Healthcare Power of .    Social History     Tobacco Use     Smoking status: Never     Smokeless tobacco: Never   Substance Use Topics     Alcohol use: Not Currently     Comment: ethipian drink rarely       History   Drug Use No       Family History:   The patient's family history is notable for ***. No known family history of breast, ovarian, uterine, colon, urothelial/renal, prostate or pancreatic cancers.  No known family history of melanoma. ***  Family History   Problem Relation Age of Onset     Coronary Artery Disease Mother      Hypertension Mother      Hypertension Father        Physical Exam: ***  There were no vitals taken for this visit.  There is no height or weight on file to calculate BMI.    General Appearance: healthy and alert, no distress     HEENT:  no thyromegaly, no palpable nodules or masses        Cardiovascular: regular rate and rhythm, no gallops, rubs or murmurs     Respiratory: lungs clear, no rales, rhonchi or wheezes, normal diaphragmatic excursion    Musculoskeletal: extremities non tender and without edema    Skin: no lesions or rashes     Neurological: normal gait, no gross defects     Psychiatric: appropriate mood and affect                               Hematological: normal cervical, supraclavicular and inguinal lymph nodes     Gastrointestinal:        abdomen soft, non-tender, non-distended, no organomegaly or masses    Genitourinary: External genitalia and urethral meatus appears normal.  Vagina is smooth without nodularity or masses.  Cervix appears normal and without lesions.  Bimanual exam reveal no masses, nodularity or fullness.  Recto-vaginal exam confirms these findings.      Procedure Risk Statement:   The patient was counseled regarding risks, benefits and alternatives to colposcopy, possible biopsies.  Risks discussed include but not limited to:  Bleeding; infection; injury to adjacent organs; inadequate sample or false negative result.  The patient's questions were answered, and informed consent signed.       Procedure:   After the informed consent was signed and time-out procedure was performed, dilute acetic acid was applied to the cervix. Colposcopy performed.  -Squamocolumnar junction fully visualized? ***  -Acetowhite changes? ***  -Punctations, mosaicism, abnormal vasculature, other abnormalities? ***  -Clinical Impression: ****  -Specimens: ***       Laboratory Examination:  5/16/23 BMP: Creatinine 0.58.       Performance Status:  ECOG Grade ***      Assessment:  Rajwinder Gravesomna *** is a 68 year old Lao-speaking patient with a history of cervical HSIL 2017 with persistent hrHPV+ cervical cancer screening and inability to sample the endocervix, clinical impression ***, pathology pending ***.     A total of *** minutes was spent with the patient, *** minutes of which were spent in counseling the patient and/or treatment planning, *** minutes of which were spent in the procedure above; an additional *** minutes was spent in chart review/documentation.      Plan:      1.)    HrHPV+: I reviewed and confirmed the history above with the patient.   We will notify her of the pathology results via ***  -If pathology HSIL: RTC to discuss excisional procedure with ***  -If pathology LSIL or negative: Repeat HPV-based testing in 1 year with  consideration of concomitant colposcopy given high likelihood of persistent hrHPV.     If endocervical sampling insufficient: Discussed that the colposcopic standards state endocervical sampling can be omitted in the setting of cervical stenosis which would require a surgical procedure to obtain a sample (Elba TRIVEDI, et al. Colposcopy Standards: Guidelines for endocervical curettage at colposcopy. J Lower Genit Tract Dis 2023; 27(1):.). However, we also discussed concern for endocervical pathology given that she previously had a diagnosis of HSIL in the endocervix in 2017. Therefore, also discussed option of diagnostic excisional procedure +/- definitive hysterectomy. Discussed that repeat excision in the absence of hysterectomy does increase the risk of further cervical stenosis, and while excision would provide diagnostic evaluation at this time, it may further inhibit endocervical evaluation in the future.  After discussion of risks/benefits of each option, plan as follows:  ***     2.) Genetic risk factors were assessed and the patient does not meet the qualifications for a referral.      3.) Labs and/or tests ordered include:  ***.     4.) Health maintenance issues addressed today include none.    5.) Code status:  Full-code.    6.) Prescriptions: ***    7.) Pre-op teaching was completed today.  ***      Entire visit conducted with the aid of a [facility] Chinese .       ***    CC  Patient Care Team:  Bismark Payne Mai, MD as PCP - General (Internal Medicine)  Lupillo Lincoln MD as Assigned OBGYN Provider  Rin Jameson OD as Assigned Surgical Provider  Cheyenne Trevizo [facility] as Assigned [facility] Pharmacist  Paloma Adam RN as Personal Advocate & Liaison (PAL)  Pamela Mlechor PA-C as Assigned PCP  Francesca Emerson MD as MD (Gynecologic Oncology)  Shaw Trevizo MD as MD (OB/Gyn)  SHAW TREVIZO        Again, thank you for allowing me to participate in the  care of your patient.        Sincerely,        Francesca Emerson MD

## 2023-07-06 NOTE — PROGRESS NOTES
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    Teaching Topic: TLH BSO, possible cancer staging    Teaching Concerns addressed: Yes    Patient, Family demonstrate understanding of the following:     Reason for the appointment, diagnosis and treatment plan:   Yes     Knowledge of proper use of medications and conditions for which they are ordered (with special attention to potential side effects or drug interactions): Yes     Which situations necessitate calling provider and whom to contact: Yes       Nutritional needs and diet plan:  Yes        Pain management techniques:  Yes    Diet:  Yes     Infection Prevention:  Patient, Family demonstrate understanding of the following:     Pre-Op CHG Bathing Instructions: Yes    Surgical procedure site care taught:   Yes     Signs and symptoms of infection taught: Yes     Instructional Materials Used/Given:    Your Surgery Day    Preparing for Your Surgery    Showering before Surgery  o Provided patient with 2 bottles of CHG product.     Home Care after Major Surgery in the Abdomen or Pelvis    Home Care after Gynecologic Surgery    Eating After Surgery Gynecologic Oncology    Tips to Increase the Protein in Your Diet     Map    Phone number for [employer]     Visiting a Loved One in the Hospital during the COVID-19 Outbreak    (Consents to be signed in pre-op.)      Met with patient  for pre-operative teaching.  Surgery scheduled on TBD - September 2023 with Dr. Emerson at [facility].       Pre-operative H&P:  Done 7/6/23 update day of surgery    Pre-operative Labs/Imaging:  none      Reviewed pre-operative eating/drinking restrictions and showering instructions.    Reviewed medications that must be held for at least 7 days prior to surgery, including: NSAIDs, Aspirin (or any other product containing aspirin), Ibuprofen, Naproxen, and supplements/vitamins (Fish oil/Flax seed oil, and Multivitamin/Vit. E).    It is  scheduled as a Same Day surgery, so she was informed that she will need someone to drive her home after surgery and someone to stay with her for at least 24 hours after she arrives home.     Patient will need to see Dr. Emerson, 3-4 weeks after her surgery.  Post-op visit TBD.    Reviewed post-operative care (including activity and lifting restrictions) and what to expect during recovery.    Reviewed signs and symptoms that would warrant contacting provider immediately and/or seeking emergency care.  o Reviewed after-hours/holiday/weekends contact: Patient should call clinic number and ask the answering service to connect them with the GYN Oncology Physician on-call.    Patient  had the opportunity to ask questions and all questions were answered to her satisfaction.  Patient verbalized understanding and agreement with plan.  Instructed to call the clinic with any questions, concerns, or worsening symptoms.     Thanks    Daniela Hammer RN Care Coordinator  [facility]  Ph.[phone] Fax. [phone]

## 2023-07-06 NOTE — Clinical Note
Hi Dr. Trevizo Thank you for referring Rajwinder Lama to the gyn onc HPV clinic for evaluation of persistent hrHPV+ cervical cancer screening with cervical stenosis. I attempted to get an endocervical sample with a stab incision today, but regardless of the results Rajwinder strongly desires definitive surgical management, and will be scheduled for a TLH, BSO in the fall. I will update you with the pathology results. Please contact me if you have any questions or concerns.  --darrel Emerson MD, MS, FACOG, FACS 7/6/2023 5:09 PM

## 2023-07-07 ENCOUNTER — MYC REFILL (OUTPATIENT)
Dept: PEDIATRICS | Facility: CLINIC | Age: 68
End: 2023-07-07
Payer: COMMERCIAL

## 2023-07-07 DIAGNOSIS — I10 BENIGN ESSENTIAL HYPERTENSION: ICD-10-CM

## 2023-07-10 ENCOUNTER — PATIENT OUTREACH (OUTPATIENT)
Dept: OBGYN | Facility: CLINIC | Age: 68
End: 2023-07-10
Payer: COMMERCIAL

## 2023-07-10 DIAGNOSIS — D06.9 SEVERE DYSPLASIA OF CERVIX (CIN III): ICD-10-CM

## 2023-07-10 NOTE — TELEPHONE ENCOUNTER
6/28/23 Art - inadequate due to stenosis and markedly attenuated. Referred to dysplasia clinic.

## 2023-07-11 RX ORDER — LISINOPRIL AND HYDROCHLOROTHIAZIDE 12.5; 2 MG/1; MG/1
TABLET ORAL
Qty: 30 TABLET | Refills: 0 | OUTPATIENT
Start: 2023-07-11

## 2023-07-12 ENCOUNTER — TELEPHONE (OUTPATIENT)
Dept: ONCOLOGY | Facility: CLINIC | Age: 68
End: 2023-07-12
Payer: COMMERCIAL

## 2023-07-12 LAB
PATH REPORT.COMMENTS IMP SPEC: NORMAL
PATH REPORT.COMMENTS IMP SPEC: NORMAL
PATH REPORT.FINAL DX SPEC: NORMAL
PATH REPORT.GROSS SPEC: NORMAL
PATH REPORT.MICROSCOPIC SPEC OTHER STN: NORMAL
PATH REPORT.RELEVANT HX SPEC: NORMAL
PHOTO IMAGE: NORMAL

## 2023-07-12 NOTE — TELEPHONE ENCOUNTER
Left voicemail for patient regarding scheduling surgery with Dr. Emerson.    Provided contact number to discuss.    P: [phone]    __    Char Romero, on 7/12/2023 at 3:25 PM

## 2023-07-17 NOTE — TELEPHONE ENCOUNTER
Sent My Chart message to patient offering 9/11 for surgery.   Char Romero on 7/17/2023 at 9:24 AM

## 2023-07-20 NOTE — TELEPHONE ENCOUNTER
7/6/23 ECC- A.  Endocervical curettings:  - Benign ectocervical squamous tissue fragments only without sampling of the contiguous endocervical glandular tissue from transformation zone. Plan hysterectomy per OV note from Dr Emerson

## 2023-07-24 ENCOUNTER — TELEPHONE (OUTPATIENT)
Dept: ONCOLOGY | Facility: CLINIC | Age: 68
End: 2023-07-24
Payer: COMMERCIAL

## 2023-07-24 NOTE — TELEPHONE ENCOUNTER
Called patient to schedule surgery with: Dr. Emerson     Surgery Date: 9/25/23     Location: [facility]    H&P: already completed by surgeon on 7/6/23, will update DOS as needed     Post-op: will be scheduled by the clinic    Patient will receive a phone call from pre-admission nurses 1-2 days prior to surgery with arrival and start time.    Patient aware times are subject to change up until day before surgery.     Patient questions/concerns: N/A     Surgery packet was provided by the [facility]       Char Romero on 7/24/2023 at 10:04 AM

## 2023-08-01 ENCOUNTER — ALLIED HEALTH/NURSE VISIT (OUTPATIENT)
Dept: PEDIATRICS | Facility: CLINIC | Age: 68
End: 2023-08-01
Payer: COMMERCIAL

## 2023-08-01 VITALS — SYSTOLIC BLOOD PRESSURE: 150 MMHG | DIASTOLIC BLOOD PRESSURE: 70 MMHG

## 2023-08-01 DIAGNOSIS — I10 BENIGN ESSENTIAL HYPERTENSION: Primary | ICD-10-CM

## 2023-08-01 PROCEDURE — 99207 PR NO CHARGE NURSE ONLY: CPT

## 2023-08-01 NOTE — PROGRESS NOTES
I met with Rajwinder Lama at the request of Dr Payne to recheck her blood pressure.  Blood pressure medications on the med list were reviewed with patient.    Patient has taken all medications as per usual regimen: Yes  Patient reports tolerating them without any issues or concerns: Yes    Vitals:    08/01/23 0934 08/01/23 0948   BP: (!) 152/80 (!) 150/70       After 10 minutes, the patient's blood pressure remained greater than or equal to 140/90.    Is the patient currently having any chest pain? No  Does the patient currently have a headache? No  Does the patient currently have any vision changes? No  Does the patient currently have any nausea? No  Does the patient currently have any abdominal pain? No    The previous encounter was reviewed.  The patient was discharged and the note will be sent to the provider for final review.

## 2023-08-04 ENCOUNTER — HOSPITAL ENCOUNTER (OUTPATIENT)
Dept: MRI IMAGING | Facility: CLINIC | Age: 68
Discharge: HOME | End: 2023-08-04
Attending: OBSTETRICS & GYNECOLOGY | Admitting: OBSTETRICS & GYNECOLOGY
Payer: COMMERCIAL

## 2023-08-04 DIAGNOSIS — N88.2 CERVICAL STENOSIS (UTERINE CERVIX): ICD-10-CM

## 2023-08-04 DIAGNOSIS — D06.9 SEVERE DYSPLASIA OF CERVIX (CIN III): ICD-10-CM

## 2023-08-04 PROCEDURE — 72197 MRI PELVIS W/O & W/DYE: CPT

## 2023-08-04 PROCEDURE — A9585 GADOBUTROL INJECTION: HCPCS | Performed by: OBSTETRICS & GYNECOLOGY

## 2023-08-04 PROCEDURE — 255N000002 HC RX 255 OP 636: Performed by: OBSTETRICS & GYNECOLOGY

## 2023-08-04 RX ORDER — GADOBUTROL 604.72 MG/ML
8 INJECTION INTRAVENOUS ONCE
Status: COMPLETED | OUTPATIENT
Start: 2023-08-04 | End: 2023-08-04

## 2023-08-04 RX ADMIN — GADOBUTROL 8 ML: 604.72 INJECTION INTRAVENOUS at 09:02

## 2023-08-12 DIAGNOSIS — I10 BENIGN ESSENTIAL HYPERTENSION: ICD-10-CM

## 2023-08-15 RX ORDER — LISINOPRIL AND HYDROCHLOROTHIAZIDE 12.5; 2 MG/1; MG/1
TABLET ORAL
Qty: 30 TABLET | Refills: 0 | OUTPATIENT
Start: 2023-08-15

## 2023-08-15 RX ORDER — LISINOPRIL AND HYDROCHLOROTHIAZIDE 20; 25 MG/1; MG/1
1 TABLET ORAL DAILY
Qty: 90 TABLET | Refills: 3 | Status: SHIPPED | OUTPATIENT
Start: 2023-08-15

## 2023-08-15 NOTE — TELEPHONE ENCOUNTER
Routing refill request to provider for review/approval because:  Labs out of range:  Blood pressure  BP Readings from Last 3 Encounters:   08/01/23 (!) 150/70   07/06/23 (!) 170/76   06/28/23 (!) 168/90

## 2023-08-19 NOTE — PATIENT INSTRUCTIONS
Will increase dose of medication (prescribed today)  Follow-up for nurse visit in 1 week to recheck blood pressure    Preventive Health Recommendations    See your health care provider every year to    Review health changes.     Discuss preventive care.      Review your medicines if your doctor has prescribed any.      You no longer need a yearly Pap test unless you've had an abnormal Pap test in the past 10 years. If you have vaginal symptoms, such as bleeding or discharge, be sure to talk with your provider about a Pap test.      Every 1 to 2 years, have a mammogram.  If you are over 69, talk with your health care provider about whether or not you want to continue having screening mammograms.      Every 10 years, have a colonoscopy. Or, have a yearly FIT test (stool test). These exams will check for colon cancer.       Have a cholesterol test every 5 years, or more often if your doctor advises it.       Have a diabetes test (fasting glucose) every three years. If you are at risk for diabetes, you should have this test more often.       At age 65, have a bone density scan (DEXA) to check for osteoporosis (brittle bone disease).    Shots:    Get a flu shot each year.    Get a tetanus shot every 10 years.    Talk to your doctor about your pneumonia vaccines. There are now two you should receive - Pneumovax (PPSV 23) and Prevnar (PCV 13).    Talk to your pharmacist about the shingles vaccine.    Talk to your doctor about the hepatitis B vaccine.    Nutrition:     Eat at least 5 servings of fruits and vegetables each day.      Eat whole-grain bread, whole-wheat pasta and brown rice instead of white grains and rice.      Get adequate Calcium and Vitamin D.     Lifestyle    Exercise at least 150 minutes a week (30 minutes a day, 5 days a week). This will help you control your weight and prevent disease.      Limit alcohol to one drink per day.      No smoking.       Wear sunscreen to prevent skin cancer.       See your  dentist twice a year for an exam and cleaning.      See your eye doctor every 1 to 2 years to screen for conditions such as glaucoma, macular degeneration and cataracts.    Personalized Prevention Plan  You are due for the preventive services outlined below.  Your care team is available to assist you in scheduling these services.  If you have already completed any of these items, please share that information with your care team to update in your medical record.  Health Maintenance Due   Topic Date Due     HIV SCREEN (SYSTEM ASSIGNED)  01/01/1972     Colon Cancer Screening - every 10 years.  01/01/2004     Zoster (Shingles) Vaccine (1 of 2) 01/01/2004     FALL RISK ASSESSMENT  01/01/2019     Bone Density Screening (Dexa)  01/01/2019     HPV test with Pap every year  02/14/2019     Pap Smear (diagnostic) - yearly  02/14/2019      Her/She

## 2023-08-22 ENCOUNTER — ANCILLARY PROCEDURE (OUTPATIENT)
Dept: MAMMOGRAPHY | Facility: CLINIC | Age: 68
End: 2023-08-22
Payer: COMMERCIAL

## 2023-08-22 DIAGNOSIS — Z12.31 VISIT FOR SCREENING MAMMOGRAM: ICD-10-CM

## 2023-08-22 PROCEDURE — 77063 BREAST TOMOSYNTHESIS BI: CPT | Mod: TC | Performed by: RADIOLOGY

## 2023-08-22 PROCEDURE — 77067 SCR MAMMO BI INCL CAD: CPT | Mod: TC | Performed by: RADIOLOGY

## 2023-08-24 ENCOUNTER — OFFICE VISIT (OUTPATIENT)
Dept: OPTOMETRY | Facility: CLINIC | Age: 68
End: 2023-08-24
Payer: COMMERCIAL

## 2023-08-24 DIAGNOSIS — H52.03 HYPEROPIA OF BOTH EYES: Primary | ICD-10-CM

## 2023-08-24 DIAGNOSIS — H52.4 PRESBYOPIA: ICD-10-CM

## 2023-08-24 DIAGNOSIS — D31.31 NEVUS, CHOROIDAL, RIGHT: ICD-10-CM

## 2023-08-24 DIAGNOSIS — H21.302: ICD-10-CM

## 2023-08-24 PROCEDURE — 92014 COMPRE OPH EXAM EST PT 1/>: CPT | Performed by: OPTOMETRIST

## 2023-08-24 PROCEDURE — 92015 DETERMINE REFRACTIVE STATE: CPT | Performed by: OPTOMETRIST

## 2023-08-24 ASSESSMENT — CONF VISUAL FIELD
OD_SUPERIOR_TEMPORAL_RESTRICTION: 0
OS_NORMAL: 1
OD_INFERIOR_NASAL_RESTRICTION: 0
OS_INFERIOR_NASAL_RESTRICTION: 0
OD_SUPERIOR_NASAL_RESTRICTION: 0
METHOD: COUNTING FINGERS
OS_SUPERIOR_TEMPORAL_RESTRICTION: 0
OS_INFERIOR_TEMPORAL_RESTRICTION: 0
OD_NORMAL: 1
OD_INFERIOR_TEMPORAL_RESTRICTION: 0
OS_SUPERIOR_NASAL_RESTRICTION: 0

## 2023-08-24 ASSESSMENT — VISUAL ACUITY
OD_CC: 20/30-2
OS_SC: 20/200
OD_CC+: -2
OS_CC: 20/50
OS_CC: 20/30
OD_SC: 20/70
METHOD: SNELLEN - LINEAR
CORRECTION_TYPE: GLASSES
OD_CC: 20/30

## 2023-08-24 ASSESSMENT — REFRACTION_MANIFEST
OS_AXIS: 028
OD_CYLINDER: SPHERE
OD_SPHERE: +2.25
OS_SPHERE: +3.00
OD_ADD: +2.50
OD_CYLINDER: +0.75
METHOD_AUTOREFRACTION: 1
OS_AXIS: 023
OS_CYLINDER: +1.50
OS_CYLINDER: +1.50
OD_SPHERE: +2.75
OS_SPHERE: +5.00
OD_AXIS: 175
OS_ADD: +2.50

## 2023-08-24 ASSESSMENT — REFRACTION_WEARINGRX
SPECS_TYPE: BIFOCAL
OD_CYLINDER: SPHERE
OS_AXIS: 030
OS_ADD: +2.75
OD_ADD: +2.75
OD_SPHERE: +2.00
OS_CYLINDER: +0.50
OS_SPHERE: +3.50

## 2023-08-24 ASSESSMENT — SLIT LAMP EXAM - LIDS
COMMENTS: NORMAL
COMMENTS: NORMAL

## 2023-08-24 ASSESSMENT — EXTERNAL EXAM - LEFT EYE: OS_EXAM: NORMAL

## 2023-08-24 ASSESSMENT — CUP TO DISC RATIO
OD_RATIO: 0.5
OS_RATIO: 0.5

## 2023-08-24 ASSESSMENT — TONOMETRY
IOP_METHOD: TONOPEN
OD_IOP_MMHG: 14
OS_IOP_MMHG: 16

## 2023-08-24 ASSESSMENT — EXTERNAL EXAM - RIGHT EYE: OD_EXAM: NORMAL

## 2023-08-24 NOTE — LETTER
8/24/2023         RE: Rajwinder Lama  [address]  [place] [address]        Dear Colleague,    Thank you for referring your patient, Rajwinder Lama, to the [facility] [place]. Please see a copy of my visit note below.    Chief Complaint   Patient presents with     Annual Eye Exam      Accompanied by daughter who is helping interpret   Last Eye Exam: 08/2022  Dilated Previously: Yes, side effects of dilation explained today    What are you currently using to see?  glasses       Distance Vision Acuity: Satisfied with vision with glasses     Near Vision Acuity: Satisfied with vision while reading and using computer with glasses    Eye Comfort: good  Do you use eye drops? : No      Cora Gr - Optometric Assistant           Medical, surgical and family histories reviewed and updated 8/24/2023.       OBJECTIVE: See Ophthalmology exam    ASSESSMENT:    ICD-10-CM    1. Hyperopia of both eyes  H52.03       2. Presbyopia  H52.4       3. Cyst of iris margin, left  H21.302       4. 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        Sincerely,        Rin Jameson, OD

## 2023-08-24 NOTE — PROGRESS NOTES
Chief Complaint   Patient presents with    Annual Eye Exam      Accompanied by daughter who is helping interpret   Last Eye Exam: 08/2022  Dilated Previously: Yes, side effects of dilation explained today    What are you currently using to see?  glasses       Distance Vision Acuity: Satisfied with vision with glasses     Near Vision Acuity: Satisfied with vision while reading and using computer with glasses    Eye Comfort: good  Do you use eye drops? : No      Cora Gr - Optometric Assistant           Medical, surgical and family histories reviewed and updated 8/24/2023.       OBJECTIVE: See Ophthalmology exam    ASSESSMENT:    ICD-10-CM    1. Hyperopia of both eyes  H52.03       2. Presbyopia  H52.4       3. Cyst of iris margin, left  H21.302       4. 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

## 2023-08-30 ENCOUNTER — ALLIED HEALTH/NURSE VISIT (OUTPATIENT)
Dept: PEDIATRICS | Facility: CLINIC | Age: 68
End: 2023-08-30
Payer: COMMERCIAL

## 2023-08-30 VITALS — HEART RATE: 69 BPM | DIASTOLIC BLOOD PRESSURE: 75 MMHG | SYSTOLIC BLOOD PRESSURE: 138 MMHG

## 2023-08-30 DIAGNOSIS — Z01.30 BLOOD PRESSURE CHECK: Primary | ICD-10-CM

## 2023-08-30 PROCEDURE — 99207 PR NO CHARGE NURSE ONLY: CPT

## 2023-08-30 NOTE — PROGRESS NOTES
Rajwinder Lama is a 68 year old patient who comes in today for a Blood Pressure check.  Initial BP:  /75 (BP Location: Right arm, Patient Position: Sitting, Cuff Size: Adult Large)   Pulse 69      69  Disposition: results routed to provider    Amelie Hopkins MA

## 2023-09-24 ENCOUNTER — ANESTHESIA EVENT (OUTPATIENT)
Dept: SURGERY | Facility: CLINIC | Age: 68
End: 2023-09-24
Payer: COMMERCIAL

## 2023-09-25 ENCOUNTER — HOSPITAL ENCOUNTER (OUTPATIENT)
Facility: CLINIC | Age: 68
Discharge: HOME | End: 2023-09-25
Attending: OBSTETRICS & GYNECOLOGY | Admitting: OBSTETRICS & GYNECOLOGY
Payer: COMMERCIAL

## 2023-09-25 ENCOUNTER — APPOINTMENT (OUTPATIENT)
Dept: INTERPRETER SERVICES | Facility: CLINIC | Age: 68
End: 2023-09-25
Payer: MEDICARE

## 2023-09-25 ENCOUNTER — ANESTHESIA (OUTPATIENT)
Dept: SURGERY | Facility: CLINIC | Age: 68
End: 2023-09-25
Payer: COMMERCIAL

## 2023-09-25 ENCOUNTER — TELEPHONE (OUTPATIENT)
Dept: ONCOLOGY | Facility: CLINIC | Age: 68
End: 2023-09-25

## 2023-09-25 VITALS
SYSTOLIC BLOOD PRESSURE: 126 MMHG | OXYGEN SATURATION: 95 % | BODY MASS INDEX: 32.04 KG/M2 | WEIGHT: 174.1 LBS | DIASTOLIC BLOOD PRESSURE: 66 MMHG | RESPIRATION RATE: 16 BRPM | TEMPERATURE: 96.8 F | HEIGHT: 62 IN | HEART RATE: 57 BPM

## 2023-09-25 DIAGNOSIS — Z90.710 S/P TOTAL HYSTERECTOMY AND BSO (BILATERAL SALPINGO-OOPHORECTOMY): Primary | ICD-10-CM

## 2023-09-25 DIAGNOSIS — Z90.722 S/P TOTAL HYSTERECTOMY AND BSO (BILATERAL SALPINGO-OOPHORECTOMY): Primary | ICD-10-CM

## 2023-09-25 DIAGNOSIS — Z98.890 STATUS POST LAPAROSCOPIC SURGERY: ICD-10-CM

## 2023-09-25 DIAGNOSIS — Z90.79 S/P TOTAL HYSTERECTOMY AND BSO (BILATERAL SALPINGO-OOPHORECTOMY): Primary | ICD-10-CM

## 2023-09-25 PROBLEM — R87.610 ASCUS WITH POSITIVE HIGH RISK HPV CERVICAL: Status: ACTIVE | Noted: 2023-09-25

## 2023-09-25 PROBLEM — R87.810 ASCUS WITH POSITIVE HIGH RISK HPV CERVICAL: Status: ACTIVE | Noted: 2023-09-25

## 2023-09-25 LAB
ABO/RH(D): NORMAL
ANION GAP SERPL CALCULATED.3IONS-SCNC: 11 MMOL/L (ref 7–15)
ANTIBODY SCREEN: NEGATIVE
BUN SERPL-MCNC: 18.4 MG/DL (ref 8–23)
CALCIUM SERPL-MCNC: 10 MG/DL (ref 8.8–10.2)
CHLORIDE SERPL-SCNC: 103 MMOL/L (ref 98–107)
CREAT SERPL-MCNC: 0.63 MG/DL (ref 0.51–0.95)
DEPRECATED HCO3 PLAS-SCNC: 29 MMOL/L (ref 22–29)
EGFRCR SERPLBLD CKD-EPI 2021: >90 ML/MIN/1.73M2
ERYTHROCYTE [DISTWIDTH] IN BLOOD BY AUTOMATED COUNT: 11.9 % (ref 10–15)
GLUCOSE SERPL-MCNC: 111 MG/DL (ref 70–99)
HCT VFR BLD AUTO: 38.6 % (ref 35–47)
HGB BLD-MCNC: 13.2 G/DL (ref 11.7–15.7)
MCH RBC QN AUTO: 29.8 PG (ref 26.5–33)
MCHC RBC AUTO-ENTMCNC: 34.2 G/DL (ref 31.5–36.5)
MCV RBC AUTO: 87 FL (ref 78–100)
PLATELET # BLD AUTO: 177 10E3/UL (ref 150–450)
POTASSIUM SERPL-SCNC: 4.1 MMOL/L (ref 3.4–5.3)
RBC # BLD AUTO: 4.43 10E6/UL (ref 3.8–5.2)
SODIUM SERPL-SCNC: 143 MMOL/L (ref 136–145)
SPECIMEN EXPIRATION DATE: NORMAL
WBC # BLD AUTO: 6.3 10E3/UL (ref 4–11)

## 2023-09-25 PROCEDURE — 250N000011 HC RX IP 250 OP 636: Performed by: ANESTHESIOLOGY

## 2023-09-25 PROCEDURE — 370N000017 HC ANESTHESIA TECHNICAL FEE, PER MIN: Performed by: OBSTETRICS & GYNECOLOGY

## 2023-09-25 PROCEDURE — 250N000025 HC SEVOFLURANE, PER MIN: Performed by: OBSTETRICS & GYNECOLOGY

## 2023-09-25 PROCEDURE — 86901 BLOOD TYPING SEROLOGIC RH(D): CPT | Performed by: OBSTETRICS & GYNECOLOGY

## 2023-09-25 PROCEDURE — 250N000011 HC RX IP 250 OP 636: Performed by: OBSTETRICS & GYNECOLOGY

## 2023-09-25 PROCEDURE — 999N000141 HC STATISTIC PRE-PROCEDURE NURSING ASSESSMENT: Performed by: OBSTETRICS & GYNECOLOGY

## 2023-09-25 PROCEDURE — 258N000003 HC RX IP 258 OP 636: Performed by: ANESTHESIOLOGY

## 2023-09-25 PROCEDURE — 250N000013 HC RX MED GY IP 250 OP 250 PS 637: Performed by: ANESTHESIOLOGY

## 2023-09-25 PROCEDURE — 250N000009 HC RX 250: Performed by: OBSTETRICS & GYNECOLOGY

## 2023-09-25 PROCEDURE — 88307 TISSUE EXAM BY PATHOLOGIST: CPT | Mod: TC | Performed by: OBSTETRICS & GYNECOLOGY

## 2023-09-25 PROCEDURE — 250N000011 HC RX IP 250 OP 636: Mod: JZ | Performed by: NURSE ANESTHETIST, CERTIFIED REGISTERED

## 2023-09-25 PROCEDURE — 85027 COMPLETE CBC AUTOMATED: CPT | Performed by: ANESTHESIOLOGY

## 2023-09-25 PROCEDURE — 86850 RBC ANTIBODY SCREEN: CPT | Performed by: OBSTETRICS & GYNECOLOGY

## 2023-09-25 PROCEDURE — 250N000013 HC RX MED GY IP 250 OP 250 PS 637

## 2023-09-25 PROCEDURE — 272N000001 HC OR GENERAL SUPPLY STERILE: Performed by: OBSTETRICS & GYNECOLOGY

## 2023-09-25 PROCEDURE — 360N000077 HC SURGERY LEVEL 4, PER MIN: Performed by: OBSTETRICS & GYNECOLOGY

## 2023-09-25 PROCEDURE — 710N000009 HC RECOVERY PHASE 1, LEVEL 1, PER MIN: Performed by: OBSTETRICS & GYNECOLOGY

## 2023-09-25 PROCEDURE — 36415 COLL VENOUS BLD VENIPUNCTURE: CPT | Performed by: OBSTETRICS & GYNECOLOGY

## 2023-09-25 PROCEDURE — 80048 BASIC METABOLIC PNL TOTAL CA: CPT | Performed by: ANESTHESIOLOGY

## 2023-09-25 PROCEDURE — 710N000012 HC RECOVERY PHASE 2, PER MINUTE: Performed by: OBSTETRICS & GYNECOLOGY

## 2023-09-25 PROCEDURE — 250N000011 HC RX IP 250 OP 636: Mod: JZ | Performed by: OBSTETRICS & GYNECOLOGY

## 2023-09-25 PROCEDURE — 250N000009 HC RX 250: Performed by: NURSE ANESTHETIST, CERTIFIED REGISTERED

## 2023-09-25 RX ORDER — ONDANSETRON 4 MG/1
4 TABLET, ORALLY DISINTEGRATING ORAL EVERY 30 MIN PRN
Status: DISCONTINUED | OUTPATIENT
Start: 2023-09-25 | End: 2023-09-25 | Stop reason: HOSPADM

## 2023-09-25 RX ORDER — LABETALOL 20 MG/4 ML (5 MG/ML) INTRAVENOUS SYRINGE
PRN
Status: DISCONTINUED | OUTPATIENT
Start: 2023-09-25 | End: 2023-09-25

## 2023-09-25 RX ORDER — METRONIDAZOLE 500 MG/100ML
500 INJECTION, SOLUTION INTRAVENOUS
Status: COMPLETED | OUTPATIENT
Start: 2023-09-25 | End: 2023-09-25

## 2023-09-25 RX ORDER — LABETALOL HYDROCHLORIDE 5 MG/ML
10 INJECTION, SOLUTION INTRAVENOUS
Status: DISCONTINUED | OUTPATIENT
Start: 2023-09-25 | End: 2023-09-25 | Stop reason: HOSPADM

## 2023-09-25 RX ORDER — IBUPROFEN 600 MG/1
600 TABLET, FILM COATED ORAL ONCE
Status: DISCONTINUED | OUTPATIENT
Start: 2023-09-25 | End: 2023-09-25 | Stop reason: HOSPADM

## 2023-09-25 RX ORDER — LIDOCAINE 40 MG/G
CREAM TOPICAL
Status: DISCONTINUED | OUTPATIENT
Start: 2023-09-25 | End: 2023-09-25 | Stop reason: HOSPADM

## 2023-09-25 RX ORDER — BUPIVACAINE HYDROCHLORIDE 2.5 MG/ML
INJECTION, SOLUTION EPIDURAL; INFILTRATION; INTRACAUDAL PRN
Status: DISCONTINUED | OUTPATIENT
Start: 2023-09-25 | End: 2023-09-25 | Stop reason: HOSPADM

## 2023-09-25 RX ORDER — ONDANSETRON 2 MG/ML
4 INJECTION INTRAMUSCULAR; INTRAVENOUS EVERY 30 MIN PRN
Status: DISCONTINUED | OUTPATIENT
Start: 2023-09-25 | End: 2023-09-25 | Stop reason: HOSPADM

## 2023-09-25 RX ORDER — KETOROLAC TROMETHAMINE 30 MG/ML
INJECTION, SOLUTION INTRAMUSCULAR; INTRAVENOUS PRN
Status: DISCONTINUED | OUTPATIENT
Start: 2023-09-25 | End: 2023-09-25

## 2023-09-25 RX ORDER — HYDROXYZINE HYDROCHLORIDE 25 MG/1
25 TABLET, FILM COATED ORAL ONCE
Status: COMPLETED | OUTPATIENT
Start: 2023-09-25 | End: 2023-09-25

## 2023-09-25 RX ORDER — PROPOFOL 10 MG/ML
INJECTION, EMULSION INTRAVENOUS PRN
Status: DISCONTINUED | OUTPATIENT
Start: 2023-09-25 | End: 2023-09-25

## 2023-09-25 RX ORDER — ACETAMINOPHEN 325 MG/1
975 TABLET ORAL ONCE
Status: DISCONTINUED | OUTPATIENT
Start: 2023-09-25 | End: 2023-09-25 | Stop reason: HOSPADM

## 2023-09-25 RX ORDER — FENTANYL CITRATE 50 UG/ML
INJECTION, SOLUTION INTRAMUSCULAR; INTRAVENOUS PRN
Status: DISCONTINUED | OUTPATIENT
Start: 2023-09-25 | End: 2023-09-25

## 2023-09-25 RX ORDER — ACETAMINOPHEN 325 MG/1
650 TABLET ORAL EVERY 6 HOURS PRN
Qty: 24 TABLET | Refills: 0 | Status: SHIPPED | OUTPATIENT
Start: 2023-09-25 | End: 2024-02-02

## 2023-09-25 RX ORDER — DEXAMETHASONE SODIUM PHOSPHATE 4 MG/ML
INJECTION, SOLUTION INTRA-ARTICULAR; INTRALESIONAL; INTRAMUSCULAR; INTRAVENOUS; SOFT TISSUE PRN
Status: DISCONTINUED | OUTPATIENT
Start: 2023-09-25 | End: 2023-09-25

## 2023-09-25 RX ORDER — ONDANSETRON 2 MG/ML
INJECTION INTRAMUSCULAR; INTRAVENOUS PRN
Status: DISCONTINUED | OUTPATIENT
Start: 2023-09-25 | End: 2023-09-25

## 2023-09-25 RX ORDER — FENTANYL CITRATE 50 UG/ML
50 INJECTION, SOLUTION INTRAMUSCULAR; INTRAVENOUS EVERY 5 MIN PRN
Status: DISCONTINUED | OUTPATIENT
Start: 2023-09-25 | End: 2023-09-25 | Stop reason: HOSPADM

## 2023-09-25 RX ORDER — SODIUM CHLORIDE, SODIUM LACTATE, POTASSIUM CHLORIDE, CALCIUM CHLORIDE 600; 310; 30; 20 MG/100ML; MG/100ML; MG/100ML; MG/100ML
INJECTION, SOLUTION INTRAVENOUS CONTINUOUS
Status: DISCONTINUED | OUTPATIENT
Start: 2023-09-25 | End: 2023-09-25 | Stop reason: HOSPADM

## 2023-09-25 RX ORDER — MAGNESIUM HYDROXIDE 1200 MG/15ML
LIQUID ORAL PRN
Status: DISCONTINUED | OUTPATIENT
Start: 2023-09-25 | End: 2023-09-25 | Stop reason: HOSPADM

## 2023-09-25 RX ORDER — HYDROXYZINE HYDROCHLORIDE 50 MG/1
50 TABLET, FILM COATED ORAL ONCE
Status: COMPLETED | OUTPATIENT
Start: 2023-09-25 | End: 2023-09-25

## 2023-09-25 RX ORDER — CEFAZOLIN SODIUM/WATER 2 G/20 ML
2 SYRINGE (ML) INTRAVENOUS SEE ADMIN INSTRUCTIONS
Status: DISCONTINUED | OUTPATIENT
Start: 2023-09-25 | End: 2023-09-25 | Stop reason: HOSPADM

## 2023-09-25 RX ORDER — FENTANYL CITRATE 50 UG/ML
50 INJECTION, SOLUTION INTRAMUSCULAR; INTRAVENOUS
Status: DISCONTINUED | OUTPATIENT
Start: 2023-09-25 | End: 2023-09-25 | Stop reason: HOSPADM

## 2023-09-25 RX ORDER — OXYCODONE HYDROCHLORIDE 5 MG/1
10 TABLET ORAL
Status: DISCONTINUED | OUTPATIENT
Start: 2023-09-25 | End: 2023-09-25 | Stop reason: HOSPADM

## 2023-09-25 RX ORDER — PROPOFOL 10 MG/ML
INJECTION, EMULSION INTRAVENOUS CONTINUOUS PRN
Status: DISCONTINUED | OUTPATIENT
Start: 2023-09-25 | End: 2023-09-25

## 2023-09-25 RX ORDER — HYDROMORPHONE HCL IN WATER/PF 6 MG/30 ML
0.4 PATIENT CONTROLLED ANALGESIA SYRINGE INTRAVENOUS EVERY 5 MIN PRN
Status: DISCONTINUED | OUTPATIENT
Start: 2023-09-25 | End: 2023-09-25 | Stop reason: HOSPADM

## 2023-09-25 RX ORDER — HYDROMORPHONE HCL IN WATER/PF 6 MG/30 ML
0.2 PATIENT CONTROLLED ANALGESIA SYRINGE INTRAVENOUS EVERY 5 MIN PRN
Status: DISCONTINUED | OUTPATIENT
Start: 2023-09-25 | End: 2023-09-25 | Stop reason: HOSPADM

## 2023-09-25 RX ORDER — OXYCODONE HYDROCHLORIDE 5 MG/1
5-10 TABLET ORAL EVERY 4 HOURS PRN
Qty: 6 TABLET | Refills: 0 | Status: SHIPPED | OUTPATIENT
Start: 2023-09-25 | End: 2023-11-02

## 2023-09-25 RX ORDER — OXYCODONE HYDROCHLORIDE 5 MG/1
5 TABLET ORAL
Status: DISCONTINUED | OUTPATIENT
Start: 2023-09-25 | End: 2023-09-25 | Stop reason: HOSPADM

## 2023-09-25 RX ORDER — AMOXICILLIN 250 MG
1-2 CAPSULE ORAL 2 TIMES DAILY
Qty: 30 TABLET | Refills: 0 | Status: SHIPPED | OUTPATIENT
Start: 2023-09-25 | End: 2024-02-02

## 2023-09-25 RX ORDER — ACETAMINOPHEN 325 MG/1
975 TABLET ORAL ONCE
Status: COMPLETED | OUTPATIENT
Start: 2023-09-25 | End: 2023-09-25

## 2023-09-25 RX ORDER — LIDOCAINE HYDROCHLORIDE 20 MG/ML
INJECTION, SOLUTION INFILTRATION; PERINEURAL PRN
Status: DISCONTINUED | OUTPATIENT
Start: 2023-09-25 | End: 2023-09-25

## 2023-09-25 RX ORDER — OXYCODONE HYDROCHLORIDE 5 MG/1
5 TABLET ORAL
Status: COMPLETED | OUTPATIENT
Start: 2023-09-25 | End: 2023-09-25

## 2023-09-25 RX ORDER — IBUPROFEN 600 MG/1
600 TABLET, FILM COATED ORAL EVERY 6 HOURS PRN
Qty: 12 TABLET | Refills: 0 | Status: SHIPPED | OUTPATIENT
Start: 2023-09-25 | End: 2024-02-02

## 2023-09-25 RX ORDER — FENTANYL CITRATE 50 UG/ML
25 INJECTION, SOLUTION INTRAMUSCULAR; INTRAVENOUS EVERY 5 MIN PRN
Status: DISCONTINUED | OUTPATIENT
Start: 2023-09-25 | End: 2023-09-25 | Stop reason: HOSPADM

## 2023-09-25 RX ORDER — BUPIVACAINE HYDROCHLORIDE 2.5 MG/ML
INJECTION, SOLUTION EPIDURAL; INFILTRATION; INTRACAUDAL
Status: DISCONTINUED
Start: 2023-09-25 | End: 2023-09-25 | Stop reason: HOSPADM

## 2023-09-25 RX ORDER — CEFAZOLIN SODIUM/WATER 2 G/20 ML
2 SYRINGE (ML) INTRAVENOUS
Status: COMPLETED | OUTPATIENT
Start: 2023-09-25 | End: 2023-09-25

## 2023-09-25 RX ADMIN — LABETALOL 20 MG/4 ML (5 MG/ML) INTRAVENOUS SYRINGE 5 MG: at 10:54

## 2023-09-25 RX ADMIN — PROPOFOL 100 MCG/KG/MIN: 10 INJECTION, EMULSION INTRAVENOUS at 10:20

## 2023-09-25 RX ADMIN — ROCURONIUM BROMIDE 10 MG: 50 INJECTION, SOLUTION INTRAVENOUS at 10:30

## 2023-09-25 RX ADMIN — MIDAZOLAM 2 MG: 1 INJECTION INTRAMUSCULAR; INTRAVENOUS at 10:15

## 2023-09-25 RX ADMIN — SUGAMMADEX 200 MG: 100 INJECTION, SOLUTION INTRAVENOUS at 12:00

## 2023-09-25 RX ADMIN — HYDROXYZINE HYDROCHLORIDE 25 MG: 25 TABLET, FILM COATED ORAL at 08:33

## 2023-09-25 RX ADMIN — PROPOFOL 200 MG: 10 INJECTION, EMULSION INTRAVENOUS at 10:20

## 2023-09-25 RX ADMIN — KETOROLAC TROMETHAMINE 15 MG: 30 INJECTION, SOLUTION INTRAMUSCULAR at 12:02

## 2023-09-25 RX ADMIN — Medication 2 G: at 10:15

## 2023-09-25 RX ADMIN — ROCURONIUM BROMIDE 40 MG: 50 INJECTION, SOLUTION INTRAVENOUS at 10:20

## 2023-09-25 RX ADMIN — ONDANSETRON 4 MG: 2 INJECTION INTRAMUSCULAR; INTRAVENOUS at 11:39

## 2023-09-25 RX ADMIN — LIDOCAINE HYDROCHLORIDE 40 MG: 20 INJECTION, SOLUTION INFILTRATION; PERINEURAL at 10:20

## 2023-09-25 RX ADMIN — FENTANYL CITRATE 50 MCG: 50 INJECTION, SOLUTION INTRAMUSCULAR; INTRAVENOUS at 13:52

## 2023-09-25 RX ADMIN — ACETAMINOPHEN 975 MG: 325 TABLET, FILM COATED ORAL at 08:33

## 2023-09-25 RX ADMIN — FENTANYL CITRATE 50 MCG: 50 INJECTION, SOLUTION INTRAMUSCULAR; INTRAVENOUS at 10:27

## 2023-09-25 RX ADMIN — FENTANYL CITRATE 50 MCG: 50 INJECTION, SOLUTION INTRAMUSCULAR; INTRAVENOUS at 10:20

## 2023-09-25 RX ADMIN — OXYCODONE HYDROCHLORIDE 5 MG: 5 TABLET ORAL at 13:54

## 2023-09-25 RX ADMIN — SODIUM CHLORIDE, POTASSIUM CHLORIDE, SODIUM LACTATE AND CALCIUM CHLORIDE: 600; 310; 30; 20 INJECTION, SOLUTION INTRAVENOUS at 08:35

## 2023-09-25 RX ADMIN — FENTANYL CITRATE 50 MCG: 50 INJECTION, SOLUTION INTRAMUSCULAR; INTRAVENOUS at 12:43

## 2023-09-25 RX ADMIN — METRONIDAZOLE 500 MG: 500 INJECTION, SOLUTION INTRAVENOUS at 08:35

## 2023-09-25 RX ADMIN — DEXAMETHASONE SODIUM PHOSPHATE 4 MG: 4 INJECTION, SOLUTION INTRA-ARTICULAR; INTRALESIONAL; INTRAMUSCULAR; INTRAVENOUS; SOFT TISSUE at 10:21

## 2023-09-25 RX ADMIN — LABETALOL 20 MG/4 ML (5 MG/ML) INTRAVENOUS SYRINGE 10 MG: at 12:12

## 2023-09-25 ASSESSMENT — ACTIVITIES OF DAILY LIVING (ADL)
ADLS_ACUITY_SCORE: 35

## 2023-09-25 ASSESSMENT — ENCOUNTER SYMPTOMS
DYSRHYTHMIAS: 0
SEIZURES: 0

## 2023-09-25 ASSESSMENT — LIFESTYLE VARIABLES: TOBACCO_USE: 0

## 2023-09-25 NOTE — DISCHARGE INSTRUCTIONS
Today you received Toradol, an antiinflammatory medication similar to Ibuprofen.  You should not take other antiinflammatory medication, such as Ibuprofen, Motrin, Advil, Aleve, Naprosyn, etc until 6 PM.      Today you were given 975 mg of Tylenol at 8:30 AM. The recommended daily maximum dose is 4000 mg.      Same Day Surgery Discharge Instructions for  Sedation and General Anesthesia     It's not unusual to feel dizzy, light-headed or faint for up to 24 hours after surgery or while taking pain medication.  If you have these symptoms: sit for a few minutes before standing and have someone assist you when you get up to walk or use the bathroom.    You should rest and relax for the next 24 hours. We recommend you make arrangements to have an adult stay with you for at least 24 hours after your discharge.  Avoid hazardous and strenuous activity.    DO NOT DRIVE any vehicle or operate mechanical equipment for 24 hours following the end of your surgery.  Even though you may feel normal, your reactions may be affected by the medication you have received.    Do not drink alcoholic beverages for 24 hours following surgery.     Slowly progress to your regular diet as you feel able. It's not unusual to feel nauseated and/or vomit after receiving anesthesia.  If you develop these symptoms, drink clear liquids (apple juice, ginger ale, broth, 7-up, etc. ) until you feel better.  If your nausea and vomiting persists for 24 hours, please notify your surgeon.      All narcotic pain medications, along with inactivity and anesthesia, can cause constipation. Drinking plenty of liquids and increasing fiber intake will help.    For any questions of a medical nature, call your surgeon.    Do not make important decisions for 24 hours.    If you had general anesthesia, you may have a sore throat for a couple of days related to the breathing tube used during surgery.  You may use Cepacol lozenges to help with this discomfort.  If it  worsens or if you develop a fever, contact your surgeon.     If you feel your pain is not well managed with the pain medications prescribed by your surgeon, please contact your surgeon's office to let them know so they can address your concerns.      **If you have questions or concerns about your procedure, call   Dr. Emerson at [phone].**

## 2023-09-25 NOTE — OP NOTE
[facility] Operative Note    Pre-operative diagnosis: Persistent cervical high risk HPV16+ cervical cancer screening, cervical stenosis preventing adequate evaluation.   Post-operative diagnosis Persistent cervical high risk HPV16+ cervical cancer screening, cervical stenosis preventing adequate evaluation.   Procedure: Procedure(s):  Pelvic exam under anesthesia, total laparoscopic hysterectomy, bilateral salpingo-oophorectomy.    Surgeon: Francesca Emerson MD   Assistants(s): Ravi Burkett MD (PGY3)   Estimated blood loss: 15 mL    Specimens: Uterus, cervix, bilateral ovaries & fallopian tubes.   Findings: On pelvic exam under anesthesia, the cervix was flush with the vagina, but otherwise normal to palpation. Attempts to dilate and enter the cervix for placement of a uterine manipulator were unsuccessful. On laparoscopic examination, the uterus, bilateral ovaries & fallopian tubes were grossly normal. Remainder of pelvic/abdominal survey was normal.        Procedure:   The patient was taken to the operating room where general endotracheal anesthesia was administered and found to be adequate. She was placed in the dorsal lithotomy position, and a pelvic exam under anesthesia was performed with the findings as noted above. She was then prepped and draped in the normal sterile fashion.   A 12 mm balloon trocar was placed above the umbilicus using the open technique. The laparoscope was placed to confirm correct placement of the trocar, and CO2 gas was insufflated to a pressure of 15 mmHg. Bilateral lower quadrant 5 mm ports were placed under direct visualization with the laparoscope. The patient was then placed into steep trendelenberg position, and the bowel was displaced into the upper abdomen.    Attention was then turned to the bottom field. A vasquez catheter was placed in the bladder. A bivalve speculum was placed in the vagina for adequate visualization of the cervix. The cervix was grasped at the anterior  lip with a single-tooth tenaculum to apply traction, and unsuccessful attempts were made to dilate the cervix for placement of a uterine manipulator, monitoring from above with the laparoscope for perforation. Given inability to dilate the cervix, the tenaculum and speculum were removed, and an EEA sizer wrapped in a sponge and a blue glove was placed in the vagina to help identify the cervicovaginal junction.    Attention was turned back to the laparoscopic field. An 11 mm suprapubic port was placed under direct visualization with the laparoscope. Hysterectomy and bilaterals salpingo-oophorectomy were then performed. The bilateral round ligaments were then cauterized and divided. The retroperitoneal space was opened bilaterally for adequate visualization of the bilateral infundibulopelvic ligaments and ureters. The bilateral infundibulopelvic ligaments were skeletonized, cauterized and divided, with care taken to preserve the underlying ureters. The peritoneal incision was then extended toward the uterus, and a bladder flap was created, taking the bladder flap down off the lower uterine segment and cervix. The bilateral uterine arteries were then skeletonized, cauterized and divided down to the level of the cervicovaginal junction. A colpotomy incision was then made using electrocautery, using the EEA size to identify the cervicovaginal junction. The entire specimen was then delivered through the vagina. A pneumoballoon was placed in the vagina to maintain pneumoperitoneum.    The vaginal cuff was closed with a running continuous stitch of 0-polysorb suture using an Endostitch device, with care taken to include the cuff angles as well as the vaginal mucosa. The pelvis was then irrigated, and the vaginal cuff, all vascular pedicles and dissection sites were observed to be hemostatic. Hemostasis was maintained when the intraperitoneal pressure was decreased. The bilateral ureters were visualized retroperitoneally,  and found to be vermiculating normally, and without evidence of injury. 20 mL of 0.25% bupivacaine was instilled in the intraperitoneally cavity.     The fascia at the supraumbilical port site was closed with a figure-of-eight stitch of 0-vicryl using the Douglas Greg device. The remaining ports were then removed. The subcutaneous tissue at the 4 port sites was irrigated. The subcutaneous tissue at all 4 port sites was closed with interrupted stitches of 3-0 vicryl. The skin at all 4 port sites was closed with exofin glue. Local anesthetic was administered pre-peritoneally at all 4 port sites.    The patient tolerated the procedure well. Sponge, instrument and needle count was correct at the end of the procedure. The patient was extubated in the operating room and taken in stable condition to the PACU.        Francesca Emerson MD, MS, FACOG, FACS  9/25/2023  12:09 PM

## 2023-09-25 NOTE — ANESTHESIA PREPROCEDURE EVALUATION
Anesthesia Pre-Procedure Evaluation    Patient: Rajwinder Lama   MRN: [id number] : 1955        Procedure : Procedure(s):  Pelvic exam under anesthesia, total laparoscopic hysterectomy, bilateral salpingo-oophorectomy.     For Cervical high risk HPV (human papillomavirus) test positive [R87.810];Severe dysplasia of cervix (ZO III) [D06.9]          Past Medical History:   Diagnosis Date    ZO III (cervical intraepithelial neoplasia III) 12/15/2017    Hypertension     ((Pt Qnr Sub: patient does have hypertension))       Past Surgical History:   Procedure Laterality Date    CYSTECTOMY OVARIAN BENIGN N/A     gall stone      gallbladder remains    LEEP TX, CERVICAL  2018    For cervical HSIL      Allergies   Allergen Reactions    Seasonal Allergies       Social History     Tobacco Use    Smoking status: Never    Smokeless tobacco: Never   Substance Use Topics    Alcohol use: Not Currently     Comment: ethipian drink rarely      Wt Readings from Last 1 Encounters:   23 79 kg (174 lb 1.6 oz)        Anesthesia Evaluation   Pt has had prior anesthetic. Type: General.    No history of anesthetic complications       ROS/MED HX  ENT/Pulmonary:    (-) tobacco use, asthma and sleep apnea   Neurologic:     (+)    no peripheral neuropathy                         (-) no seizures and no CVA   Cardiovascular:     (+)  hypertension- -   -  - -                                   (-) CAD and arrhythmias   METS/Exercise Tolerance:     Hematologic:       Musculoskeletal:       GI/Hepatic:    (-) GERD   Renal/Genitourinary: Comment: Cervical high risk HPV (human papillomavirus) test positive [R87.810];Severe dysplasia of cervix (ZO III) [D06.9]     (-) renal disease   Endo:    (-) Type II DM and thyroid disease   Psychiatric/Substance Use:       Infectious Disease:    (-) Recent Fever   Malignancy:       Other:            Physical Exam    Airway  airway exam normal      Mallampati: II   TM distance: > 3 FB   Neck ROM:  full   Mouth opening: > 3 cm    Respiratory Devices and Support         Dental       (+) Minor Abnormalities - some fillings, tiny chips      Cardiovascular   cardiovascular exam normal          Pulmonary   pulmonary exam normal                OUTSIDE LABS:  CBC:   Lab Results   Component Value Date    WBC 6.3 09/25/2023    WBC 5.9 08/27/2020    HGB 13.2 09/25/2023    HGB 13.2 08/27/2020    HCT 38.6 09/25/2023    HCT 40.2 08/27/2020     09/25/2023     08/27/2020     BMP:   Lab Results   Component Value Date     05/16/2023     04/13/2022    POTASSIUM 4.1 05/16/2023    POTASSIUM 3.5 04/13/2022    CHLORIDE 106 05/16/2023    CHLORIDE 108 04/13/2022    CO2 28 05/16/2023    CO2 28 04/13/2022    BUN 14.7 05/16/2023    BUN 13 04/13/2022    CR 0.58 05/16/2023    CR 0.55 04/13/2022    GLC 98 05/16/2023    GLC 86 04/13/2022     COAGS:   Lab Results   Component Value Date    INR 1.01 01/04/2019     POC: No results found for: BGM, HCG, HCGS  HEPATIC:   Lab Results   Component Value Date    ALBUMIN 3.6 08/25/2020    PROTTOTAL 7.2 08/25/2020    ALT 25 08/25/2020    AST 20 08/25/2020    ALKPHOS 56 08/25/2020    BILITOTAL 0.5 08/25/2020     OTHER:   Lab Results   Component Value Date    DANITA 9.5 05/16/2023    MAG 2.1 01/04/2019    TSH 1.23 01/04/2019       Anesthesia Plan    ASA Status:  2    NPO Status:  NPO Appropriate    Anesthesia Type: General.     - Airway: ETT   Induction: Intravenous.   Maintenance: TIVA.        Consents    Anesthesia Plan(s) and associated risks, benefits, and realistic alternatives discussed. Questions answered and patient/representative(s) expressed understanding.     - Discussed:     - Discussed with:  Patient            Postoperative Care    Pain management: IV analgesics, Oral pain medications.   PONV prophylaxis: Ondansetron (or other 5HT-3), Dexamethasone or Solumedrol, Background Propofol Infusion     Comments:                Timothy Staton MD

## 2023-09-25 NOTE — ANESTHESIA POSTPROCEDURE EVALUATION
Patient: Rajwinder Lama    Procedure: Procedure(s):  Pelvic exam under anesthesia, total laparoscopic hysterectomy, bilateral salpingo-oophorectomy.        Anesthesia Type:  General    Note:  Disposition: Outpatient   Postop Pain Control: Uneventful            Sign Out: Well controlled pain   PONV: No   Neuro/Psych: Uneventful            Sign Out: Acceptable/Baseline neuro status   Airway/Respiratory: Uneventful            Sign Out: Acceptable/Baseline resp. status   CV/Hemodynamics: Uneventful            Sign Out: Acceptable CV status   Other NRE: NONE   DID A NON-ROUTINE EVENT OCCUR? No           Last vitals:  Vitals Value Taken Time   /75 09/25/23 1415   Temp     Pulse 51 09/25/23 1419   Resp 18 09/25/23 1419   SpO2 94 % 09/25/23 1419   Vitals shown include unvalidated device data.    Electronically Signed By: Timothy Staton MD  September 25, 2023  4:02 PM

## 2023-09-25 NOTE — TELEPHONE ENCOUNTER
Called Rajwinder's daughter Angie per Rajwinder's request. Reviewed the intraoperative findings and procedure performed. Plan for discharge to home today when meeting PACU criteria.      Francesca Emerson MD, MS, FACOG, FACS  9/25/2023  12:24 PM

## 2023-09-25 NOTE — ANESTHESIA CARE TRANSFER NOTE
Patient: Rajwinder Lama    Procedure: Procedure(s):  Pelvic exam under anesthesia, total laparoscopic hysterectomy, bilateral salpingo-oophorectomy.        Diagnosis: Cervical high risk HPV (human papillomavirus) test positive [R87.810]  Severe dysplasia of cervix (ZO III) [D06.9]  Diagnosis Additional Information: No value filed.    Anesthesia Type:   General     Note:    Oropharynx: spontaneously breathing  Level of Consciousness: drowsy  Oxygen Supplementation: face mask  Level of Supplemental Oxygen (L/min / FiO2): 6  Independent Airway: airway patency satisfactory and stable  Dentition: dentition unchanged  Vital Signs Stable: post-procedure vital signs reviewed and stable    Patient transferred to: PACU  Comments: Neuromuscular blockade reversed with sugammadex, spontaneous respirations, adequate tidal volumes, followed commands to voice, oropharynx suctioned with soft flexible catheter, extubated atraumatically, extubated with suction, airway patent after extubation.  Oxygen via facemask at 6 liters per minute to PACU. Oxygen tubing connected to wall O2 in PACU, SpO2, NiBP, and EKG monitors and alarms on and functioning, Kvng Hugger warmer connected to patient gown, report on patient's clinical status given to PACU RN, RN questions answered.         Handoff Report: Identifed the Patient, Identified the Reponsible Provider, Reviewed the pertinent medical history, Discussed the surgical course, Reviewed Intra-OP anesthesia mangement and issues during anesthesia, Set expectations for post-procedure period and Allowed opportunity for questions and acknowledgement of understanding      Vitals:  Vitals Value Taken Time   BP     Temp     Pulse 60 09/25/23 1219   Resp 22 09/25/23 1219   SpO2 100 % 09/25/23 1219   Vitals shown include unvalidated device data.    Electronically Signed By: CHRISTIN Ambrocio CRNA  September 25, 2023  12:20 PM

## 2023-09-25 NOTE — H&P
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 NAZARIO.     She is feeling well today and denies recent illness, F/C, loss of taste/smell, cough, sore throat, chest pain, shortness of breath, nausea/vomiting. Her health has not changed since her last visit with Dr. Emerson on 2023. She denies history of intolerance of anesthesia.     PMH:  Past Medical History:   Diagnosis Date    ZO III (cervical intraepithelial neoplasia III) 12/15/2017    Hypertension     ((Pt Qnr Sub: patient does have hypertension))        PSH:  Past Surgical History:   Procedure Laterality Date    CYSTECTOMY OVARIAN BENIGN N/A     gall stone      gallbladder remains    LEEP TX, CERVICAL  2018    For cervical HSIL       Medications:  No current facility-administered medications on file prior to encounter.  Calcium Carb-Cholecalciferol (CALCIUM 500 +D PO), Take 1 tablet by mouth daily  lisinopril-hydrochlorothiazide (ZESTORETIC) 20-12.5 MG tablet, TAKE 1 TAB BY MOUTH DAILY NEEDS APPOINTMENT FOR BLOOD PRESSURE RECHECK BEFORE ANY FURTHER REFILLS        Social History:  Social History     Tobacco Use    Smoking status: Never    Smokeless tobacco: Never   Vaping Use    Vaping Use: Never used   Substance Use Topics    Alcohol use: Not Currently     Comment: ethipian drink rarely    Drug use: No       Family History:  Family History   Problem Relation Age of Onset    Coronary Artery Disease Mother     Hypertension Mother     Hypertension Father     Glaucoma No family hx of     Macular Degeneration No family hx of        Allergies:  Allergies   Allergen Reactions    Seasonal Allergies        Physical Exam    BP (!) 197/75   Pulse 64   " Temp 98  F (36.7  C) (Temporal)   Resp 16   Ht 1.575 m (5' 2\")   Wt 79 kg (174 lb 1.6 oz)   SpO2 99%   BMI 31.84 kg/m      Gen: NAD, sitting in bed  CV: RRR, no m/r/g  Resp: NWOB, CTAB  Abd: Soft, nontender, nondistended  Ext: Warm, well perfused, trace edema  Neuro: Moving all 4 extremeities    Labs:    Results for orders placed or performed during the hospital encounter of 23   Basic metabolic panel     Status: Abnormal   Result Value Ref Range    Sodium 143 136 - 145 mmol/L    Potassium 4.1 3.4 - 5.3 mmol/L    Chloride 103 98 - 107 mmol/L    Carbon Dioxide (CO2) 29 22 - 29 mmol/L    Anion Gap 11 7 - 15 mmol/L    Urea Nitrogen 18.4 8.0 - 23.0 mg/dL    Creatinine 0.63 0.51 - 0.95 mg/dL    Calcium 10.0 8.8 - 10.2 mg/dL    Glucose 111 (H) 70 - 99 mg/dL    GFR Estimate >90 >60 mL/min/1.73m2   CBC with platelets     Status: Normal   Result Value Ref Range    WBC Count 6.3 4.0 - 11.0 10e3/uL    RBC Count 4.43 3.80 - 5.20 10e6/uL    Hemoglobin 13.2 11.7 - 15.7 g/dL    Hematocrit 38.6 35.0 - 47.0 %    MCV 87 78 - 100 fL    MCH 29.8 26.5 - 33.0 pg    MCHC 34.2 31.5 - 36.5 g/dL    RDW 11.9 10.0 - 15.0 %    Platelet Count 177 150 - 450 10e3/uL   Adult Type and Screen     Status: None   Result Value Ref Range    ABO/RH(D) B POS     Antibody Screen Negative Negative    SPECIMEN EXPIRATION DATE [id number]    ABO/Rh type and screen     Status: None    Narrative    The following orders were created for panel order ABO/Rh type and screen.  Procedure                               Abnormality         Status                     ---------                               -----------         ------                     Adult Type and Screen[[id number]]                            Final result                 Please view results for these tests on the individual orders.         Assessment/Plan:  Rajwinder Lama is a 68 year old  with PMH of cervical HSIL (CIN2-3)  who presents for scheduled TLH, BSO with Dr. Emerson. " Her health is unchanged from recent visit with Dr. Emerson on 7/6/2023.      Plan to proceed with procedure as scheduled. Informed consent signed and all questions answered. This is a same day procedure and patient will be discharged to home following recovery from anesthesia.     Discussed with Dr. Emerson.    Ravi Burkett MD, MPH  Ob/Gyn Resident, PGY-3  09/25/23 10:06 AM     I have seen and examined the patient.  I have reviewed and edited Dr. Burkett's note above.    Francesca Emerson MD, MS, FACOG, FACS  9/25/2023  12:35 PM

## 2023-09-25 NOTE — ANESTHESIA PROCEDURE NOTES
Airway       Patient location: [facility] - Operating Room or Procedural Area.       Procedure Start/Stop Times: 9/25/2023 10:23 AM  Staff -        Anesthesiologist:  Timothy Staton MD       CRNA: Sunshine Pierson APRN CRNA       Performed By: CRNAIndications and Patient Condition       Indications for airway management: kelli-procedural and airway protection       Induction type:intravenous       Mask difficulty assessment: 1 - vent by mask    Final Airway Details       Final airway type: endotracheal airway       Successful airway: ETT - single  Endotracheal Airway Details        ETT size (mm): 7.0       Cuffed: yes       Successful intubation technique: direct laryngoscopy       DL Blade Type: Hernandez 2       Grade View of Cords: 1       Adjucts: stylet       Position: Left       Measured from: gums/teeth       Secured at (cm): 22       Bite block used: None    Post intubation assessment        Placement verified by: capnometry, equal breath sounds and chest rise        Number of attempts at approach: 1       Number of other approaches attempted: 0       Secured with: pink tape and plastic tape       Ease of procedure: easy       Dentition: Intact and Unchanged    Medication(s) Administered   Medication Administration Time: 9/25/2023 10:23 AM

## 2023-09-26 PROCEDURE — 88307 TISSUE EXAM BY PATHOLOGIST: CPT | Mod: 26 | Performed by: STUDENT IN AN ORGANIZED HEALTH CARE EDUCATION/TRAINING PROGRAM

## 2023-09-27 ENCOUNTER — APPOINTMENT (OUTPATIENT)
Dept: OPTOMETRY | Facility: CLINIC | Age: 68
End: 2023-09-27
Payer: COMMERCIAL

## 2023-09-27 PROCEDURE — 92341 FIT SPECTACLES BIFOCAL: CPT | Performed by: OPTOMETRIST

## 2023-09-27 NOTE — RESULT ENCOUNTER NOTE
I have personally reviewed the pathology results which support a diagnosis of normal uterus/cervix/bilateral ovaries & fallopian tubes.  No cervical dysplasia/cancer.       Francesca Emerson MD, MS, FACOG, FACS  9/27/2023  12:12 PM

## 2023-09-29 ENCOUNTER — TELEPHONE (OUTPATIENT)
Dept: ONCOLOGY | Facility: CLINIC | Age: 68
End: 2023-09-29
Payer: MEDICARE

## 2023-09-29 NOTE — TELEPHONE ENCOUNTER
Pt's daughter, Josefina, called in today to schedule post op appt.    She also had questions about post op restrictions re: lifting after surgery.    Per Lemuel Santamaria RNCC:    [9:52 AM] Lemuel Santamaria.  With any laparoscopic gyn surgeries, we advise no lifting, pushing, or pulling >15-20 lbs for 6 weeks after surgery.  Also no using the core muscles during that time period (no yoga, pilates, weight lifting, swimming, etc).  And no straining.    Pt's daughter was notified with this recommendation. States that pt is doing well after surgery.    Patient's daughter verbalized understanding and agreement with plan.  She was instructed to call the clinic with any questions, concerns, or worsening symptoms.     Mehnaz Richard RN on 9/29/2023 at 10:07 AM

## 2023-10-30 NOTE — PATIENT INSTRUCTIONS
SCHEDULING:  None.      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  Treatment History:  -2/14/18: LEEP. No residual dysplasia.  -9/25/23: Pelvic exam under anesthesia, total laparoscopic hysterectomy (removal of uterus/cervix), bilateral salpingo-oophorectomy (removal of bilateral ovaries & fallopian tubes).  No residual dysplasia.      PLAN:  1) History of cervical HSIL: Despite the negative pathology, continued vaginal screening is recommended due to the overall low but increased risk of vaginal HSIL/cancer.   Surveillance as follows:  Vaginal cytology & HPV co-test annually x3, then every 3 years x25 years (until 2042).   Screening with your general gynecologist Dr. Trevizo.     2) Healthcare maintenance: Continue routine healthcare maintenance with your primary care provider.     Please call if you have any questions or concerns. [phone]       Francesca Emerson MD, MS, FACOG, FACS  11/2/2023  12:47 PM

## 2023-10-30 NOTE — PROGRESS NOTES
Follow-up Note on Referred Patient  Gynecologic Oncology HPV Clinic  [facility]      Date of visit: 2023       Referring Provider/Gynecologist:  Dr. Shaw Trevizo MD  [address] [place] [address]       Primary Care Provider:  Bismark Payne Mai  [address] [place] [address]       RE: Rajwinder Lama  : 1955  Pronouns: she/her/hers       Reason for visit: History of cervical HSIL, now s/p definitive hysterectomy.       History of Present Illness:  Rajwinder Lama is a 68 year old Sami-speaking patient referred to the Gynecologic Oncology HPV Clinic for evaluation of persistent hrHPV+ cervical cancer screening, inability to sample the endocervix. History as follows:    *HPV vaccination status: unvaccinated    17: Pap test NILM, hrHPV16+  12/15/17: Ectocervical biopsies 5:00, 7:00, 11:00 and endocervical curettings pathology  HSIL (CIN2-3).  18: LEEP.   -Pathology: Negative for residual HSIL.     19: Pap test NILM, hrHPV16+.   19: Cervical biopsy 3:00 pathology negative for dysplasia/malignancy; Endocervical cuerttings insufficient for evaluation.    22: Pap test NILM, hrHPV16+, non-16/18 hrHPV+.   22: Endocervical curettings pathology LSIL (CIN1), although definitive endocervical mucosa not identified.     23: Pap test NILM, non-16/18 hrHPV+.   23: Pelvic exam under anesthesia, total laparoscopic hysterectomy, bilateral salpingo-oophorectomy.    -Pathology: Cervix negative for dysplasia/cancer.       Subjective:  Rajwinder returns to the gyn onc HPV clinic for a scheduled follow-up visit, accompanied by her daughter.   She is recovering well postoperatively. Pain well-controlled. Normal appetite. Normal bowel/bladder habits. No vaginal bleeding except for some spotting the day of surgery. No chest pain/shortness of breath. No lower extremity edema. No incisional erythema or drainage. No fevers/chills.        Past Medical  History:  Past Medical History:   Diagnosis Date    ZO III (cervical intraepithelial neoplasia III) 12/15/2017    Hypertension     ((Pt Qnr Sub: patient does have hypertension))        Past Surgical History:  Past Surgical History:   Procedure Laterality Date    CYSTECTOMY OVARIAN BENIGN N/A     gall stone      gallbladder remains    LAPAROSCOPIC HYSTERECTOMY TOTAL, BILATERAL SALPINGO-OOPHORECTOMY, COMBINED Bilateral 9/25/2023    Procedure: Pelvic exam under anesthesia, total laparoscopic hysterectomy, bilateral salpingo-oophorectomy. ;  Surgeon: Francesca Emerson MD;  Location: [facility] OR    LEEP TX, CERVICAL  02/14/2018    For cervical HSIL       Current Medications:   Current Outpatient Medications   Medication    acetaminophen (TYLENOL) 325 MG tablet    Calcium Carb-Cholecalciferol (CALCIUM 500 +D PO)    ibuprofen (ADVIL/MOTRIN) 600 MG tablet    lisinopril-hydrochlorothiazide (ZESTORETIC) 20-25 MG tablet    oxyCODONE (ROXICODONE) 5 MG tablet    senna-docusate (SENOKOT-S/PERICOLACE) 8.6-50 MG tablet    lisinopril-hydrochlorothiazide (ZESTORETIC) 20-12.5 MG tablet     No current facility-administered medications for this visit.        Allergies:   Allergies   Allergen Reactions    Seasonal Allergies          Social History:  Patient lives with her daughter and son-in-law. Works at [employer]. No activity limitations. She does not have Advanced Directives, but has identified her daughter Josefina Lange as her desired Healthcare Power of .    Social History     Tobacco Use    Smoking status: Never    Smokeless tobacco: Never   Substance Use Topics    Alcohol use: Not Currently     Comment: ethipian drink rarely       History   Drug Use No       Family History:   No known family history of breast, ovarian, uterine, colon, urothelial/renal, prostate or pancreatic cancers.  No known family history of melanoma.   Family History   Problem Relation Age of Onset    Coronary Artery Disease Mother      Hypertension Mother     Hypertension Father     Glaucoma No family hx of     Macular Degeneration No family hx of        Physical Exam:   /68 (BP Location: Right arm, Patient Position: Sitting, Cuff Size: Adult Large)   Pulse 60   Temp 98.1  F (36.7  C) (Oral)   Resp 18   Wt 81.8 kg (180 lb 6.4 oz)   SpO2 98%   BMI 33.00 kg/m    Body mass index is 33 kg/m .    General Appearance: healthy and alert, no distress     Musculoskeletal: extremities without edema    Skin: no lesions or rashes     Neurological: normal gait, no gross defects     Psychiatric: appropriate mood and affect              Gastrointestinal:       abdomen soft, non-tender, non-distended. Laparoscopic incisions x4 clean, dry and intact, no erythema, drainage or others signs of infection.         Laboratory Examination:   I have independently reviewed the 9/25/23 surgical pathology results detailed in the HPI.       Performance Status:  ECOG Grade 0.       Assessment:  Rajwinder Lama (she/her/hers)  is a 68 year old Luxembourgish-speaking patient with a history of cervical HSIL 2017 with persistent hrHPV+ cervical cancer screening now s/p definitive hysterectomy with no evidence of residual dysplasia/cancer.   =Recovering well postoperatively.         Plan:      1.)    History of cervical HSIL: I reviewed the pathology results with Rajwinder and provided her with a copy of the pathology report; she was also previously provided with a copy of the pathology report via [email or web address]. Discussed recommendation for continued vaginal screening given the low but increased risk of vaginal HSIL/cancer.     Surveillance as follows: vaginal cytology & HPV co-test annually x3, then every 3 years x25 years after diagnosis of cervical HSIL (until 2042).   Rajwinder will follow-up with her general gynecologist Dr. Trevizo for vaginal screening.      2.) Genetic risk factors were assessed and the patient does not meet the qualifications for a referral.      3.) Labs  and/or tests ordered include:  None.     4.) Health maintenance issues addressed today include continuing routine healthcare maintenance with her primary care provider.  -Vaginal screening per #1 above.     5.) Code status:  Full-code.    6.) Prescriptions: None.      Entire visit conducted with the aid of her daughter serving as Swedish  per patient preference.         A total of 10 minutes was spent with the patient, 9 minutes of which were spent in counseling the patient and/or treatment planning, 1 minutes of which were spent in postoperative care/counseling; an additional 5 minutes was spent in chart review/documentation.      Francesca Emerson MD, MS, FACOG, FACS  11/2/2023  12:47 PM          CC  Patient Care Team:  Bismark Payne Mai, MD as PCP - General (Internal Medicine)  Rin Jameson OD as Assigned Surgical Provider  Cheyenne Trevizo, [facility] as Assigned [place] Pharmacist  Paloma Adam RN as Personal Advocate & Liaison (PAL)  Pamela Melchor PA-C as Assigned PCP  Idania, Francesca Villegas MD as MD (Gynecologic Oncology)  Shaw Trevizo MD as MD (OB/Gyn)  Shaw Trevizo MD as Assigned OBGYN Provider  Francesca Emerson MD as Assigned Cancer Care Provider  SHAW TREVIZO

## 2023-11-02 ENCOUNTER — ONCOLOGY VISIT (OUTPATIENT)
Dept: ONCOLOGY | Facility: CLINIC | Age: 68
End: 2023-11-02
Attending: OBSTETRICS & GYNECOLOGY
Payer: COMMERCIAL

## 2023-11-02 VITALS
HEART RATE: 60 BPM | SYSTOLIC BLOOD PRESSURE: 128 MMHG | RESPIRATION RATE: 18 BRPM | WEIGHT: 180.4 LBS | OXYGEN SATURATION: 98 % | DIASTOLIC BLOOD PRESSURE: 68 MMHG | TEMPERATURE: 98.1 F | BODY MASS INDEX: 33 KG/M2

## 2023-11-02 DIAGNOSIS — D06.9 CIN III WITH SEVERE DYSPLASIA: Primary | ICD-10-CM

## 2023-11-02 PROCEDURE — G0463 HOSPITAL OUTPT CLINIC VISIT: HCPCS | Performed by: OBSTETRICS & GYNECOLOGY

## 2023-11-02 PROCEDURE — 99213 OFFICE O/P EST LOW 20 MIN: CPT | Mod: 24 | Performed by: OBSTETRICS & GYNECOLOGY

## 2023-11-02 ASSESSMENT — PAIN SCALES - GENERAL: PAINLEVEL: NO PAIN (0)

## 2023-11-02 NOTE — LETTER
2023         RE: Rajwinder Lama  [address]  [place] [address]        Dear Colleague,    Thank you for referring your patient, Rajwinder Lama, to the [facility]. Please see a copy of my visit note below.    Follow-up Note on Referred Patient  Gynecologic Oncology HPV Clinic  [facility]      Date of visit: 2023       Referring Provider/Gynecologist:  Dr. Shaw Trevizo MD  [address] [place] [address]       Primary Care Provider:  Bismark Payne Mai  [address] [place] [address]       RE: Rajwinder Lama  : 1955  Pronouns: she/her/hers       Reason for visit: History of cervical HSIL, now s/p definitive hysterectomy.       History of Present Illness:  Rajwinder Lama is a 68 year old Maltese-speaking patient referred to the Gynecologic Oncology HPV Clinic for evaluation of persistent hrHPV+ cervical cancer screening, inability to sample the endocervix. History as follows:    *HPV vaccination status: unvaccinated    17: Pap test NILM, hrHPV16+  12/15/17: Ectocervical biopsies 5:00, 7:00, 11:00 and endocervical curettings pathology  HSIL (CIN2-3).  18: LEEP.   -Pathology: Negative for residual HSIL.     19: Pap test NILM, hrHPV16+.   19: Cervical biopsy 3:00 pathology negative for dysplasia/malignancy; Endocervical cuerttings insufficient for evaluation.    22: Pap test NILM, hrHPV16+, non-16/18 hrHPV+.   22: Endocervical curettings pathology LSIL (CIN1), although definitive endocervical mucosa not identified.     23: Pap test NILM, non-16/18 hrHPV+.   23: Pelvic exam under anesthesia, total laparoscopic hysterectomy, bilateral salpingo-oophorectomy.    -Pathology: Cervix negative for dysplasia/cancer.       Subjective:  Rajwinder returns to the gyn onc HPV clinic for a scheduled follow-up visit, accompanied by her daughter.   She is recovering well postoperatively. Pain  well-controlled. Normal appetite. Normal bowel/bladder habits. No vaginal bleeding except for some spotting the day of surgery. No chest pain/shortness of breath. No lower extremity edema. No incisional erythema or drainage. No fevers/chills.        Past Medical History:  Past Medical History:   Diagnosis Date     ZO III (cervical intraepithelial neoplasia III) 12/15/2017     Hypertension     ((Pt Qnr Sub: patient does have hypertension))        Past Surgical History:  Past Surgical History:   Procedure Laterality Date     CYSTECTOMY OVARIAN BENIGN N/A      gall stone      gallbladder remains     LAPAROSCOPIC HYSTERECTOMY TOTAL, BILATERAL SALPINGO-OOPHORECTOMY, COMBINED Bilateral 9/25/2023    Procedure: Pelvic exam under anesthesia, total laparoscopic hysterectomy, bilateral salpingo-oophorectomy. ;  Surgeon: Francesca Emerson MD;  Location:  OR     LEEP TX, CERVICAL  02/14/2018    For cervical HSIL       Current Medications:   Current Outpatient Medications   Medication     acetaminophen (TYLENOL) 325 MG tablet     Calcium Carb-Cholecalciferol (CALCIUM 500 +D PO)     ibuprofen (ADVIL/MOTRIN) 600 MG tablet     lisinopril-hydrochlorothiazide (ZESTORETIC) 20-25 MG tablet     oxyCODONE (ROXICODONE) 5 MG tablet     senna-docusate (SENOKOT-S/PERICOLACE) 8.6-50 MG tablet     lisinopril-hydrochlorothiazide (ZESTORETIC) 20-12.5 MG tablet     No current facility-administered medications for this visit.        Allergies:   Allergies   Allergen Reactions     Seasonal Allergies          Social History:  Patient lives with her daughter and son-in-law. Works at [employer]. No activity limitations. She does not have Advanced Directives, but has identified her daughter Josefina Lange as her desired Healthcare Power of .    Social History     Tobacco Use     Smoking status: Never     Smokeless tobacco: Never   Substance Use Topics     Alcohol use: Not Currently     Comment: ethipian drink rarely        History   Drug Use No       Family History:   No known family history of breast, ovarian, uterine, colon, urothelial/renal, prostate or pancreatic cancers.  No known family history of melanoma.   Family History   Problem Relation Age of Onset     Coronary Artery Disease Mother      Hypertension Mother      Hypertension Father      Glaucoma No family hx of      Macular Degeneration No family hx of        Physical Exam:   /68 (BP Location: Right arm, Patient Position: Sitting, Cuff Size: Adult Large)   Pulse 60   Temp 98.1  F (36.7  C) (Oral)   Resp 18   Wt 81.8 kg (180 lb 6.4 oz)   SpO2 98%   BMI 33.00 kg/m    Body mass index is 33 kg/m .    General Appearance: healthy and alert, no distress     Musculoskeletal: extremities without edema    Skin: no lesions or rashes     Neurological: normal gait, no gross defects     Psychiatric: appropriate mood and affect              Gastrointestinal:       abdomen soft, non-tender, non-distended. Laparoscopic incisions x4 clean, dry and intact, no erythema, drainage or others signs of infection.         Laboratory Examination:   I have independently reviewed the 9/25/23 surgical pathology results detailed in the HPI.       Performance Status:  ECOG Grade 0.       Assessment:  Rajwinder Lama (she/her/hers)  is a 68 year old Greenlandic-speaking patient with a history of cervical HSIL 2017 with persistent hrHPV+ cervical cancer screening now s/p definitive hysterectomy with no evidence of residual dysplasia/cancer.   =Recovering well postoperatively.         Plan:      1.)    History of cervical HSIL: I reviewed the pathology results with Rajwinder and provided her with a copy of the pathology report; she was also previously provided with a copy of the pathology report via [employer]. Discussed recommendation for continued vaginal screening given the low but increased risk of vaginal HSIL/cancer.     Surveillance as follows: vaginal cytology & HPV co-test annually x3,  then every 3 years x25 years after diagnosis of cervical HSIL (until 2042).   Rajwinder will follow-up with her general gynecologist Dr. Trevizo for vaginal screening.      2.) Genetic risk factors were assessed and the patient does not meet the qualifications for a referral.      3.) Labs and/or tests ordered include:  None.     4.) Health maintenance issues addressed today include continuing routine healthcare maintenance with her primary care provider.  -Vaginal screening per #1 above.     5.) Code status:  Full-code.    6.) Prescriptions: None.      Entire visit conducted with the aid of her daughter serving as Lao  per patient preference.         A total of 10 minutes was spent with the patient, 9 minutes of which were spent in counseling the patient and/or treatment planning, 1 minutes of which were spent in postoperative care/counseling; an additional 5 minutes was spent in chart review/documentation.      Francesca Emerson MD, MS, FACOG, FACS  11/2/2023  12:47 PM          CC  Patient Care Team:  Bismark Payne Mai, MD as PCP - General (Internal Medicine)  Rin Jameson OD as Assigned Surgical Provider  [facility] as Assigned MTM Pharmacist  Paloma Adam RN as Personal Advocate & Liaison (PAL)  Pamela Melchor PA-C as Assigned PCP  Idania, Francesca Villegas MD as MD (Gynecologic Oncology)  Shaw Treivzo MD as MD (OB/Gyn)  Shaw Trevizo MD as Assigned OBGYN Provider  Idania, Francesca Villegas MD as Assigned Cancer Care Provider  SHAW TREVIZO      Oncology Rooming Note    November 2, 2023 12:33 PM   Rajwinder Lama is a 68 year old female who presents for:    Chief Complaint   Patient presents with     Oncology Clinic Visit     Choroidal nevus of right eye  Severe dysplasia of cervix (ZO III)          Initial Vitals: BP (!) 144/70 (BP Location: Right arm, Patient Position: Sitting, Cuff Size: Adult Large)   Pulse 60   Temp 98.1  F (36.7  C) (Oral)   Resp 18    "Wt 81.8 kg (180 lb 6.4 oz)   SpO2 98%   BMI 33.00 kg/m   Estimated body mass index is 33 kg/m  as calculated from the following:    Height as of 9/25/23: 1.575 m (5' 2\").    Weight as of this encounter: 81.8 kg (180 lb 6.4 oz). Body surface area is 1.89 meters squared.  No Pain (0) Comment: Data Unavailable   No LMP recorded. Patient is postmenopausal.  Allergies reviewed: Yes  Medications reviewed: No    Medications: Medication refills not needed today.  Pharmacy name entered into [employer]: [id number] [place] - [address] AT [facility]    Clinical concerns: no       Jennifer Deleon CMA                Again, thank you for allowing me to participate in the care of your patient.        Sincerely,        Francesca Emerson MD  "

## 2023-11-02 NOTE — PROGRESS NOTES
"Oncology Rooming Note    November 2, 2023 12:33 PM   Rajwinder Lama is a 68 year old female who presents for:    Chief Complaint   Patient presents with    Oncology Clinic Visit     Choroidal nevus of right eye  Severe dysplasia of cervix (ZO III)          Initial Vitals: BP (!) 144/70 (BP Location: Right arm, Patient Position: Sitting, Cuff Size: Adult Large)   Pulse 60   Temp 98.1  F (36.7  C) (Oral)   Resp 18   Wt 81.8 kg (180 lb 6.4 oz)   SpO2 98%   BMI 33.00 kg/m   Estimated body mass index is 33 kg/m  as calculated from the following:    Height as of 9/25/23: 1.575 m (5' 2\").    Weight as of this encounter: 81.8 kg (180 lb 6.4 oz). Body surface area is 1.89 meters squared.  No Pain (0) Comment: Data Unavailable   No LMP recorded. Patient is postmenopausal.  Allergies reviewed: Yes  Medications reviewed: No    Medications: Medication refills not needed today.  Pharmacy name entered into [employer]: [id number] - [place], [place] - [address] AT [facility]    Clinical concerns: no       Jennifer Deleon CMA              "

## 2023-11-02 NOTE — Clinical Note
Jozef Trevizo On 9/25/23 Rajwinder Lama underwent a TLH, BSO for history of cervical HSIL and persistent hrHPV. Pathology was negative for HSIL/cancer, and she is recovering well postoperatively. I discussed vaginal surveillance per ASCCP guidelines, and she will follow-up with you in 1 year to initiate this screening; details are in my note. Please contact me if you have any questions or concerns, or if you disagree with this plan. Thank you for referring such a calin patient.  --darrel Emerson MD, MS, FACOG, FACS 11/2/2023 12:49 PM

## 2023-11-13 ENCOUNTER — PATIENT OUTREACH (OUTPATIENT)
Dept: PEDIATRICS | Facility: CLINIC | Age: 68
End: 2023-11-13
Payer: COMMERCIAL

## 2023-11-13 NOTE — TELEPHONE ENCOUNTER
Patient Quality Outreach Health Maintenance - PAL RN    Summary:    PAL RN contacted pt regarding overdue health maintenance    Patient is due/failing the following:       Topic Date Due    Zoster (Shingles) Vaccine (1 of 2) Never done    Pneumococcal Vaccine (2 - PPSV23 or PCV20) 08/25/2021    Flu Vaccine (1) 09/01/2023    COVID-19 Vaccine (5 - 2023-24 season) 09/01/2023       Health Maintenance Due   Topic Date Due    ZOSTER IMMUNIZATION (1 of 2) Never done    RSV VACCINE (Pregnancy & 60+) (1 - 1-dose 60+ series) Never done    Pneumococcal Vaccine: 65+ Years (2 - PPSV23 or PCV20) 08/25/2021    INFLUENZA VACCINE (1) 09/01/2023    COVID-19 Vaccine (5 - 2023-24 season) 09/01/2023       Type of outreach:    Chart review performed, no outreach needed.    - Advised patient if any questions or concerns comes up to call the PAL RN.   - Patient given opportunity to ask questions and at this time there is nothing further needed.     Questions for provider review:    None                                                Paloma BRITTON RN   Patient Advocate Liaison (PAL)  [facility]   [phone]

## 2023-12-04 ENCOUNTER — PATIENT OUTREACH (OUTPATIENT)
Dept: OBGYN | Facility: CLINIC | Age: 68
End: 2023-12-04
Payer: COMMERCIAL

## 2023-12-04 NOTE — TELEPHONE ENCOUNTER
9/25/23 Hysterectomy with Dr Emerson. Benign Cervix.   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 Rajwinder will follow-up with her general gynecologist Dr. Trevizo for vaginal screening.

## 2024-02-02 ENCOUNTER — OFFICE VISIT (OUTPATIENT)
Dept: FAMILY MEDICINE | Facility: CLINIC | Age: 69
End: 2024-02-02
Payer: MEDICARE

## 2024-02-02 VITALS
RESPIRATION RATE: 16 BRPM | WEIGHT: 180.1 LBS | TEMPERATURE: 98.1 F | DIASTOLIC BLOOD PRESSURE: 80 MMHG | BODY MASS INDEX: 32.94 KG/M2 | HEART RATE: 57 BPM | OXYGEN SATURATION: 96 % | SYSTOLIC BLOOD PRESSURE: 156 MMHG

## 2024-02-02 DIAGNOSIS — D06.9 SEVERE DYSPLASIA OF CERVIX (CIN III): ICD-10-CM

## 2024-02-02 DIAGNOSIS — R35.0 URINARY FREQUENCY: Primary | ICD-10-CM

## 2024-02-02 LAB
ALBUMIN UR-MCNC: NEGATIVE MG/DL
APPEARANCE UR: CLEAR
BACTERIA #/AREA URNS HPF: ABNORMAL /HPF
BILIRUB UR QL STRIP: NEGATIVE
COLOR UR AUTO: YELLOW
ERYTHROCYTE [DISTWIDTH] IN BLOOD BY AUTOMATED COUNT: 12.4 % (ref 10–15)
GLUCOSE UR STRIP-MCNC: NEGATIVE MG/DL
HCT VFR BLD AUTO: 36 % (ref 35–47)
HGB BLD-MCNC: 12.2 G/DL (ref 11.7–15.7)
HGB UR QL STRIP: NEGATIVE
KETONES UR STRIP-MCNC: NEGATIVE MG/DL
LEUKOCYTE ESTERASE UR QL STRIP: ABNORMAL
MCH RBC QN AUTO: 29.5 PG (ref 26.5–33)
MCHC RBC AUTO-ENTMCNC: 33.9 G/DL (ref 31.5–36.5)
MCV RBC AUTO: 87 FL (ref 78–100)
NITRATE UR QL: NEGATIVE
PH UR STRIP: 7.5 [PH] (ref 5–7)
PLATELET # BLD AUTO: 185 10E3/UL (ref 150–450)
RBC # BLD AUTO: 4.13 10E6/UL (ref 3.8–5.2)
RBC #/AREA URNS AUTO: ABNORMAL /HPF
SP GR UR STRIP: 1.02 (ref 1–1.03)
SQUAMOUS #/AREA URNS AUTO: ABNORMAL /LPF
UROBILINOGEN UR STRIP-ACNC: 0.2 E.U./DL
WBC # BLD AUTO: 6 10E3/UL (ref 4–11)
WBC #/AREA URNS AUTO: ABNORMAL /HPF

## 2024-02-02 PROCEDURE — 81001 URINALYSIS AUTO W/SCOPE: CPT | Performed by: GENERAL PRACTICE

## 2024-02-02 PROCEDURE — 99213 OFFICE O/P EST LOW 20 MIN: CPT | Performed by: GENERAL PRACTICE

## 2024-02-02 PROCEDURE — 85027 COMPLETE CBC AUTOMATED: CPT | Performed by: GENERAL PRACTICE

## 2024-02-02 PROCEDURE — 36415 COLL VENOUS BLD VENIPUNCTURE: CPT | Performed by: GENERAL PRACTICE

## 2024-02-02 PROCEDURE — 80048 BASIC METABOLIC PNL TOTAL CA: CPT | Performed by: GENERAL PRACTICE

## 2024-02-02 ASSESSMENT — PAIN SCALES - GENERAL: PAINLEVEL: NO PAIN (0)

## 2024-02-02 NOTE — PROGRESS NOTES
"  Assessment & Plan     Urinary frequency  Denies pain today  - UA Macroscopic with reflex to Microscopic and Culture - Lab Collect; Future  - UA Macroscopic with reflex to Microscopic and Culture - Lab Collect  - UA Microscopic with Reflex to Culture    Severe dysplasia of cervix (ZO III)  H/O   Follow-up with GYN            BMI  Estimated body mass index is 32.94 kg/m  as calculated from the following:    Height as of 9/25/23: 1.575 m (5' 2\").    Weight as of this encounter: 81.7 kg (180 lb 1.6 oz).             Ysabel Purdy is a 69 year old, presenting for the following health issues:  Urinary Problem        2/2/2024    11:00 AM   Additional Questions   Roomed by Lisa Magill, CMA   Accompanied by Son         2/2/2024    11:00 AM   Patient Reported Additional Medications   Patient reports taking the following new medications NONE      ID: [id number]    Follow-up urinary symptom  Frequent urination at night 8-9 x since the hysterectomy 4 months ago.  Frequency is reducing and is concerned about any future problems  Denies dysuria, blood, odor.   Has a gnawing sensation when holding the urine.  Sensation has been the same.     Patient wants an annual check up.  Discussed an annual check up is a separate appointment and family schedule.            Genitourinary - Female  Onset/Duration: since September 2023, after having a hysterectomy surgery   Description:   Painful urination (Dysuria): No, but having leg cramps.            Frequency: YES  Blood in urine (Hematuria): No  Delay in urine (Hesitency): No  Intensity: mild  Progression of Symptoms:  worsening and constant  Accompanying Signs & Symptoms:  Fever/chills: No  Flank pain: YES- right   Nausea and vomiting: No  Vaginal symptoms: none  Abdominal/Pelvic Pain: No  History:   History of frequent UTI s: No  History of kidney stones: No  Sexually Active: No  Possibility of pregnancy: No  Precipitating or alleviating factors: when patient has to " urinate/ after she urinates   Therapies tried and outcome: NOTHING           Review of Systems  Constitutional, HEENT, cardiovascular, pulmonary, gi and gu systems are negative, except as otherwise noted.      Objective    BP (!) 156/80 (BP Location: Right arm, Patient Position: Sitting, Cuff Size: Adult Regular)   Pulse 57   Temp 98.1  F (36.7  C) (Oral)   Resp 16   Wt 81.7 kg (180 lb 1.6 oz)   SpO2 96%   BMI 32.94 kg/m    Body mass index is 32.94 kg/m .  Physical Exam  Constitutional:       Appearance: Normal appearance.   Eyes:      Extraocular Movements: Extraocular movements intact.   Cardiovascular:      Rate and Rhythm: Normal rate and regular rhythm.   Pulmonary:      Effort: Pulmonary effort is normal.      Breath sounds: Normal breath sounds.   Abdominal:      Palpations: Abdomen is soft.      Comments: Soft, NT, no rebound   Musculoskeletal:         General: Normal range of motion.      Cervical back: Normal range of motion.   Skin:     General: Skin is warm.   Neurological:      General: No focal deficit present.      Mental Status: She is alert and oriented to person, place, and time. Mental status is at baseline.   Psychiatric:         Mood and Affect: Mood normal.         Behavior: Behavior normal.         Thought Content: Thought content normal.         Judgment: Judgment normal.                    Signed Electronically by: Maranda Mejias MD

## 2024-02-02 NOTE — NURSING NOTE
"Chief Complaint   Patient presents with    Urinary Problem     Initial BP (!) 156/80 (BP Location: Right arm, Patient Position: Sitting, Cuff Size: Adult Regular)   Pulse 57   Temp 98.1  F (36.7  C) (Oral)   Resp 16   Wt 81.7 kg (180 lb 1.6 oz)   SpO2 96%   BMI 32.94 kg/m   Estimated body mass index is 32.94 kg/m  as calculated from the following:    Height as of 9/25/23: 1.575 m (5' 2\").    Weight as of this encounter: 81.7 kg (180 lb 1.6 oz).  BP completed using cuff size regular long right arm    Lisa Magill, CMA    "

## 2024-02-03 LAB
ANION GAP SERPL CALCULATED.3IONS-SCNC: 9 MMOL/L (ref 7–15)
BUN SERPL-MCNC: 14.7 MG/DL (ref 8–23)
CALCIUM SERPL-MCNC: 9.5 MG/DL (ref 8.8–10.2)
CHLORIDE SERPL-SCNC: 105 MMOL/L (ref 98–107)
CREAT SERPL-MCNC: 0.63 MG/DL (ref 0.51–0.95)
DEPRECATED HCO3 PLAS-SCNC: 30 MMOL/L (ref 22–29)
EGFRCR SERPLBLD CKD-EPI 2021: >90 ML/MIN/1.73M2
GLUCOSE SERPL-MCNC: 94 MG/DL (ref 70–99)
POTASSIUM SERPL-SCNC: 4.2 MMOL/L (ref 3.4–5.3)
SODIUM SERPL-SCNC: 144 MMOL/L (ref 135–145)

## 2024-02-08 ENCOUNTER — PATIENT OUTREACH (OUTPATIENT)
Dept: GERIATRIC MEDICINE | Facility: CLINIC | Age: 69
End: 2024-02-08
Payer: COMMERCIAL

## 2024-02-08 NOTE — LETTER
February 8, 2024    RAJWINDER LONG  [address]  [place] [address]    Dear  Rajwinder,    Welcome to [facility] s MSC+ health program. My name is ZARA Levin. I am your MSC+ care coordinator. You are eligible for Care Coordination through [facility] MSC+ plan.    As your care coordinator, we ll:  Meet to go over your care coordination benefits  Talk about your physical and mental health care needs   Review your preventative care needs  Create a plan that meets your needs with the services you choose    What happens next?  I ll call you soon to introduce myself and tell you more about my role. We ll then plan time to go over your health and safety needs. Our goal is to keep you as healthy and independent as possible.    Soon, you will receive a new MSC+ member identification (ID) card from [facility]. When you receive it, please use this card along with your [place] [facility] Programs card and Prescription Drug Coverage Program card. When you receive, it please use this card where you get your health services. If you have Medicare, you will need to show your Medicare card when you get health services.    The MSC+ care coordination program is voluntary and offered to you at no cost. If you wish to stop being in the care coordination program or have questions, call me at [phone]. If you reach my voicemail, leave a message and your phone number. TTY users, call the [place] [employer] at 495 or [phone] (rdbpjh-ty-kjtesu relay service).    Sincerely,      ZARA Levin  [phone]  [email or web address]    [id number] accepted   [id number]       [id number] (07/2022)

## 2024-02-08 NOTE — PROGRESS NOTES
[facility] Care Coordination Contact    Member became effective with [facility] on 2/1/2024 with [facility] MSC+.  Previous Health Plan:  [facility] Care  Previous Care System:  N/A  Previous care coordinators name and number: N/A  Waiver Type: N/A  No MMIS entries for member  UTF received: No UTF to request  Mailed welcome letter and [facility] When to Contact Your Care Coordinator  Address/Phone discrepancy: N/A  [place]Cho[place]    Charo Alvarado  Care Management Specialist  [facility]  [phone]

## 2024-02-09 ENCOUNTER — OFFICE VISIT (OUTPATIENT)
Dept: URGENT CARE | Facility: URGENT CARE | Age: 69
End: 2024-02-09
Payer: MEDICARE

## 2024-02-09 VITALS
TEMPERATURE: 98.4 F | OXYGEN SATURATION: 100 % | RESPIRATION RATE: 16 BRPM | DIASTOLIC BLOOD PRESSURE: 85 MMHG | SYSTOLIC BLOOD PRESSURE: 161 MMHG | HEART RATE: 64 BPM

## 2024-02-09 DIAGNOSIS — K04.7 DENTAL INFECTION: Primary | ICD-10-CM

## 2024-02-09 PROCEDURE — 99203 OFFICE O/P NEW LOW 30 MIN: CPT | Performed by: PHYSICIAN ASSISTANT

## 2024-02-10 NOTE — PATIENT INSTRUCTIONS
Start taking Augmentin two times per day for dental infection  Take 600mg of ibuprofen three times per day for pain for 1-3 days  Ok to continue with ibuprofen as well  Follow-up with Dentist next week for a recheck    Follow-up sooner if having fever, chills, facial swelling etc

## 2024-02-10 NOTE — PROGRESS NOTES
Assessment & Plan     1. Dental infection  Treatment with medication as below.  Encouraged ibuprofen for the next 1 to 3 days to aid in pain management.  Advise follow-up with a dentist next week for recheck.    Please go to the emergency department if development of facial swelling, fever, chills, worsening symptoms etc.  - amoxicillin-clavulanate (AUGMENTIN) 875-125 MG tablet; Take 1 tablet by mouth 2 times daily for 7 days  Dispense: 14 tablet; Refill: 0    Diagnosis and treatment plan was reviewed with patient and/or family.   We went over any labs or imaging. Discussed worsening symptoms or little to no relief despite treatment plan to follow-up with PCP or return to clinic.  Patient verbalizes understanding. All questions were addressed and answered.     Christy Turner PA-C  [facility] [place]    CHIEF COMPLAINT:   Chief Complaint   Patient presents with    Dental Pain     Yesterday, lower left tooth, swollen, painful-traveling down neck     Subjective     Rajwinder is a 69 year old female who presents to clinic today for evaluation of left-sided dental pain.  Symptoms started yesterday.  Noticed in the left lower tooth.  She has had some swelling of her gums.  Pain travels down her neck.  No fever or chills.  No difficulty swallowing or breathing.      Past Medical History:   Diagnosis Date    ZO III (cervical intraepithelial neoplasia III) 12/15/2017    Hypertension     ((Pt Qnr Sub: patient does have hypertension))      Past Surgical History:   Procedure Laterality Date    CYSTECTOMY OVARIAN BENIGN N/A     gall stone      gallbladder remains    LAPAROSCOPIC HYSTERECTOMY TOTAL, BILATERAL SALPINGO-OOPHORECTOMY, COMBINED Bilateral 9/25/2023    Procedure: Pelvic exam under anesthesia, total laparoscopic hysterectomy, bilateral salpingo-oophorectomy. ;  Surgeon: Francesca Emerson MD;  Location:  OR    LEEP TX, CERVICAL  02/14/2018    For cervical HSIL     Social History     Tobacco  Use    Smoking status: Never    Smokeless tobacco: Never   Substance Use Topics    Alcohol use: Not Currently     Comment: ethipian drink rarely     Current Outpatient Medications   Medication    amoxicillin-clavulanate (AUGMENTIN) 875-125 MG tablet    Calcium Carb-Cholecalciferol (CALCIUM 500 +D PO)    lisinopril-hydrochlorothiazide (ZESTORETIC) 20-25 MG tablet     No current facility-administered medications for this visit.     Allergies   Allergen Reactions    Seasonal Allergies        10 point ROS of systems were all negative except for pertinent positives noted in my HPI.      Exam:   BP (!) 161/85   Pulse 64   Temp 98.4  F (36.9  C)   Resp 16   SpO2 100%   Constitutional: healthy, alert and no distress  Head: Normocephalic, atraumatic.  Eyes: conjunctiva clear, no drainage  ENT: TMs clear and shiny anabela, nasal mucosa pink and moist, throat without tonsillar hypertrophy or erythema. NO trismus or drooling.   On the left lower gum line, patient has TTP Slight erythema without swelling.   Neck: neck is supple, no cervical lymphadenopathy or nuchal rigidity  Cardiovascular: RRR  Skin: no rashes  Neurologic:Moves all extremities.    No results found for any visits on 02/09/24.

## 2024-02-29 ENCOUNTER — PATIENT OUTREACH (OUTPATIENT)
Dept: GERIATRIC MEDICINE | Facility: CLINIC | Age: 69
End: 2024-02-29
Payer: COMMERCIAL

## 2024-02-29 NOTE — LETTER
February 29, 2024    RAJWINDER LONG  [address] [place] [address]        Dear Rajwinder:    As a member of [facility]'s MSC+ program, we offer a health risk assessment at no cost to you. I know you don't want to have the assessment right now. If you change your mind, please call me at the number below.    Who performs the health risk assessment?  A [facility] Care Coordinator performs the assessment. Our Care Coordinators can also help you understand your benefits. They can tell you about services to aid you at home, such as managing your care with your doctors if your health worsens.    Our Care Coordinators are here for you if you need:  Support for activities you used to do by yourself (including making meals, bathing and paying bills)  Equipment for bathroom or home safety  Help finding a new place to live  Information on staying healthy, preventing falls and immunizations    Questions?  If you have questions, or you would like to do he assessment, call me at [phone]. TTY users call [phone]. I'm here from 8am to 5pm. I may reach out to you again soon.       Sincerely,         ZARA Levin  [phone]  [email or web address]      \<[id number] accepted  [id number]>    [id number] (21/2021)

## 2024-02-29 NOTE — PROGRESS NOTES
[facility] Care Coordination Contact      [facility] Refusal Telephone Assessment    Member refused home visit HRA on 2/28/2024 (reason: member is on a [employer] fast).    ER visits: No  Hospitalizations: Yes -  [facility] for a planned total hysterectomy without complications.   Health concerns: Declined any major concerns at this time.   Falls/Injuries: No  ADL/IADL Dependencies: Unidentified.      Member currently receiving the following home care services:   None  Member currently receiving the following community resources:  None  Informal support(s):  Rajwinder lives in a single family home with her spouse,  Nazario, daughter, Josefina, and son-in-law Papi. CC introduced self and role to son in law Papi who reports that they received the Care Coordinators welcome letter. Provided information about what sort of support CC can offer.     Declined visit at this time due to a [employer] fast. They will keep CC's number though and contact if they are wanting to schedule a visit earlier than the planned 6 month follow up.     Advanced Care Planning discussion, complete code section.    [facility] sponsored benefits: Shared information re: Silver Sneakers/gym memberships, ASA, Calcium +D.    Follow-Up Plan: Member informed of future contact, plan to f/u with member with a 6 month telephone assessment and offer a home visit.  Contact information shared with member and family, encouraged member to call with any questions or concerns at any time.    This CC note routed to PCP, Bismark Payne Mai.    ZARA Levin  [facility]  [phone]

## 2024-02-29 NOTE — PROGRESS NOTES
"Per CC, mailed client a \"Refusal of Home Visit\" letter.    Charo Alvarado  Care Management Specialist  [facility]  [phone]    "

## 2024-04-04 NOTE — TELEPHONE ENCOUNTER
Health Maintenance Due   Topic Date Due    COVID-19 Vaccine (1) Never done    Pneumococcal Vaccine 0-64 (1 of 2 - PCV) Never done    Colorectal Cancer Screen-  02/01/2020    Diabetes Eye Exam  08/01/2020    Diabetes Foot Exam  04/30/2022    Breast Cancer Screening  03/24/2023    Medicare Advantage- Medicare Wellness Visit  01/01/2024    Diabetes A1C  01/04/2024       Patient is due for topics listed above, she wishes to proceed with Mammogram and MWV (Medicare Wellness Visit), but is not proceeding with Immunization(s) COVID-19 and Pneumococcal, Colorectal Cancer Screening: Colonoscopy, Diabetes A1C, Diabetes Eye Exam, and Diabetes Foot Exam at this time. The following has occurred: MWV and patient will call central scheduling to set up appointment for mammogram.    Routing refill request to provider for review/approval because:  Labs out of range:    BP Readings from Last 3 Encounters:   08/01/23 (!) 150/70   07/06/23 (!) 170/76   06/28/23 (!) 168/90

## 2024-07-09 ENCOUNTER — PATIENT OUTREACH (OUTPATIENT)
Dept: GERIATRIC MEDICINE | Facility: CLINIC | Age: 69
End: 2024-07-09
Payer: COMMERCIAL

## 2024-07-09 NOTE — PROGRESS NOTES
[facility] Care Coordination Contact      [facility] Six-Month Telephone Assessment    6 month telephone assessment completed on 7/9/24.    ER visits: No  Hospitalizations: No  TCU stays: No  Significant health status changes: Rajwinder reported things have been stable and there are no current issues.   Falls/Injuries: No  ADL/IADL changes: No  Changes in services: No    Caregiver Assessment follow up:  N/A    Goals: See POC in chart for goal progress documentation.      Rajwinder declined a visit at this time stating she is doing well and saw no benefit at this time. They have our contact and will reach out if needed.     Will see member in 6 months for an annual health risk assessment.   Encouraged member to call CC with any questions or concerns in the meantime.     SOUTH Levin, Manager   [facility]  [phone]

## 2024-07-13 ENCOUNTER — HEALTH MAINTENANCE LETTER (OUTPATIENT)
Age: 69
End: 2024-07-13

## 2024-09-11 ENCOUNTER — PATIENT OUTREACH (OUTPATIENT)
Dept: OBGYN | Facility: CLINIC | Age: 69
End: 2024-09-11
Payer: COMMERCIAL

## 2024-11-02 DIAGNOSIS — I10 BENIGN ESSENTIAL HYPERTENSION: ICD-10-CM

## 2024-11-02 NOTE — LETTER
November 7, 2024      Rajwinder Lama  [address] [address]        Dear Rajwinder,       We recently received a refill request for your medication - lisinopril-hydrochlorothiazide.   At this time you are due for a visit with your provider about your medications.     You have been given one 30-day refill.  We are unable to fill these medications until the following has been done.    Please schedule an office visit or physical to discuss your medication and get renewal of your prescription..    Please call us at the [facility] - [phone] (or use [employer]) to address the above recommendations.     Thank you for trusting [facility] and we appreciate the opportunity to serve you.  We look forward to supporting your healthcare needs in the future.    Healthy Regards,    Your Health Care Team  [facility]

## 2024-11-05 RX ORDER — LISINOPRIL AND HYDROCHLOROTHIAZIDE 20; 25 MG/1; MG/1
1 TABLET ORAL DAILY
Qty: 30 TABLET | Refills: 0 | Status: SHIPPED | OUTPATIENT
Start: 2024-11-05

## 2024-11-05 NOTE — TELEPHONE ENCOUNTER
Bp was elevated last visit. Needs recheck to ensure meds are correct.  She is due for annual wellness as well  Please assist in scheduling.  One month of bp meds given  Pamela Melchor PA-C on 11/5/2024 at 7:49 AM

## 2024-11-07 NOTE — TELEPHONE ENCOUNTER
Call[facility] and [facility] re: refill request.  Letter sent.  Lillie Jameson, CMA

## 2024-11-13 ENCOUNTER — OFFICE VISIT (OUTPATIENT)
Dept: OBGYN | Facility: CLINIC | Age: 69
End: 2024-11-13
Attending: OBSTETRICS & GYNECOLOGY
Payer: COMMERCIAL

## 2024-11-13 VITALS — WEIGHT: 176.8 LBS | SYSTOLIC BLOOD PRESSURE: 128 MMHG | BODY MASS INDEX: 32.34 KG/M2 | DIASTOLIC BLOOD PRESSURE: 74 MMHG

## 2024-11-13 DIAGNOSIS — Z87.410 HISTORY OF CERVICAL DYSPLASIA: ICD-10-CM

## 2024-11-13 DIAGNOSIS — Z12.31 VISIT FOR SCREENING MAMMOGRAM: ICD-10-CM

## 2024-11-13 DIAGNOSIS — R87.810 CERVICAL HIGH RISK HPV (HUMAN PAPILLOMAVIRUS) TEST POSITIVE: Primary | ICD-10-CM

## 2024-11-13 DIAGNOSIS — R39.15 URINARY URGENCY: ICD-10-CM

## 2024-11-13 LAB
ALBUMIN UR-MCNC: NEGATIVE MG/DL
APPEARANCE UR: ABNORMAL
BACTERIA #/AREA URNS HPF: ABNORMAL /HPF
BILIRUB UR QL STRIP: NEGATIVE
COLOR UR AUTO: YELLOW
GLUCOSE UR STRIP-MCNC: NEGATIVE MG/DL
HGB UR QL STRIP: NEGATIVE
KETONES UR STRIP-MCNC: NEGATIVE MG/DL
LEUKOCYTE ESTERASE UR QL STRIP: ABNORMAL
MUCOUS THREADS #/AREA URNS LPF: PRESENT /LPF
NITRATE UR QL: NEGATIVE
PH UR STRIP: 5.5 [PH] (ref 5–7)
RBC #/AREA URNS AUTO: ABNORMAL /HPF
SP GR UR STRIP: 1.02 (ref 1–1.03)
SQUAMOUS #/AREA URNS AUTO: ABNORMAL /LPF
UROBILINOGEN UR STRIP-ACNC: 0.2 E.U./DL
WBC #/AREA URNS AUTO: ABNORMAL /HPF

## 2024-11-13 PROCEDURE — 99213 OFFICE O/P EST LOW 20 MIN: CPT | Performed by: OBSTETRICS & GYNECOLOGY

## 2024-11-13 PROCEDURE — 87624 HPV HI-RISK TYP POOLED RSLT: CPT | Performed by: OBSTETRICS & GYNECOLOGY

## 2024-11-13 PROCEDURE — G2211 COMPLEX E/M VISIT ADD ON: HCPCS | Performed by: OBSTETRICS & GYNECOLOGY

## 2024-11-13 PROCEDURE — 81001 URINALYSIS AUTO W/SCOPE: CPT | Performed by: OBSTETRICS & GYNECOLOGY

## 2024-11-13 PROCEDURE — 87086 URINE CULTURE/COLONY COUNT: CPT | Performed by: OBSTETRICS & GYNECOLOGY

## 2024-11-13 PROCEDURE — 99459 PELVIC EXAMINATION: CPT | Performed by: OBSTETRICS & GYNECOLOGY

## 2024-11-13 NOTE — PROGRESS NOTES
Assessment & Plan     Cervical high risk HPV (human papillomavirus) test positive  - Due for Pap/HPV co-test of vagina  - HPV and Gynecologic Cytology Panel  - If any positive, refer back to Gyn Onc dysplasia clinic for follow-up.  - If all neg, next Pap/HPV vag cuff due in 1 year w/ plan per Gyn Onc - vaginal cytology & HPV co-test annually x3, then every 3 years x25 years after diagnosis of cervical HSIL (until 2042).     History of cervical dysplasia  - Plan as above  - HPV and Gynecologic Cytology Panel    Urinary urgency  - Will r/o UTI  - UA with Microscopic reflex to Culture - Clinic Collect; Future  - Urine Culture; Future  - If neg UTI, refer to Urogyn    Visit for screening mammogram  - Due for screening  - MA Screen Bilateral w/Kwadwo; Future        Review of the result(s) of each unique test - Pap/HPV/Colpo/Path results as detailed in HPI          No follow-ups on file.    Ysabel Purdy is a 69 year old, presenting for the following health issues:  Gyn Exam (Pelvic & Breast exam  Mammo &  DX Vaginal Pap due   HYST:  9/2023 )    I communicated with Pt via her daughter who acts as Amheric  because Pt speaks no/limited English.       Pt here for Pap/HPV cotest of vaginal cuff. Prior Hx of Pap/Colpos:    08/01/17 NIL, +HPV 16. Plan colp  12/15/17 [place]- ZO 2-3.  Plan: LEEP due by 3/15/18  2/14/18 LEEP- negative. Plan for 1 yr co-test.  02/05/19: NIL pap, + HR HPV type 16 result. Plan [place].    8/21/19 [place]- Negative. Plan 1 yr co-test  1/25/21 Lost to follow-up for pap tracking  4/13/22 NIL, + HR HPV 16 & other. Plan: colposcopy by 7/13/22 5/26/22 [place] [facility] - ZO 1. Plan cotest in 1 year.   5/16/23 NIL Pap, + HR HPV (neg 16/18). [place] due by 8/16/2023 6/28/23 [place] - inadequate due to stenosis and markedly attenuated. Referred to dysplasia clinic.   7/6/23 [place] done - Benign ectocervical squamous tissue fragments only without sampling of the contiguous endocervical glandular tissue from  transformation zone. Plan hysterectomy  Per Dr Emerson OV note.   9/25/23 Hysterectomy with Dr Emerson. Benign Cervix.   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 Rajwinder will follow-up with her general gynecologist Dr. Trevizo for vaginal screening.     Pt also needs breast exam and mammogram. Also c/o some urinary urgency since hysterectomy as well, no dysuria.                       Objective    /74 (BP Location: Right arm, Cuff Size: Adult Regular)   Wt 80.2 kg (176 lb 12.8 oz)   BMI 32.34 kg/m    Body mass index is 32.34 kg/m .  Physical Exam  Chest:   Breasts:     Right: Normal. No swelling, bleeding, inverted nipple, mass, nipple discharge, skin change or tenderness.      Left: Normal. No swelling, bleeding, inverted nipple, mass, nipple discharge, skin change or tenderness.   Lymphadenopathy:      Upper Body:      Right upper body: No axillary adenopathy.      Left upper body: No axillary adenopathy.     Abdomen- Abdomen soft, non-tender. BS normal. No masses, organomegaly, positive findings: obese  Pelvic- Exam chaperoned by nurse, External genitalia atrophic, Bartholin's glands normal, [place]. Maries's glands normal, Urethral meatus normal, Urethra normal, Bladder normal, Vagina with atrophy, no abnormal lesions, no abnormal discharge, Post-hysterectomy status, vaginal cuff well healed, no lesions or abnormal discharge, Anus normal, Vaginal Pap smear was Done,  HPV Done                Signed Electronically by: Shaw Trevizo MD

## 2024-11-13 NOTE — NURSING NOTE
"Chief Complaint   Patient presents with    Gyn Exam     Pelvic & Breast exam  Mammo &  DX Vaginal Pap due   HYST:  2023        Initial /74 (BP Location: Right arm, Cuff Size: Adult Regular)   Wt 80.2 kg (176 lb 12.8 oz)   BMI 32.34 kg/m   Estimated body mass index is 32.34 kg/m  as calculated from the following:    Height as of 23: 1.575 m (5' 2\").    Weight as of this encounter: 80.2 kg (176 lb 12.8 oz).  BP completed using cuff size: regular    Questioned patient about current smoking habits.  Pt. has never smoked.          The following HM Due: pap smear      Gricel Eller, DUSTY on 2024 at 9:05 AM         "

## 2024-11-14 ENCOUNTER — PATIENT OUTREACH (OUTPATIENT)
Dept: CARE COORDINATION | Facility: CLINIC | Age: 69
End: 2024-11-14
Payer: COMMERCIAL

## 2024-11-14 LAB — BACTERIA UR CULT: NO GROWTH

## 2024-11-15 DIAGNOSIS — R39.15 URINARY URGENCY: Primary | ICD-10-CM

## 2024-11-15 LAB
HPV HR 12 DNA CVX QL NAA+PROBE: POSITIVE
HPV16 DNA CVX QL NAA+PROBE: NEGATIVE
HPV18 DNA CVX QL NAA+PROBE: NEGATIVE
HUMAN PAPILLOMA VIRUS FINAL DIAGNOSIS: ABNORMAL

## 2024-11-19 LAB
BKR AP ASSOCIATED HPV REPORT: ABNORMAL
BKR LAB AP GYN ADEQUACY: ABNORMAL
BKR LAB AP GYN INTERPRETATION: ABNORMAL
BKR LAB AP PREVIOUS ABNL DX: ABNORMAL
BKR LAB AP PREVIOUS ABNORMAL: ABNORMAL
PATH REPORT.COMMENTS IMP SPEC: ABNORMAL
PATH REPORT.COMMENTS IMP SPEC: ABNORMAL
PATH REPORT.RELEVANT HX SPEC: ABNORMAL

## 2024-11-20 DIAGNOSIS — R87.619: Primary | ICD-10-CM

## 2024-12-17 ENCOUNTER — ANCILLARY PROCEDURE (OUTPATIENT)
Dept: MAMMOGRAPHY | Facility: CLINIC | Age: 69
End: 2024-12-17
Attending: OBSTETRICS & GYNECOLOGY
Payer: MEDICARE

## 2024-12-17 DIAGNOSIS — Z12.31 VISIT FOR SCREENING MAMMOGRAM: ICD-10-CM

## 2024-12-17 PROCEDURE — 77067 SCR MAMMO BI INCL CAD: CPT | Mod: TC | Performed by: RADIOLOGY

## 2024-12-17 PROCEDURE — 77063 BREAST TOMOSYNTHESIS BI: CPT | Mod: TC | Performed by: RADIOLOGY

## 2024-12-18 ENCOUNTER — PATIENT OUTREACH (OUTPATIENT)
Dept: CARE COORDINATION | Facility: CLINIC | Age: 69
End: 2024-12-18
Payer: MEDICARE

## 2025-01-04 DIAGNOSIS — I10 BENIGN ESSENTIAL HYPERTENSION: ICD-10-CM

## 2025-01-06 ENCOUNTER — PATIENT OUTREACH (OUTPATIENT)
Dept: GERIATRIC MEDICINE | Facility: CLINIC | Age: 70
End: 2025-01-06

## 2025-01-06 ENCOUNTER — OFFICE VISIT (OUTPATIENT)
Dept: PEDIATRICS | Facility: CLINIC | Age: 70
End: 2025-01-06
Payer: COMMERCIAL

## 2025-01-06 VITALS
TEMPERATURE: 98.3 F | HEART RATE: 71 BPM | BODY MASS INDEX: 33.72 KG/M2 | SYSTOLIC BLOOD PRESSURE: 142 MMHG | RESPIRATION RATE: 16 BRPM | OXYGEN SATURATION: 99 % | DIASTOLIC BLOOD PRESSURE: 85 MMHG | WEIGHT: 178.6 LBS | HEIGHT: 61 IN

## 2025-01-06 DIAGNOSIS — I10 BENIGN ESSENTIAL HYPERTENSION: Primary | ICD-10-CM

## 2025-01-06 DIAGNOSIS — I10 BENIGN ESSENTIAL HYPERTENSION: ICD-10-CM

## 2025-01-06 DIAGNOSIS — E87.6 LOW BLOOD POTASSIUM: ICD-10-CM

## 2025-01-06 DIAGNOSIS — R42 DIZZINESS: Primary | ICD-10-CM

## 2025-01-06 LAB
ANION GAP SERPL CALCULATED.3IONS-SCNC: 9 MMOL/L (ref 7–15)
BUN SERPL-MCNC: 11.3 MG/DL (ref 8–23)
CALCIUM SERPL-MCNC: 9.4 MG/DL (ref 8.8–10.4)
CHLORIDE SERPL-SCNC: 105 MMOL/L (ref 98–107)
CREAT SERPL-MCNC: 0.59 MG/DL (ref 0.51–0.95)
EGFRCR SERPLBLD CKD-EPI 2021: >90 ML/MIN/1.73M2
GLUCOSE SERPL-MCNC: 99 MG/DL (ref 70–99)
HCO3 SERPL-SCNC: 30 MMOL/L (ref 22–29)
POTASSIUM SERPL-SCNC: 3.8 MMOL/L (ref 3.4–5.3)
SODIUM SERPL-SCNC: 144 MMOL/L (ref 135–145)

## 2025-01-06 PROCEDURE — 36415 COLL VENOUS BLD VENIPUNCTURE: CPT | Performed by: PHYSICIAN ASSISTANT

## 2025-01-06 PROCEDURE — 99214 OFFICE O/P EST MOD 30 MIN: CPT | Performed by: PHYSICIAN ASSISTANT

## 2025-01-06 PROCEDURE — 80048 BASIC METABOLIC PNL TOTAL CA: CPT | Performed by: PHYSICIAN ASSISTANT

## 2025-01-06 RX ORDER — LISINOPRIL AND HYDROCHLOROTHIAZIDE 20; 25 MG/1; MG/1
1 TABLET ORAL DAILY
Qty: 30 TABLET | Refills: 0 | Status: SHIPPED | OUTPATIENT
Start: 2025-01-06

## 2025-01-06 ASSESSMENT — PAIN SCALES - GENERAL: PAINLEVEL_OUTOF10: NO PAIN (0)

## 2025-01-06 NOTE — PROGRESS NOTES
[facility] Care Coordination Contact    Called adult son Papi  to schedule annual HRA home visit. HRA has been scheduled for Wednesday, 1/15/2024 at 1: 00pm.  Called Cira Reardon and scheduled an  for the home visit.    ZARA Levin  [facility]  [phone]

## 2025-01-06 NOTE — PATIENT INSTRUCTIONS
Please continue the baby aspirin, 81 mg once daily until you followup with Dr. Panye.  Please seek ER care if symptoms change or worsen in any way.

## 2025-01-06 NOTE — PROGRESS NOTES
"  {PROVIDER CHARTING PREFERENCE:403713}    Subjective   Rajwinder is a 70 year old, presenting for the following health issues:  ER F/U        1/6/2025     7:52 AM   Additional Questions   Roomed by Vanessa VILLARREAL   Accompanied by Son- Violetta         1/6/2025     7:52 AM   Patient Reported Additional Medications   Patient reports taking the following new medications No     HPI       Patient is now feeling and doing fine.   Yesterday she was having difficulty speaking.  Noticed un-usual slowness.  With son, who drove her in.      {Urban[employer]over Picklists (Optional):620934}      Objective    BP (!) 142/85 (BP Location: Left arm, Patient Position: Sitting, Cuff Size: Adult Large)   Pulse 71   Temp 98.3  F (36.8  C) (Oral)   Resp 16   Ht 1.549 m (5' 1\")   Wt 81 kg (178 lb 9.6 oz)   SpO2 99%   BMI 33.75 kg/m    Body mass index is 33.75 kg/m .  Physical Exam   {Exam List (Optional):019383}    {Diagnostic Test Results (Optional):992214}        Signed Electronically by: Rachel Elena PA-C  {Email feedback regarding this note to [email or web address]   :663669}  " "will end by tomorrow.  They wonder if she was dehydrated of low on nutrients.  She is now taking a daily aspirin incase this could have been a TIA.  Overall, imaging in the UC/ER was unremarkable.  She did have a CT CTA done.            There are no new symptoms or concerns today.        Review of Systems  Constitutional, neuro, ENT, endocrine, pulmonary, cardiac, gastrointestinal, genitourinary, musculoskeletal, integument and psychiatric systems are negative, except as otherwise noted.      Objective    BP (!) 142/85 (BP Location: Left arm, Patient Position: Sitting, Cuff Size: Adult Large)   Pulse 71   Temp 98.3  F (36.8  C) (Oral)   Resp 16   Ht 1.549 m (5' 1\")   Wt 81 kg (178 lb 9.6 oz)   SpO2 99%   BMI 33.75 kg/m    Body mass index is 33.75 kg/m .  Physical Exam   GENERAL: alert and no distress  EYES: Eyes grossly normal to inspection, PERRL and conjunctivae and sclerae normal  HENT: ear canals and TM's normal, nose and mouth without ulcers or lesions  NECK: no adenopathy, no asymmetry, masses, or scars  RESP: lungs clear to auscultation - no rales, rhonchi or wheezes  CV: regular rate and rhythm, normal S1 S2, no S3 or S4, no murmur, click or rub, no peripheral edema  MS: no gross musculoskeletal defects noted, no edema  SKIN: no suspicious lesions or rashes  NEURO: Normal strength and tone, mentation intact and speech normal  NEURO: Normal strength and tone, sensory exam grossly normal, mentation intact, speech normal, cranial nerves 2-12 intact, rapid alternating movements normal, and normal gait.            Signed Electronically by: Rachel Elena PA-C    "

## 2025-01-07 ENCOUNTER — PATIENT OUTREACH (OUTPATIENT)
Dept: GERIATRIC MEDICINE | Facility: CLINIC | Age: 70
End: 2025-01-07
Payer: COMMERCIAL

## 2025-01-07 NOTE — PROGRESS NOTES
[facility] Care Coordination Contact    Member changed health plan products from [facility] to are [facility] effective 1/1/2025. CC to complete items on Product Change/ Health Plan Change check list including MMIS entry, as applicable. CMS mailed Welcome Letter, supporting documents, [place] and other required tasks.    Charo Alvarado  Care Management Specialist  [facility]  [phone]

## 2025-01-07 NOTE — RESULT ENCOUNTER NOTE
Lexii Purdy ,    The results from your recent lab work are within normal limits.    -All of your labs are normal.      Thank you for choosing [facility] for your health care needs,      Rachel Elena PA-C

## 2025-01-07 NOTE — LETTER
January 7, 2025    RAJWINDER LONG  [address] [place] [address]      Dear Rajwinder,    Welcome to [facility] health program. My name is ZARA Levin. I am your [facility] care coordinator. You're eligible for care coordination through your [facility] Product.    As your care coordinator, we ll:  Meet to go over your care coordination benefits  Talk about your physical and mental health care needs   Review your preventative care needs  Create a plan that meets your needs with the services you choose    What happens next?  I ll call you soon to introduce myself and tell you more about my role. We ll then plan time to go over your health and safety needs. Our goal is to keep you as healthy and independent as possible.    [facility] combines the benefits you may already have receive from Medical Assistance, Medicare and the Prescription Drug Coverage Program. Soon you'll receive a new member identification (ID) card from [facility]. Use this card whenever you get health services.    The [facility] care coordination program is voluntary and offered to you at no cost. If you wish to stop being in the care coordination program or have questions, call me at [phone]. If you reach my voicemail, leave a message and your phone number. TTY users, call the [place] [employer] at 633 or [phone] ([email or web address] relay service).    Sincerely,    ZARA Levin  [phone]  [email or web address]) is a health plan that contracts with both Medicare and the [place] Medical Assistance (Medicaid) program to provide benefits of both programs to enrollees. Enrollment in [facility] depends on contract renewal.    [id number] accepted   [id number]         [id number] (07/2022)

## 2025-01-08 NOTE — PROGRESS NOTES
[facility] Care Coordination Contact    Product change noted. Will discuss further during annual visit scheduled for next week.     ZARA Levin  [facility]  [phone]

## 2025-01-15 ENCOUNTER — PATIENT OUTREACH (OUTPATIENT)
Dept: GERIATRIC MEDICINE | Facility: CLINIC | Age: 70
End: 2025-01-15
Payer: COMMERCIAL

## 2025-01-15 DIAGNOSIS — R39.15 URINARY URGENCY: Primary | ICD-10-CM

## 2025-01-15 NOTE — Clinical Note
Hello,  I am the [facility] care coordinator for Rajwinder Lama.  I am writing to let you know I completed Rajwinder's annual assessment today. All of my documentation can be found in EPIC. Please do not hesitate to contact me with any questions or concerns.  ZARA Levin [facility] Care Coordinator [phone]

## 2025-01-21 ENCOUNTER — OFFICE VISIT (OUTPATIENT)
Dept: PEDIATRICS | Facility: CLINIC | Age: 70
End: 2025-01-21
Payer: COMMERCIAL

## 2025-01-21 VITALS
BODY MASS INDEX: 34 KG/M2 | HEART RATE: 61 BPM | TEMPERATURE: 97.6 F | WEIGHT: 180.1 LBS | HEIGHT: 61 IN | OXYGEN SATURATION: 98 % | DIASTOLIC BLOOD PRESSURE: 70 MMHG | RESPIRATION RATE: 18 BRPM | SYSTOLIC BLOOD PRESSURE: 142 MMHG

## 2025-01-21 DIAGNOSIS — I10 BENIGN ESSENTIAL HYPERTENSION: Primary | ICD-10-CM

## 2025-01-21 DIAGNOSIS — Z13.220 ENCOUNTER FOR LIPID SCREENING FOR CARDIOVASCULAR DISEASE: ICD-10-CM

## 2025-01-21 DIAGNOSIS — Z13.6 ENCOUNTER FOR LIPID SCREENING FOR CARDIOVASCULAR DISEASE: ICD-10-CM

## 2025-01-21 DIAGNOSIS — Z13.1 ENCOUNTER FOR SCREENING EXAMINATION FOR IMPAIRED GLUCOSE REGULATION AND DIABETES MELLITUS: ICD-10-CM

## 2025-01-21 PROCEDURE — 99214 OFFICE O/P EST MOD 30 MIN: CPT | Performed by: INTERNAL MEDICINE

## 2025-01-21 RX ORDER — LISINOPRIL AND HYDROCHLOROTHIAZIDE 12.5; 2 MG/1; MG/1
2 TABLET ORAL DAILY
Qty: 60 TABLET | Refills: 0 | Status: SHIPPED | OUTPATIENT
Start: 2025-01-21

## 2025-01-21 NOTE — PATIENT INSTRUCTIONS
Increase your dose of lisinopril/hydrochlorothiazide (blood pressure medication).  Return to the clinic in 2-4 weeks for a nurse visit to recheck blood pressure and check fasting labs.  I will review the results and advise next steps.  Continue aspirin for a full 3 months.  Follow-up with me in April for your annual physical (medicare) exam

## 2025-01-21 NOTE — PROGRESS NOTES
"  Assessment & Plan       ICD-10-CM    1. Benign essential hypertension   Not at goal - increase dose of meds.  See PI for plan of care. I10 lisinopril-hydrochlorothiazide (ZESTORETIC) 20-12.5 MG tablet      2. Encounter for lipid screening for cardiovascular disease  Z13.220 Lipid panel reflex to direct LDL Fasting    Z13.6       3. Encounter for screening examination for impaired glucose regulation and diabetes mellitus  Z13.1 Comprehensive metabolic panel (BMP + Alb, Alk Phos, ALT, AST, Total. Bili, TP)     Hemoglobin A1c                  BMI  Estimated body mass index is 34 kg/m  as calculated from the following:    Height as of this encounter: 1.55 m (5' 1.02\").    Weight as of this encounter: 81.7 kg (180 lb 1.6 oz).         Patient Instructions   Increase your dose of lisinopril/hydrochlorothiazide (blood pressure medication).  Return to the clinic in 2-4 weeks for a nurse visit to recheck blood pressure and check fasting labs.  I will review the results and advise next steps.  Continue aspirin for a full 3 months.  Follow-up with me in April for your annual physical (medicare) exam    Ysabel Purdy is a 70 year old, presenting for the following health issues:  Follow Up        1/21/2025     2:32 PM   Additional Questions   Roomed by miss   Accompanied by pricila,son         1/21/2025     2:32 PM   Patient Reported Additional Medications   Patient reports taking the following new medications no     History of Present Illness       Reason for visit:  Follow up  Symptom onset:  1-2 weeks ago  Symptoms include:  Right now I don 't have any symptoms  Symptom intensity:  Mild  Symptom progression:  Improving  Had these symptoms before:  No  What makes it worse:  No  What makes it better:  I have started eating properly   She is taking medications regularly.       The 10-year ASCVD risk score (Jess NATARAJAN, et al., 2019) is: 12.1%    Values used to calculate the score:      Age: 70 years      Sex: Female      " "Is Non- : Yes      Diabetic: No      Tobacco smoker: No      Systolic Blood Pressure: 153 mmHg      Is BP treated: Yes      HDL Cholesterol: 51 mg/dL      Total Cholesterol: 135 mg/dL              All other systems on a 10-point review are negative, unless otherwise noted in HPI        Objective    BP (!) 153/73 (BP Location: Right arm, Patient Position: Sitting, Cuff Size: Adult Large)   Pulse 61   Temp 97.6  F (36.4  C)   Resp 18   Ht 1.55 m (5' 1.02\")   Wt 81.7 kg (180 lb 1.6 oz)   SpO2 98%   BMI 34.00 kg/m    Body mass index is 34 kg/m .  Physical Exam   GENERAL: healthy, alert and no distress  EYES: Eyes grossly normal to inspection  NECK: no asymmetry, masses, or scars  MS: no gross musculoskeletal defects noted, no edema  SKIN: no suspicious lesions or rashes  NEURO: mentation intact and speech normal  PSYCH: mentation appears normal and affect normal/bright              Signed Electronically by: Ambar Payne MD    "

## 2025-01-25 ASSESSMENT — ACTIVITIES OF DAILY LIVING (ADL)
DEPENDENT_IADLS:: CLEANING;COOKING;LAUNDRY;SHOPPING;MEAL PREPARATION;MEDICATION MANAGEMENT;MONEY MANAGEMENT;TRANSPORTATION;INCONTINENCE

## 2025-01-25 NOTE — PROGRESS NOTES
[facility] Coordination Contact    [facility] Visit Assessment     Home visit for Health Risk Assessment with Rajwinder Lama completed on January 15, 2025    Type of residence: Private home - stairs  Current living arrangement: I live in a private home with family       Current Care Plan  Member currently receiving the following home care services: None    Member currently receiving the following community resources: None    Medication Review  Medication reconciliation completed in Epic: Yes  Medication set-up & administration: Family/informal caregiver sets up daily.  Family caregiver administers medications.  Medication Risk Assessment Medication (1 or more, place referral to MTM): N/A: No risk factors identified  MTM Referral Placed: No: No risk factors idenified    Mental/Behavioral Health   Depression Screening: Denies concerns.   PHQ-2 Total Score (Adult) - Positive if 3 or more points; Administer PHQ-9 if positive: 0  Mental health DX: No        Falls Assessment:   Fallen 2 or more times in the past year?: No   Any fall with injury in the past year?: No    ADL/IADL Dependencies:   Dependent ADLs: Bathing, Dressing, Grooming, Incontinence, Positioning, Transfers, Toileting  Dependent IADLs: Cleaning, Cooking, Laundry, Shopping, Meal Preparation, Medication Management, Money Management, Transportation, Incontinence    [facility]: Shared information regarding [employer]. Reviewed preventative health screening and health plan supplemental benefits/incentives. Reviewed medication disposal form and transition of care member handout.    CFSS Assessment completed at visit: Yes Initial CFSS Assessment indicated 6.25 hours per day of CFSS.     Elderly Waiver Eligibility: Yes-will open to     Care Plan & Recommendations: Rajwinder is seen today for her annual reassessment. Historically, she has been an HRA, however, she and her family are  requesting a full [place] assessment to determine eligibility for support services including CFSS and Elderly Waiver for homemaking support and potentially DME equipment.     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    Nena (Zhane) lives with her spouse, Nazario, her daughter, Josefina and son-in-law Christiano. Her daughter, Silke lives near the home with her own family.     Rajwinder enjoys attending services at their Mos[employer]. Zhane's family is very important to her. She has a total of 7 children. Two live locally and she sees them often they are her primary support. Adams valerio is very important to her. She is [place] Jew, and goes to Mosque with her family. She participates in coffee ceremonies, which are a central part of the [place] [employer]. It's a time for prayer and reflection, and it symbolizes community, unity, and cultural heritage. When she is at home she spends most of the time in her room, resting or with family.     Rajwinder requires assistance with most ADL's including dressing, bathing, grooming, and toileting related to memory deficits. Adams family also assists with all complex IADLs including: responding to mail; paying bills; cleaning the bathroom, kitchen and living room; and doing laundry.  Rajwinder qualifies for CFSS services at 6.25 hours per day. Rajwinder and her family will work with the consultation services provider to receive education about CFSS. The consultation services provider will support them in writing their service delivery plan. CC will review and approve as appropriate.      Elderly Waiver application will be submitted in order to implement housekeeping services at 2.25 hours daily and purchase a toilet safety frame and hand held shower head.     Family plans to assist with the  submission of any needed proofs to the [facility].  will submit 9843 and [id number] and follow up with the [facility] to ensure application is processed timely.     See [facility] Assessment for detailed assessment information.    Follow-Up Plan: Member informed of future contact, plan to f/u with member with a 6 month telephone assessment.  Contact information shared with member and family, encouraged member to call with any questions or concerns at any time.    [facility] care continuum providers: Please see Snapshot and Care Management Flowsheets for Specific details of care plan.    This CC note routed to PCP, Bismark Payne Mai.    ZARA Levin  [facility]  [phone]

## 2025-01-28 ENCOUNTER — PATIENT OUTREACH (OUTPATIENT)
Dept: GERIATRIC MEDICINE | Facility: CLINIC | Age: 70
End: 2025-01-28
Payer: COMMERCIAL

## 2025-01-28 NOTE — PROGRESS NOTES
[facility] Care Coordination Contact      [facility]:  Emailed required CFSS documents to [facility].  Mailed member supplemental summary chart and assessment summary letter.     Charo Alvarado  Care Management Specialist  [facility]  [phone]

## 2025-01-30 ENCOUNTER — PATIENT OUTREACH (OUTPATIENT)
Dept: GERIATRIC MEDICINE | Facility: CLINIC | Age: 70
End: 2025-01-30
Payer: COMMERCIAL

## 2025-01-30 NOTE — LETTER
January 30, 2025       RAJWINDER LONG  [address] [place] [address]      Dear Rajwinder,    At [facility], we re dedicated to improving your health and wellness. Enclosed is the Support Plan developed with you on 1/15/2025. Please review the Support Plan carefully.    As a reminder, during your visit we talked about:   Ways to manage your physical and mental health   Using health care to maintain and improve your health    Your preventive care needs      Remember to contact your care coordinator if you:   Are hospitalized or plan to be hospitalized    Have a fall     Have a change in your physical or mental health   Need help finding support or services    If you have questions or don t agree with your Support Plan, call me at [phone]. You can also call me if your needs change. TTY users call the [place] Relay at 990 or 1-563.101.8587 (spblda-ea-tyycmd relay service).    Sincerely,       ZARA Levin  [phone]  [email or web address]                [id number] accepted     (06/2024)                [address], [place] [address]  [phone]  fax [phone]  [facility]

## 2025-01-30 NOTE — PROGRESS NOTES
[facility] Care Coordination Contact    Received after visit chart from care coordinator.  Completed following tasks: EW not yet open - CMS will complete provider signature process and complete authorizations once tasked by CC, Mailed copy of support plan to member, Mailed [place] Cho[place] signature sheet pages 3-4, Mailed Safe Medication Disposal , and Mailed Transition of Care Member Handout   and Provider Signature - No Support Plan Shared:  Member indicates that they do not want their support plan shared with any EW providers.  Sent order to [facility] for Incontinence supplies    Charo Alvarado  Care Management Specialist  [facility]  [phone]

## 2025-02-17 DIAGNOSIS — I10 BENIGN ESSENTIAL HYPERTENSION: ICD-10-CM

## 2025-02-18 ENCOUNTER — ALLIED HEALTH/NURSE VISIT (OUTPATIENT)
Dept: PEDIATRICS | Facility: CLINIC | Age: 70
End: 2025-02-18
Payer: COMMERCIAL

## 2025-02-18 VITALS — SYSTOLIC BLOOD PRESSURE: 146 MMHG | HEART RATE: 58 BPM | DIASTOLIC BLOOD PRESSURE: 84 MMHG

## 2025-02-18 DIAGNOSIS — Z01.30 BP CHECK: Primary | ICD-10-CM

## 2025-02-18 DIAGNOSIS — Z13.1 ENCOUNTER FOR SCREENING EXAMINATION FOR IMPAIRED GLUCOSE REGULATION AND DIABETES MELLITUS: ICD-10-CM

## 2025-02-18 DIAGNOSIS — Z13.6 ENCOUNTER FOR LIPID SCREENING FOR CARDIOVASCULAR DISEASE: ICD-10-CM

## 2025-02-18 DIAGNOSIS — Z13.220 ENCOUNTER FOR LIPID SCREENING FOR CARDIOVASCULAR DISEASE: ICD-10-CM

## 2025-02-18 LAB
ALBUMIN SERPL BCG-MCNC: 4.1 G/DL (ref 3.5–5.2)
ALP SERPL-CCNC: 51 U/L (ref 40–150)
ALT SERPL W P-5'-P-CCNC: 16 U/L (ref 0–50)
ANION GAP SERPL CALCULATED.3IONS-SCNC: 10 MMOL/L (ref 7–15)
AST SERPL W P-5'-P-CCNC: 20 U/L (ref 0–45)
BILIRUB SERPL-MCNC: 0.4 MG/DL
BUN SERPL-MCNC: 19.8 MG/DL (ref 8–23)
CALCIUM SERPL-MCNC: 9.8 MG/DL (ref 8.8–10.4)
CHLORIDE SERPL-SCNC: 105 MMOL/L (ref 98–107)
CHOLEST SERPL-MCNC: 177 MG/DL
CREAT SERPL-MCNC: 0.65 MG/DL (ref 0.51–0.95)
EGFRCR SERPLBLD CKD-EPI 2021: >90 ML/MIN/1.73M2
EST. AVERAGE GLUCOSE BLD GHB EST-MCNC: 103 MG/DL
FASTING STATUS PATIENT QL REPORTED: YES
FASTING STATUS PATIENT QL REPORTED: YES
GLUCOSE SERPL-MCNC: 102 MG/DL (ref 70–99)
HBA1C MFR BLD: 5.2 % (ref 0–5.6)
HCO3 SERPL-SCNC: 29 MMOL/L (ref 22–29)
HDLC SERPL-MCNC: 58 MG/DL
LDLC SERPL CALC-MCNC: 107 MG/DL
NONHDLC SERPL-MCNC: 119 MG/DL
POTASSIUM SERPL-SCNC: 3.7 MMOL/L (ref 3.4–5.3)
PROT SERPL-MCNC: 7 G/DL (ref 6.4–8.3)
SODIUM SERPL-SCNC: 144 MMOL/L (ref 135–145)
TRIGL SERPL-MCNC: 61 MG/DL

## 2025-02-18 PROCEDURE — 99207 PR NO CHARGE NURSE ONLY: CPT

## 2025-02-18 PROCEDURE — 80053 COMPREHEN METABOLIC PANEL: CPT

## 2025-02-18 PROCEDURE — 80061 LIPID PANEL: CPT

## 2025-02-18 PROCEDURE — 36415 COLL VENOUS BLD VENIPUNCTURE: CPT

## 2025-02-18 PROCEDURE — 83036 HEMOGLOBIN GLYCOSYLATED A1C: CPT

## 2025-02-18 NOTE — LETTER
February 19, 2025      Rajwinder Lama  [address]  [place] [address]        Dear Rajwinder,     Your lab results are normal.  At this time, I do not recommend any changes to your current plan of care.     Please feel free to call with any questions.  Otherwise, we can discuss further at your next appointment.     Sincerely,     Ambar Payne MD     Resulted Orders   Lipid panel reflex to direct LDL Fasting   Result Value Ref Range    Cholesterol 177 <200 mg/dL    Triglycerides 61 <150 mg/dL    Direct Measure HDL 58 >=50 mg/dL    LDL Cholesterol Calculated 107 (H) <100 mg/dL    Non HDL Cholesterol 119 <130 mg/dL    Patient Fasting > 8hrs? Yes     Narrative    Cholesterol  Desirable: < 200 mg/dL  Borderline High: 200 - 239 mg/dL  High: >= 240 mg/dL    Triglycerides  Normal: < 150 mg/dL  Borderline High: 150 - 199 mg/dL  High: 200-499 mg/dL  Very High: >= 500 mg/dL    Direct Measure HDL  Female: >= 50 mg/dL   Male: >= 40 mg/dL    LDL Cholesterol  Desirable: < 100 mg/dL  Above Desirable: 100 - 129 mg/dL   Borderline High: 130 - 159 mg/dL   High:  160 - 189 mg/dL   Very High: >= 190 mg/dL    Non HDL Cholesterol  Desirable: < 130 mg/dL  Above Desirable: 130 - 159 mg/dL  Borderline High: 160 - 189 mg/dL  High: 190 - 219 mg/dL  Very High: >= 220 mg/dL   Comprehensive metabolic panel (BMP + Alb, Alk Phos, ALT, AST, Total. Bili, TP)   Result Value Ref Range    Sodium 144 135 - 145 mmol/L    Potassium 3.7 3.4 - 5.3 mmol/L    Carbon Dioxide (CO2) 29 22 - 29 mmol/L    Anion Gap 10 7 - 15 mmol/L    Urea Nitrogen 19.8 8.0 - 23.0 mg/dL    Creatinine 0.65 0.51 - 0.95 mg/dL    GFR Estimate >90 >60 mL/min/1.73m2      Comment:      eGFR calculated using 2021 CKD-EPI equation.    Calcium 9.8 8.8 - 10.4 mg/dL    Chloride 105 98 - 107 mmol/L    Glucose 102 (H) 70 - 99 mg/dL    Alkaline Phosphatase 51 40 - 150 U/L    AST 20 0 - 45 U/L    ALT 16 0 - 50 U/L    Protein Total 7.0 6.4 - 8.3 g/dL    Albumin 4.1 3.5 - 5.2 g/dL     Bilirubin Total 0.4 <=1.2 mg/dL    Patient Fasting > 8hrs? Yes    Hemoglobin A1c   Result Value Ref Range    Estimated Average Glucose 103 <117 mg/dL    Hemoglobin A1C 5.2 0.0 - 5.6 %      Comment:      Normal <5.7%   Prediabetes 5.7-6.4%    Diabetes 6.5% or higher     Note: Adopted from ADA consensus guidelines.

## 2025-02-18 NOTE — PROGRESS NOTES
I met with Rajwinder Lama at the request of Bismark Payne Mai   to recheck her blood pressure.  Blood pressure medications on the med list were reviewed with patient.    Patient has taken all medications as per usual regimen: Yes  Patient reports tolerating them without any issues or concerns: Yes    Vitals:    02/18/25 0926 02/18/25 0934   BP: (!) 167/76 (!) 146/84   BP Location: Right arm Right arm   Patient Position: Sitting Sitting   Cuff Size: Adult Large Adult Large   Pulse: 58 58       After 5 minutes, the patient's blood pressure remained greater than or equal to 140/90.    Is the patient currently having any chest pain? No  Does the patient currently have a headache? No  Does the patient currently have any vision changes? No  Does the patient currently have any nausea? No  Does the patient currently have any abdominal pain? No    The previous encounter was reviewed.  The patient was discharged and the note will be sent to the provider for final review.    Skylar Menendez CMA

## 2025-02-19 ENCOUNTER — TELEPHONE (OUTPATIENT)
Dept: PEDIATRICS | Facility: CLINIC | Age: 70
End: 2025-02-19

## 2025-02-19 DIAGNOSIS — I10 BENIGN ESSENTIAL HYPERTENSION: Primary | ICD-10-CM

## 2025-02-19 RX ORDER — LISINOPRIL AND HYDROCHLOROTHIAZIDE 12.5; 2 MG/1; MG/1
2 TABLET ORAL DAILY
Qty: 180 TABLET | Refills: 3 | Status: SHIPPED | OUTPATIENT
Start: 2025-02-19

## 2025-02-19 NOTE — PROGRESS NOTES
Please call - I reviewed her BP results and it looks like she is not quite at goal.  By any chance has she had the chance to check BPs at home?  IF so, can she share her home BPs with me?  Any other reason her BP did not go down as expected (I.e. missed doses, etc)?  If not, then we should probably add another agent - I would recommend amlodipine 5 mg.  Please send back to me with her response.    Ambar Payne MD

## 2025-02-19 NOTE — TELEPHONE ENCOUNTER
Received CC'd chart note from provider:       Please call - I reviewed her BP results and it looks like she is not quite at goal.  By any chance has she had the chance to check BPs at home?  IF so, can she share her home BPs with me?  Any other reason her BP did not go down as expected (I.e. missed doses, etc)?  If not, then we should probably add another agent - I would recommend amlodipine 5 mg.  Please send back to me with her response.     Ambar Payne MD

## 2025-02-19 NOTE — RESULT ENCOUNTER NOTE
Please send result letter.      Dear Rajwinder,    Your lab results are normal.  At this time, I do not recommend any changes to your current plan of care.    Please feel free to call with any questions.  Otherwise, we can discuss further at your next appointment.    Sincerely,    Ambar Payne MD

## 2025-02-19 NOTE — TELEPHONE ENCOUNTER
RN attempted to reach patient via  2x. No answer but no option to leave voicemail. Will try again tomorrow.    Moose CHAVEZ RN 2/19/2025 at 4:55 PM

## 2025-02-20 NOTE — TELEPHONE ENCOUNTER
Called pt via French  and left VM to call back and speak with a nurse.     Upon call back:  - relay message below about BP     Asha Bourne RN

## 2025-02-21 RX ORDER — AMLODIPINE BESYLATE 5 MG/1
5 TABLET ORAL DAILY
Qty: 30 TABLET | Refills: 0 | Status: SHIPPED | OUTPATIENT
Start: 2025-02-21 | End: 2025-03-18

## 2025-02-21 NOTE — TELEPHONE ENCOUNTER
Son returns call, verbal permission obtained from patient to speak to Son.    Son states patient has a home BP cuff but has not been taking them. Recommended they start checking this at home. Patient has been compliant in taking lisinopril-hydrochlorothiazide, has not missed any doses. They are open to starting new medication. Confirmed pharmacy.     Routing back to provider.    Shonda Baeza RN on 2/21/2025 at 9:51 AM

## 2025-02-21 NOTE — TELEPHONE ENCOUNTER
Sending in 30 day supply of amlodipine 5 mg.  Will need an updated BP after she has been on for about 2 weeks.  Can be nurse only visit or son can call and report home BPs.      Will send in 90 day supply with refills once we confirm adjustment in med is appropriate.    Ambar Payne MD

## 2025-02-21 NOTE — TELEPHONE ENCOUNTER
RN attempted to reach patient  at [phone] via Nepali  x2. No answer but no option to leave voicemail.       Please attempt again, phone may be having difficulties.     Yaa Merino RN

## 2025-02-24 NOTE — TELEPHONE ENCOUNTER
RN attempted to contact patient at [phone] via Thai . No answer,  left message requesting call back to any triage nurse. Call back number was provided.    Upon call back:  - Notify patient 30 day supply of amlodipine 5mg was sent to [facility] pharmacy on [address]  - Pt will need updated BP in ~2w after starting medication. Either nurse only visit or home reported BPs  - Will send 90 day supply with refills once we confirm adjustment in med is appropriate.    Miladis Daly RN   [facility]

## 2025-02-25 NOTE — TELEPHONE ENCOUNTER
Called patient at [phone] with  and spoke with patient's daughter Claire (C2C) to relay provider message below as she stated the patient is not available.    Instructed to take medications in the morning or at least by lunch instead of before bed. Instructed to take BP 1-2 hours after medication. Confirmed patient and family will record BP and send a message or call the clinic in 2 weeks with an update.    Instructed patient's daughter to call [phone] for new or worsening symptoms or further questions. Patient's daughter verbalized understanding and agrees with the plan.     Paloma Adkins RN

## 2025-03-11 NOTE — TELEPHONE ENCOUNTER
Called pt via Turkmen  and left VM to call back and speak with a nurse.   Sent pt Prisynct message.     Upon call back:  - please ask pt for BP readings from the last two weeks since starting amlodipine 5 mg    Asha Bourne RN

## 2025-03-12 NOTE — TELEPHONE ENCOUNTER
Called pt at [phone] with an Malay ([phone]), unable to speak with pt(someone picked up the phone & hung up). Need to try later.    Annika PIERRE  Clinic RN  [facility]

## 2025-03-13 NOTE — TELEPHONE ENCOUNTER
Called with use of  and left voice message for patient and daughter Josefina (C2C) to call [phone] and ask to speak to a nurse on second attempt, first attempt was answered but patient/daughter did not speak.    Upon call back please  -inquire about blood pressures for the past 2 weeks  -confirm patient has been taking new prescription for 5 mg amlodipine    Awaiting call back at this time.    Paloma Adkins RN

## 2025-03-18 DIAGNOSIS — I10 BENIGN ESSENTIAL HYPERTENSION: ICD-10-CM

## 2025-03-20 RX ORDER — AMLODIPINE BESYLATE 5 MG/1
5 TABLET ORAL DAILY
Qty: 30 TABLET | Refills: 0 | Status: SHIPPED | OUTPATIENT
Start: 2025-03-20

## 2025-03-24 NOTE — PROGRESS NOTES
Follow-up Note on Referred Patient  Gynecologic Oncology HPV Clinic  [facility]      Date of visit: 3/27/2025        Referring Provider/Gynecologist:  Dr. Shaw Trevizo MD  [address] [place] [address]       Primary Care Provider:  Bismark Payne Mai  [address] [place] [address]       RE: Rajwinder Lama  : 1955  Language: Luxembourgish   Pronouns: she/her/hers       Reason for visit: History of cervical HSIL s/p  hysterectomy, now with ASCUS vaginal cytology, non-16/18 hrHPV+.       History of Present Illness:  Rajwinder Lama is a 70 year old Luxembourgish-speaking patient referred to the Gynecologic Oncology HPV Clinic for evaluation of persistent hrHPV+ cervical cancer screening, inability to sample the endocervix. History as follows:    *HPV vaccination status: unvaccinated    Cervical/vaginal cancer screening history:  17: Pap test NILM, hrHPV16+  12/15/17: Ectocervical biopsies 5:00, 7:00, 11:00 and endocervical curettings pathology  HSIL (CIN2-3).  18: LEEP.   -Pathology: Negative for residual HSIL.     19: Pap test NILM, hrHPV16+.   19: Cervical biopsy 3:00 pathology negative for dysplasia/malignancy; Endocervical cuerttings insufficient for evaluation.    22: Pap test NILM, hrHPV16+, non-16/18 hrHPV+.   22: Endocervical curettings pathology LSIL (CIN1), although definitive endocervical mucosa not identified.     23: Pap test NILM, non-16/18 hrHPV+.   23: Pelvic exam under anesthesia, total laparoscopic hysterectomy, bilateral salpingo-oophorectomy.    -Pathology: Cervix negative for dysplasia/cancer.     24: Vaginal cytology ASCUS, non-16/18 hrHPV+.       Anal Cancer Screening History:  None.      Subjective:  Rajwinder returns to the gyn onc HPV clinic for a follow-up visit, accompanied by her daughter.   No current concerns. No vaginal bleeding. No abnormal discharge.       Past Medical History:  Past Medical History:    Diagnosis Date    ZO III (cervical intraepithelial neoplasia III) 12/15/2017    Hypertension     ((Pt Qnr Sub: patient does have hypertension))        Past Surgical History:  Past Surgical History:   Procedure Laterality Date    CYSTECTOMY OVARIAN BENIGN N/A     gall stone      gallbladder remains    LAPAROSCOPIC HYSTERECTOMY TOTAL, BILATERAL SALPINGO-OOPHORECTOMY, COMBINED Bilateral 9/25/2023    Procedure: Pelvic exam under anesthesia, total laparoscopic hysterectomy, bilateral salpingo-oophorectomy. ;  Surgeon: Francesca Emerson MD;  Location: [facility] OR    LEEP TX, CERVICAL  02/14/2018    For cervical HSIL       Current Medications:   Current Outpatient Medications   Medication Sig Dispense Refill    amLODIPine (NORVASC) 5 MG tablet Take 1 tablet (5 mg) by mouth daily. 30 tablet 0    Calcium Carb-Cholecalciferol (CALCIUM 500 +D PO) Take 1 tablet by mouth daily (Patient not taking: Reported on 3/27/2025)      lisinopril-hydrochlorothiazide (ZESTORETIC) 20-12.5 MG tablet Take 2 tablets by mouth daily. (Patient not taking: Reported on 3/27/2025) 180 tablet 3     No current facility-administered medications for this visit.        Allergies:   Allergies   Allergen Reactions    Seasonal Allergies          Social History:  Patient lives with her daughter and son-in-law. Works at [employer]. No activity limitations. She does not have Advanced Directives, but has identified her daughter Josefina Lange as her desired Healthcare Power of .    Social History     Tobacco Use    Smoking status: Never     Passive exposure: Never    Smokeless tobacco: Never   Substance Use Topics    Alcohol use: Not Currently     Comment: ethipian drink rarely       History   Drug Use No       Family History:   No known family history of breast, ovarian, uterine, colon, urothelial/renal, prostate or pancreatic cancers.  No known family history of melanoma.   Family History   Problem Relation Age of Onset    Coronary  Artery Disease Mother     Hypertension Mother     Hypertension Father     Glaucoma No family hx of     Macular Degeneration No family hx of        Physical Exam:   /74 (BP Location: Left arm, Patient Position: Sitting, Cuff Size: Adult Large)   Pulse 102   Temp 98  F (36.7  C) (Oral)   Resp 16   Wt 81.6 kg (180 lb)   SpO2 100%   BMI 33.98 kg/m    Body mass index is 33.98 kg/m .    General Appearance: healthy and alert, no distress     Musculoskeletal: extremities non tender and without edema    Skin: no lesions or rashes     Neurological: no gross defects     Psychiatric: appropriate mood and affect                               Genitourinary: External genitalia and urethral meatus appears normal.  Vaginal is grossly normal.  Vaginal cytology & HPV co-test specimen obtained.   Anal cytology & HPV co-test specimen obtained.   Verbal consent obtained from the patient for the pelvic exam.   Each step of the exam was verbalized throughout.  Present for the exam: Cindi Priest MD (PGY4)--exam and procedure performed by Dr. Priest under my direct supervision.        Procedure Risk Statement:  The patient was counseled regarding risks, benefits and alternatives to vaginoscopy, possible biopsies.  Risks discussed include but not limited to:  Bleeding; infection; injury to adjacent organs; inadequate sample or false negative result.  Discussed option of deferring x1 year per ASCCP guidelines vs vaginosocpic exam today since she was referred back to the gyn onc HPV clinic. The patient's questions were answered, and informed consent signed.       Procedure:   Vaginoscopy;  After the informed consent was signed and time-out procedure was performed, dilute acetic acid was applied to the vagina, and the vagina was visualized under colposcopic enhancement.  -Acetowhite changes? Yes--diffuse AWE at left vaginal apex.   -Lugol's non-staining areas? NA  -Mosaicism, punctations, other lesions? None  -Clinical impression:  HSIL  -Specimens: Left vaginal apex biopsy.    Left vaginal apex biopsy taken using Tischler forceps. Hemostasis achieved with application of silver nitrate.       Laboratory Examination:   I have independently reviewed the 11/13/24 vaginal cancer screening results detailed in the HPI.       Performance Status:  ECOG Grade 0.       Assessment:  Rajwinder Lama is a 70 year old patient with a history of cervical HSIL 2017 with persistent hrHPV+ cervical cancer screening now s/p hysterectomy with ASCUS vaginal cytology, non-16/18 hrHPV+.   Vaginoscopic impression HSIL, pathology pending.    Shared decision-making re: anal cancer screening (history of cervical HSIL, age >45y).       Plan:      1.)    ASCUS vaginal cytology, non-16/18 hrHPV+: I reviewed the ASCCP management guidelines with Rajwinder (Philipp et al. A common clinical dilemma: Management of abnormal vaginal cytology and human papillomavirus test results. Gynecologic Oncology 2016;141:364-70), which recommend repeat screening in 1 year for this finding; however, since she was referred back to the gyn onc HPV clinic today, she opted for vaginoscopy today.   -If pathology HSIL: RTC to discuss ablative therapy.  -If pathology LSIL/negative: RTC in 1 year for vaginal cytology & HPV co-test.    2.)  Anal cancer screening: Annual anal cytology & HPV co-test per IAINS.  Screening completed today.  -If anal cytology >ASCUS or hrHPV+: Referral to colorectal surgery for HRA.       3.) Genetic risk factors were assessed and the patient does not meet the qualifications for a referral.      4.) Labs and/or tests ordered include:  1) Vaginal cytology & HPV co-test. 2) Anal cytology & HPV co-test. 3) Surgical pathology: Left vaginal apex biopsy.   Will notify patient of results via [place] [employer].      5.) Health maintenance issues addressed today include none.    6.) Code status:  Full-code.    7.) Prescriptions: None.      Entire visit conducted with the aid of a LanguageLine  Yi .         A total of 20 minutes was spent with the patient, 12 minutes of which were spent in counseling the patient and/or treatment planning, 8 minutes of which were spent in procedure time; an additional 5 minutes was spent in chart review/documentation.      Francesca Emerson MD, MS, FACOG, FACS  3/27/2025  3:36 PM            CC  Patient Care Team:  Bismark Payne Mai, MD as PCP - General (Internal Medicine)  Francesca Emerson MD as MD (Gynecologic Oncology)  Shaw Trevizo MD as MD (OB/Gyn)  Shaw Trevizo MD as Assigned OBGYN Provider  Francesca Emerson MD as Assigned Cancer Care Provider  Beena Sykes LSW as Lead Care Coordinator (Primary Care - CC)  Bismark Payne Mai, MD as Assigned PCP  SHAW TREVIZO

## 2025-03-24 NOTE — PATIENT INSTRUCTIONS
SCHEDULING:  -RTC in 1 year for vaginal cancer screening (MD, in-person, HPV clinic with [facility] ). --order(s) placed       DIAGNOSIS:  History of cervical HSIL (CIN2-3), currently with persistent hrHPV+ cervical cancer screening and inability to sample the endocervix.  Treatment History:  -2/14/18: LEEP. No residual dysplasia.  -9/25/23: Pelvic exam under anesthesia, total laparoscopic hysterectomy (removal of uterus/cervix), bilateral salpingo-oophorectomy (removal of bilateral ovaries & fallopian tubes).  No residual dysplasia.    ASCUS vaginal cytology, non-16/18 hrHPV+.       PLAN:  1) ASCUS vaginal cytology, hrHPV+: We will notify you of results via [employer].   -If pathology HSIL: RTC to discuss treatment.   -If pathology LSIL/negative: RTC in 1year for vaginal cytology & HPV co-test.    2) Anal cancer screening: Annual anal cytology & HPV co-test per IAINS.  Screening completed today, we will notify you of results via [employer].   -If anal cytology >ASCUS or hrHPV+: Referral to colorectal surgery for HRA.      3) Healthcare maintenance: Continue routine healthcare maintenance with your primary care provider.     Please call if you have any questions or concerns. [phone]       Francesca Emerson MD, MS, FACOG, FACS  3/27/2025  3:38 PM

## 2025-03-27 ENCOUNTER — OFFICE VISIT (OUTPATIENT)
Dept: ONCOLOGY | Facility: CLINIC | Age: 70
End: 2025-03-27
Attending: OBSTETRICS & GYNECOLOGY
Payer: COMMERCIAL

## 2025-03-27 VITALS
HEART RATE: 102 BPM | WEIGHT: 180 LBS | SYSTOLIC BLOOD PRESSURE: 131 MMHG | DIASTOLIC BLOOD PRESSURE: 74 MMHG | BODY MASS INDEX: 33.98 KG/M2 | OXYGEN SATURATION: 100 % | RESPIRATION RATE: 16 BRPM | TEMPERATURE: 98 F

## 2025-03-27 DIAGNOSIS — R87.811: Primary | ICD-10-CM

## 2025-03-27 DIAGNOSIS — A63.0 HPV (HUMAN PAPILLOMA VIRUS) ANOGENITAL INFECTION: ICD-10-CM

## 2025-03-27 PROCEDURE — 87624 HPV HI-RISK TYP POOLED RSLT: CPT | Performed by: OBSTETRICS & GYNECOLOGY

## 2025-03-27 PROCEDURE — 88342 IMHCHEM/IMCYTCHM 1ST ANTB: CPT | Mod: TC | Performed by: OBSTETRICS & GYNECOLOGY

## 2025-03-27 PROCEDURE — 57452 EXAM OF CERVIX W/SCOPE: CPT | Performed by: OBSTETRICS & GYNECOLOGY

## 2025-03-27 PROCEDURE — T1013 SIGN LANG/ORAL INTERPRETER: HCPCS | Mod: U4

## 2025-03-27 PROCEDURE — 88112 CYTOPATH CELL ENHANCE TECH: CPT | Mod: TC | Performed by: OBSTETRICS & GYNECOLOGY

## 2025-03-27 ASSESSMENT — PAIN SCALES - GENERAL: PAINLEVEL_OUTOF10: NO PAIN (0)

## 2025-03-27 NOTE — LETTER
3/27/2025      Rajwinder Lama  [address]  [place] [address]      Dear Colleague,    Thank you for referring your patient, Rajwinder Lama, to the [facility]. Please see a copy of my visit note below.    Follow-up Note on Referred Patient  Gynecologic Oncology HPV Clinic  [facility]      Date of visit: 3/27/2025        Referring Provider/Gynecologist:  Dr. Shaw Trevizo MD  [address] [place] [address]       Primary Care Provider:  Bismark Payne Mai  [address] [place] [id number]       RE: Rajwinder Lama  : 1955  Language: Bengali   Pronouns: she/her/hers       Reason for visit: History of cervical HSIL s/p  hysterectomy, now with ASCUS vaginal cytology, non-16/18 hrHPV+.       History of Present Illness:  Rajwinder Lama is a 70 year old Bengali-speaking patient referred to the Gynecologic Oncology HPV Clinic for evaluation of persistent hrHPV+ cervical cancer screening, inability to sample the endocervix. History as follows:    *HPV vaccination status: unvaccinated    Cervical/vaginal cancer screening history:  17: Pap test NILM, hrHPV16+  12/15/17: Ectocervical biopsies 5:00, 7:00, 11:00 and endocervical curettings pathology  HSIL (CIN2-3).  18: LEEP.   -Pathology: Negative for residual HSIL.     19: Pap test NILM, hrHPV16+.   19: Cervical biopsy 3:00 pathology negative for dysplasia/malignancy; Endocervical cuerttings insufficient for evaluation.    22: Pap test NILM, hrHPV16+, non-16/18 hrHPV+.   22: Endocervical curettings pathology LSIL (CIN1), although definitive endocervical mucosa not identified.     23: Pap test NILM, non-16/18 hrHPV+.   23: Pelvic exam under anesthesia, total laparoscopic hysterectomy, bilateral salpingo-oophorectomy.    -Pathology: Cervix negative for dysplasia/cancer.     24: Vaginal cytology ASCUS, non-16/18 hrHPV+.       Anal Cancer Screening  History:  None.      Subjective:  Rajwinder returns to the gyn onc HPV clinic for a follow-up visit, accompanied by her daughter.   No current concerns. No vaginal bleeding. No abnormal discharge.       Past Medical History:  Past Medical History:   Diagnosis Date     ZO III (cervical intraepithelial neoplasia III) 12/15/2017     Hypertension     ((Pt Qnr Sub: patient does have hypertension))        Past Surgical History:  Past Surgical History:   Procedure Laterality Date     CYSTECTOMY OVARIAN BENIGN N/A      gall stone      gallbladder remains     LAPAROSCOPIC HYSTERECTOMY TOTAL, BILATERAL SALPINGO-OOPHORECTOMY, COMBINED Bilateral 9/25/2023    Procedure: Pelvic exam under anesthesia, total laparoscopic hysterectomy, bilateral salpingo-oophorectomy. ;  Surgeon: Francesca Emerson MD;  Location:  OR     LEEP TX, CERVICAL  02/14/2018    For cervical HSIL       Current Medications:   Current Outpatient Medications   Medication Sig Dispense Refill     amLODIPine (NORVASC) 5 MG tablet Take 1 tablet (5 mg) by mouth daily. 30 tablet 0     Calcium Carb-Cholecalciferol (CALCIUM 500 +D PO) Take 1 tablet by mouth daily (Patient not taking: Reported on 3/27/2025)       lisinopril-hydrochlorothiazide (ZESTORETIC) 20-12.5 MG tablet Take 2 tablets by mouth daily. (Patient not taking: Reported on 3/27/2025) 180 tablet 3     No current facility-administered medications for this visit.        Allergies:   Allergies   Allergen Reactions     Seasonal Allergies          Social History:  Patient lives with her daughter and son-in-law. Works at [employer]. No activity limitations. She does not have Advanced Directives, but has identified her daughter Josefina Lange as her desired Healthcare Power of .    Social History     Tobacco Use     Smoking status: Never     Passive exposure: Never     Smokeless tobacco: Never   Substance Use Topics     Alcohol use: Not Currently     Comment: ethipian drink rarely        History   Drug Use No       Family History:   No known family history of breast, ovarian, uterine, colon, urothelial/renal, prostate or pancreatic cancers.  No known family history of melanoma.   Family History   Problem Relation Age of Onset     Coronary Artery Disease Mother      Hypertension Mother      Hypertension Father      Glaucoma No family hx of      Macular Degeneration No family hx of        Physical Exam:   /74 (BP Location: Left arm, Patient Position: Sitting, Cuff Size: Adult Large)   Pulse 102   Temp 98  F (36.7  C) (Oral)   Resp 16   Wt 81.6 kg (180 lb)   SpO2 100%   BMI 33.98 kg/m    Body mass index is 33.98 kg/m .    General Appearance: healthy and alert, no distress     Musculoskeletal: extremities non tender and without edema    Skin: no lesions or rashes     Neurological: no gross defects     Psychiatric: appropriate mood and affect                               Genitourinary: External genitalia and urethral meatus appears normal.  Vaginal is grossly normal.  Vaginal cytology & HPV co-test specimen obtained.   Anal cytology & HPV co-test specimen obtained.   Verbal consent obtained from the patient for the pelvic exam.   Each step of the exam was verbalized throughout.  Present for the exam: Cindi Priest MD (PGY4)--exam and procedure performed by Dr. Priest under my direct supervision.        Procedure Risk Statement:  The patient was counseled regarding risks, benefits and alternatives to vaginoscopy, possible biopsies.  Risks discussed include but not limited to:  Bleeding; infection; injury to adjacent organs; inadequate sample or false negative result.  Discussed option of deferring x1 year per ASCCP guidelines vs vaginosocpic exam today since she was referred back to the gyn onc HPV clinic. The patient's questions were answered, and informed consent signed.       Procedure:   Vaginoscopy;  After the informed consent was signed and time-out procedure was performed,  dilute acetic acid was applied to the vagina, and the vagina was visualized under colposcopic enhancement.  -Acetowhite changes? Yes--diffuse AWE at left vaginal apex.   -Lugol's non-staining areas? NA  -Mosaicism, punctations, other lesions? None  -Clinical impression: HSIL  -Specimens: Left vaginal apex biopsy.    Left vaginal apex biopsy taken using Tischler forceps. Hemostasis achieved with application of silver nitrate.       Laboratory Examination:   I have independently reviewed the 11/13/24 vaginal cancer screening results detailed in the HPI.       Performance Status:  ECOG Grade 0.       Assessment:  Rajwinder Lama is a 70 year old patient with a history of cervical HSIL 2017 with persistent hrHPV+ cervical cancer screening now s/p hysterectomy with ASCUS vaginal cytology, non-16/18 hrHPV+.   Vaginoscopic impression HSIL, pathology pending.    Shared decision-making re: anal cancer screening (history of cervical HSIL, age >45y).       Plan:      1.)    ASCUS vaginal cytology, non-16/18 hrHPV+: I reviewed the ASCCP management guidelines with Rajwinder (Philipp et al. A common clinical dilemma: Management of abnormal vaginal cytology and human papillomavirus test results. Gynecologic Oncology 2016;141:364-70), which recommend repeat screening in 1 year for this finding; however, since she was referred back to the gyn onc HPV clinic today, she opted for vaginoscopy today.   -If pathology HSIL: RTC to discuss ablative therapy.  -If pathology LSIL/negative: RTC in 1 year for vaginal cytology & HPV co-test.    2.)  Anal cancer screening: Annual anal cytology & HPV co-test per IAINS.  Screening completed today.  -If anal cytology >ASCUS or hrHPV+: Referral to colorectal surgery for HRA.       3.) Genetic risk factors were assessed and the patient does not meet the qualifications for a referral.      4.) Labs and/or tests ordered include:  1) Vaginal cytology & HPV co-test. 2) Anal cytology & HPV co-test. 3)  "Surgical pathology: Left vaginal apex biopsy.   Will notify patient of results via [employer].      5.) Health maintenance issues addressed today include none.    6.) Code status:  Full-code.    7.) Prescriptions: None.      Entire visit conducted with the aid of a [employer] Arabic .         A total of 20 minutes was spent with the patient, 12 minutes of which were spent in counseling the patient and/or treatment planning, 8 minutes of which were spent in procedure time; an additional 5 minutes was spent in chart review/documentation.      Francesca Emerson MD, MS, FACOG, FACS  3/27/2025  3:36 PM            CC  Patient Care Team:  Bismark Payne Mai, MD as PCP - General (Internal Medicine)  Francesca Emerson MD as MD (Gynecologic Oncology)  Shaw Trevizo MD as MD (OB/Gyn)  Shaw Trevizo MD as Assigned OBGYN Provider  Francesca Emerson MD as Assigned Cancer Care Provider  Beena Sykes LSW as Lead Care Coordinator (Primary Care - CC)  Bismark Payne Mai, MD as Assigned PCP  SHAW TREVIZO      Oncology Rooming Note    March 27, 2025 2:42 PM   Rajwinder Lama is a 70 year old female who presents for:    Chief Complaint   Patient presents with     Oncology Clinic Visit     Choroidal nevus of right eye       Initial Vitals: /74 (BP Location: Left arm, Patient Position: Sitting, Cuff Size: Adult Large)   Pulse 102   Temp 98  F (36.7  C) (Oral)   Resp 16   Wt 81.6 kg (180 lb)   SpO2 100%   BMI 33.98 kg/m   Estimated body mass index is 33.98 kg/m  as calculated from the following:    Height as of 1/21/25: 1.55 m (5' 1.02\").    Weight as of this encounter: 81.6 kg (180 lb). Body surface area is 1.87 meters squared.  No Pain (0) Comment: Data Unavailable   No LMP recorded. Patient has had a hysterectomy.  Allergies reviewed: Yes  Medications reviewed: Yes    Medications: Medication refills not needed today.  Pharmacy name entered into [employer]: [id number] - [place], [place] - [address] AT [facility]    Frailty Screening:   Is the patient here for a new oncology consult visit in cancer care? 2. No    PHQ9:  Did this patient require a PHQ9?: No      Clinical concerns: no       Jennifer Deleon CMA                Again, thank you for allowing me to participate in the care of your patient.        Sincerely,        Francesca Emerson MD    Electronically signed

## 2025-03-27 NOTE — PROGRESS NOTES
"Oncology Rooming Note    March 27, 2025 2:42 PM   Rajwinder Lama is a 70 year old female who presents for:    Chief Complaint   Patient presents with    Oncology Clinic Visit     Choroidal nevus of right eye       Initial Vitals: /74 (BP Location: Left arm, Patient Position: Sitting, Cuff Size: Adult Large)   Pulse 102   Temp 98  F (36.7  C) (Oral)   Resp 16   Wt 81.6 kg (180 lb)   SpO2 100%   BMI 33.98 kg/m   Estimated body mass index is 33.98 kg/m  as calculated from the following:    Height as of 1/21/25: 1.55 m (5' 1.02\").    Weight as of this encounter: 81.6 kg (180 lb). Body surface area is 1.87 meters squared.  No Pain (0) Comment: Data Unavailable   No LMP recorded. Patient has had a hysterectomy.  Allergies reviewed: Yes  Medications reviewed: Yes    Medications: Medication refills not needed today.  Pharmacy name entered into [employer]: [id number] - [place], [place] - [address] AT [facility]    Frailty Screening:   Is the patient here for a new oncology consult visit in cancer care? 2. No    PHQ9:  Did this patient require a PHQ9?: No      Clinical concerns: no       Jennifer Deleon CMA              "

## 2025-03-30 ENCOUNTER — MYC MEDICAL ADVICE (OUTPATIENT)
Dept: PEDIATRICS | Facility: CLINIC | Age: 70
End: 2025-03-30
Payer: COMMERCIAL

## 2025-03-31 LAB
HPV HR 12 DNA CVX QL NAA+PROBE: POSITIVE
HPV16 DNA CVX QL NAA+PROBE: NEGATIVE
HPV18 DNA CVX QL NAA+PROBE: NEGATIVE
HUMAN PAPILLOMA VIRUS FINAL DIAGNOSIS: ABNORMAL
PATH REPORT.COMMENTS IMP SPEC: NORMAL
PATH REPORT.FINAL DX SPEC: NORMAL
PATH REPORT.GROSS SPEC: NORMAL
PATH REPORT.MICROSCOPIC SPEC OTHER STN: NORMAL

## 2025-03-31 PROCEDURE — 88112 CYTOPATH CELL ENHANCE TECH: CPT | Mod: 26 | Performed by: PATHOLOGY

## 2025-03-31 NOTE — TELEPHONE ENCOUNTER
"Dr Panye     - Patient sending in updated Bps     Telephone encounter 2/19/25:  \"Sending in 30 day supply of amlodipine 5 mg. Will need an updated BP after she has been on for about 2 weeks. Can be nurse only visit or son can call and report home Bps\"    Yaa BARRAZA RN on 3/31/2025 at 8:21 AM     "

## 2025-03-31 NOTE — TELEPHONE ENCOUNTER
These are trending down and have been within normal limits in recent weeks - this looks great.  I have no further recommendations at this time.  Let's check in at her upcoming appt.    Ambar Payne MD

## 2025-04-01 LAB
BKR AP ASSOCIATED HPV REPORT: NORMAL
BKR LAB AP GYN ADEQUACY: NORMAL
BKR LAB AP GYN INTERPRETATION: NORMAL
BKR LAB AP PREVIOUS ABNL DX: NORMAL
BKR LAB AP PREVIOUS ABNORMAL: NORMAL
PATH REPORT.COMMENTS IMP SPEC: NORMAL
PATH REPORT.COMMENTS IMP SPEC: NORMAL
PATH REPORT.RELEVANT HX SPEC: NORMAL

## 2025-04-02 PROCEDURE — 88342 IMHCHEM/IMCYTCHM 1ST ANTB: CPT | Mod: 26 | Performed by: PATHOLOGY

## 2025-04-02 PROCEDURE — 88305 TISSUE EXAM BY PATHOLOGIST: CPT | Mod: 26 | Performed by: PATHOLOGY

## 2025-04-14 ENCOUNTER — OFFICE VISIT (OUTPATIENT)
Dept: OPTOMETRY | Facility: CLINIC | Age: 70
End: 2025-04-14
Payer: COMMERCIAL

## 2025-04-14 DIAGNOSIS — H52.03 HYPEROPIA OF BOTH EYES: Primary | ICD-10-CM

## 2025-04-14 DIAGNOSIS — H52.4 PRESBYOPIA: ICD-10-CM

## 2025-04-14 DIAGNOSIS — H21.302: ICD-10-CM

## 2025-04-14 DIAGNOSIS — D31.31 NEVUS, CHOROIDAL, RIGHT: ICD-10-CM

## 2025-04-14 DIAGNOSIS — H25.13 NUCLEAR SCLEROSIS OF BOTH EYES: ICD-10-CM

## 2025-04-14 PROCEDURE — 92014 COMPRE OPH EXAM EST PT 1/>: CPT | Performed by: OPTOMETRIST

## 2025-04-14 PROCEDURE — 92015 DETERMINE REFRACTIVE STATE: CPT | Performed by: OPTOMETRIST

## 2025-04-14 ASSESSMENT — VISUAL ACUITY
OD_CC: 20/40
CORRECTION_TYPE: GLASSES
OS_CC: 20/30
OD_CC+: -2
OS_CC: 20/50
METHOD: SNELLEN - LINEAR
OD_CC: 20/30

## 2025-04-14 ASSESSMENT — CONF VISUAL FIELD
OS_SUPERIOR_NASAL_RESTRICTION: 0
OS_INFERIOR_TEMPORAL_RESTRICTION: 0
OS_NORMAL: 1
OD_NORMAL: 1
OD_SUPERIOR_TEMPORAL_RESTRICTION: 0
OD_SUPERIOR_NASAL_RESTRICTION: 0
OD_INFERIOR_TEMPORAL_RESTRICTION: 0
OS_SUPERIOR_TEMPORAL_RESTRICTION: 0
OD_INFERIOR_NASAL_RESTRICTION: 0
OS_INFERIOR_NASAL_RESTRICTION: 0
METHOD: COUNTING FINGERS

## 2025-04-14 ASSESSMENT — REFRACTION_MANIFEST
OD_ADD: +2.50
OS_CYLINDER: +1.25
OS_AXIS: 028
OS_ADD: +2.50
OD_CYLINDER: SPHERE
OD_SPHERE: +2.25
OS_SPHERE: +4.50

## 2025-04-14 ASSESSMENT — REFRACTION_WEARINGRX
OD_ADD: +2.50
OS_CYLINDER: +1.50
OS_SPHERE: +5.00
OS_ADD: +2.50
SPECS_TYPE: BIFOCAL
OS_AXIS: 028
OD_SPHERE: +2.75
OD_CYLINDER: SPHERE

## 2025-04-14 ASSESSMENT — TONOMETRY
IOP_METHOD: APPLANATION
OD_IOP_MMHG: 16
OS_IOP_MMHG: 16

## 2025-04-14 ASSESSMENT — CUP TO DISC RATIO
OD_RATIO: 0.45
OS_RATIO: 0.35

## 2025-04-14 ASSESSMENT — SLIT LAMP EXAM - LIDS
COMMENTS: NORMAL
COMMENTS: NORMAL

## 2025-04-14 ASSESSMENT — EXTERNAL EXAM - RIGHT EYE: OD_EXAM: NORMAL

## 2025-04-14 ASSESSMENT — EXTERNAL EXAM - LEFT EYE: OS_EXAM: NORMAL

## 2025-04-14 NOTE — LETTER
4/14/2025      Rajwinder Lama  [address]  [place] [address]      Dear Colleague,    Thank you for referring your patient, Rajwinder Lama, to the [facility] [place]. Please see a copy of my visit note below.    Chief Complaint   Patient presents with     Annual Eye Exam      Accompanied by daughter who is helping interpret   Last Eye Exam: 08/2023  Dilated Previously: Yes, side effects of dilation explained today    What are you currently using to see?  glasses       Distance Vision Acuity: Satisfied with vision    Near Vision Acuity: Satisfied with vision while reading and using computer with glasses    Eye Comfort: good  Do you use eye drops? : No      Cora Gr - Optometric Assistant           Medical, surgical and family histories reviewed and updated 4/14/2025.       OBJECTIVE: See Ophthalmology exam    ASSESSMENT:    ICD-10-CM    1. Hyperopia of both eyes  H52.03 EYE EXAM (SIMPLE-NONBILLABLE)     REFRACTION      2. Presbyopia  H52.4 REFRACTION      3. Cyst of iris margin, left  H21.302 EYE EXAM (SIMPLE-NONBILLABLE)      4. Nevus, choroidal, right  D31.31       5. Nuclear sclerosis of both eyes  H25.13       stable    PLAN:   Monitor yearly  Rin Jameson OD     Again, thank you for allowing me to participate in the care of your patient.        Sincerely,        Rin Jameson, OD    Electronically signed

## 2025-04-14 NOTE — PROGRESS NOTES
Chief Complaint   Patient presents with    Annual Eye Exam      Accompanied by daughter who is helping interpret   Last Eye Exam: 08/2023  Dilated Previously: Yes, side effects of dilation explained today    What are you currently using to see?  glasses       Distance Vision Acuity: Satisfied with vision    Near Vision Acuity: Satisfied with vision while reading and using computer with glasses    Eye Comfort: good  Do you use eye drops? : No      Cora Gr - Optometric Assistant           Medical, surgical and family histories reviewed and updated 4/14/2025.       OBJECTIVE: See Ophthalmology exam    ASSESSMENT:    ICD-10-CM    1. Hyperopia of both eyes  H52.03 EYE EXAM (SIMPLE-NONBILLABLE)     REFRACTION      2. Presbyopia  H52.4 REFRACTION      3. Cyst of iris margin, left  H21.302 EYE EXAM (SIMPLE-NONBILLABLE)      4. Nevus, choroidal, right  D31.31       5. Nuclear sclerosis of both eyes  H25.13       stable    PLAN:   Monitor yearly  Rin Jameson OD

## 2025-04-22 ENCOUNTER — OFFICE VISIT (OUTPATIENT)
Dept: PEDIATRICS | Facility: CLINIC | Age: 70
End: 2025-04-22
Payer: COMMERCIAL

## 2025-04-22 VITALS
TEMPERATURE: 98 F | WEIGHT: 181 LBS | RESPIRATION RATE: 14 BRPM | OXYGEN SATURATION: 98 % | SYSTOLIC BLOOD PRESSURE: 123 MMHG | BODY MASS INDEX: 34.17 KG/M2 | DIASTOLIC BLOOD PRESSURE: 85 MMHG | HEIGHT: 61 IN | HEART RATE: 96 BPM

## 2025-04-22 DIAGNOSIS — Z91.89 10 YEAR RISK OF MI OR STROKE 7.5% OR GREATER: ICD-10-CM

## 2025-04-22 DIAGNOSIS — Z13.9 ENCOUNTER FOR SCREENING INVOLVING SOCIAL DETERMINANTS OF HEALTH (SDOH): ICD-10-CM

## 2025-04-22 DIAGNOSIS — Z00.00 ENCOUNTER FOR MEDICARE ANNUAL WELLNESS EXAM: Primary | ICD-10-CM

## 2025-04-22 DIAGNOSIS — Z78.0 ASYMPTOMATIC MENOPAUSE: ICD-10-CM

## 2025-04-22 DIAGNOSIS — I10 BENIGN ESSENTIAL HYPERTENSION: ICD-10-CM

## 2025-04-22 PROCEDURE — G0438 PPPS, INITIAL VISIT: HCPCS | Performed by: INTERNAL MEDICINE

## 2025-04-22 PROCEDURE — 3074F SYST BP LT 130 MM HG: CPT | Performed by: INTERNAL MEDICINE

## 2025-04-22 PROCEDURE — 99214 OFFICE O/P EST MOD 30 MIN: CPT | Mod: 25 | Performed by: INTERNAL MEDICINE

## 2025-04-22 PROCEDURE — G2211 COMPLEX E/M VISIT ADD ON: HCPCS | Performed by: INTERNAL MEDICINE

## 2025-04-22 PROCEDURE — 3079F DIAST BP 80-89 MM HG: CPT | Performed by: INTERNAL MEDICINE

## 2025-04-22 RX ORDER — PRAVASTATIN SODIUM 10 MG
10 TABLET ORAL DAILY
Qty: 90 TABLET | Refills: 3 | Status: SHIPPED | OUTPATIENT
Start: 2025-04-22

## 2025-04-22 RX ORDER — AMLODIPINE BESYLATE 5 MG/1
5 TABLET ORAL DAILY
Qty: 90 TABLET | Refills: 3 | Status: SHIPPED | OUTPATIENT
Start: 2025-04-22

## 2025-04-22 RX ORDER — LISINOPRIL AND HYDROCHLOROTHIAZIDE 12.5; 2 MG/1; MG/1
2 TABLET ORAL DAILY
Qty: 180 TABLET | Refills: 3 | Status: SHIPPED | OUTPATIENT
Start: 2025-04-22

## 2025-04-22 SDOH — HEALTH STABILITY: PHYSICAL HEALTH: ON AVERAGE, HOW MANY DAYS PER WEEK DO YOU ENGAGE IN MODERATE TO STRENUOUS EXERCISE (LIKE A BRISK WALK)?: 4 DAYS

## 2025-04-22 ASSESSMENT — SOCIAL DETERMINANTS OF HEALTH (SDOH): HOW OFTEN DO YOU GET TOGETHER WITH FRIENDS OR RELATIVES?: NEVER

## 2025-04-22 NOTE — PATIENT INSTRUCTIONS
Patient Education   Preventive Care Advice   This is general advice given by our system to help you stay healthy. However, your care team may have specific advice just for you. Please talk to your care team about your preventive care needs.  Nutrition  Eat 5 or more servings of fruits and vegetables each day.  Try wheat bread, brown rice and whole grain pasta (instead of white bread, rice, and pasta).  Get enough calcium and vitamin D. Check the label on foods and aim for 100% of the RDA (recommended daily allowance).  Lifestyle  Exercise at least 150 minutes each week  (30 minutes a day, 5 days a week).  Do muscle strengthening activities 2 days a week. These help control your weight and prevent disease.  No smoking.  Wear sunscreen to prevent skin cancer.  Have a dental exam and cleaning every 6 months.  Yearly exams  See your health care team every year to talk about:  Any changes in your health.  Any medicines your care team has prescribed.  Preventive care, family planning, and ways to prevent chronic diseases.  Shots (vaccines)   HPV shots (up to age 26), if you've never had them before.  Hepatitis B shots (up to age 59), if you've never had them before.  COVID-19 shot: Get this shot when it's due.  Flu shot: Get a flu shot every year.  Tetanus shot: Get a tetanus shot every 10 years.  Pneumococcal, hepatitis A, and RSV shots: Ask your care team if you need these based on your risk.  Shingles shot (for age 50 and up)  General health tests  Diabetes screening:  Starting at age 35, Get screened for diabetes at least every 3 years.  If you are younger than age 35, ask your care team if you should be screened for diabetes.  Cholesterol test: At age 39, start having a cholesterol test every 5 years, or more often if advised.  Bone density scan (DEXA): At age 50, ask your care team if you should have this scan for osteoporosis (brittle bones).  Hepatitis C: Get tested at least once in your life.  STIs (sexually  transmitted infections)  Before age 24: Ask your care team if you should be screened for STIs.  After age 24: Get screened for STIs if you're at risk. You are at risk for STIs (including HIV) if:  You are sexually active with more than one person.  You don't use condoms every time.  You or a partner was diagnosed with a sexually transmitted infection.  If you are at risk for HIV, ask about PrEP medicine to prevent HIV.  Get tested for HIV at least once in your life, whether you are at risk for HIV or not.  Cancer screening tests  Cervical cancer screening: If you have a cervix, begin getting regular cervical cancer screening tests starting at age 21.  Breast cancer scan (mammogram): If you've ever had breasts, begin having regular mammograms starting at age 40. This is a scan to check for breast cancer.  Colon cancer screening: It is important to start screening for colon cancer at age 45.  Have a colonoscopy test every 10 years (or more often if you're at risk) Or, ask your provider about stool tests like a FIT test every year or Cologuard test every 3 years.  To learn more about your testing options, visit:   .  For help making a decision, visit:   https://bit.ly/bj90624.  Prostate cancer screening test: If you have a prostate, ask your care team if a prostate cancer screening test (PSA) at age 55 is right for you.  Lung cancer screening: If you are a current or former smoker ages 50 to 80, ask your care team if ongoing lung cancer screenings are right for you.  For informational purposes only. Not to replace the advice of your health care provider. Copyright   2023 [facility]. All rights reserved. Clinically reviewed by the [facility] Transitions Program. [employer] [id number] - REV 01/24.  Learning About Activities of Daily Living  What are activities of daily living?     Activities of daily living (ADLs) are the basic self-care tasks you do every day. These include eating, bathing, dressing,  and moving around.  As you age, and if you have health problems, you may find that it's harder to do some of these tasks. If so, your doctor can suggest ideas that may help.  To measure what kind of help you may need, your doctor will ask how well you are able to do ADLs. Let your doctor know if there are any tasks that you are having trouble doing. This is an important first step to getting help. And when you have the help you need, you can stay as independent as possible.  How will a doctor assess your ADLs?  Asking about ADLs is part of a routine health checkup your doctor will likely do as you age. Your health check might be done in a doctor's office, in your home, or at a hospital. The goal is to find out if you are having any problems that could make it hard to care for yourself or that make it unsafe for you to be on your own.  To measure your ADLs, your doctor will ask how hard it is for you to do routine tasks. Your doctor may also want to know if you have changed the way you do a task because of a health problem. Your doctor may watch how you:  Walk back and forth.  Keep your balance while you stand or walk.  Move from sitting to standing or from a bed to a chair.  Button or unbutton a shirt or sweater.  Remove and put on your shoes.  It's common to feel a little worried or anxious if you find you can't do all the things you used to be able to do. Talking with your doctor about ADLs is a way to make sure you're as safe as possible and able to care for yourself as well as you can. You may want to bring a caregiver, friend, or family member to your checkup. They can help you talk to your doctor.  Follow-up care is a key part of your treatment and safety. Be sure to make and go to all appointments, and call your doctor if you are having problems. It's also a good idea to know your test results and keep a list of the medicines you take.  Current as of: October 24, 2024  Content Version: 14.4    5181-9969  [employer].   Care instructions adapted under license by your healthcare professional. If you have questions about a medical condition or this instruction, always ask your healthcare professional. [employer] disclaims any warranty or liability for your use of this information.    Preventing Falls: Care Instructions  Injuries and health problems such as trouble walking or poor eyesight can increase your risk of falling. So can some medicines. But there are things you can do to help prevent falls. You can exercise to get stronger. You can also arrange your home to make it safer.    Talk to your doctor about the medicines you take. Ask if any of them increase the risk of falls and whether they can be changed or stopped.   Try to exercise regularly. It can help improve your strength and balance. This can help lower your risk of falling.         Practice fall safety and prevention.   Wear low-heeled shoes that fit well and give your feet good support. Talk to your doctor if you have foot problems that make this hard.  Carry a cellphone or wear a medical alert device that you can use to call for help.  Use stepladders instead of chairs to reach high objects. Don't climb if you're at risk for falls. Ask for help, if needed.  Wear the correct eyeglasses, if you need them.        Make your home safer.   Remove rugs, cords, clutter, and furniture from walkways.  Keep your house well lit. Use night-lights in hallways and bathrooms.  Install and use sturdy handrails on stairways.  Wear nonskid footwear, even inside. Don't walk barefoot or in socks without shoes.        Be safe outside.   Use handrails, curb cuts, and ramps whenever possible.  Keep your hands free by using a shoulder bag or backpack.  Try to walk in well-lit areas. Watch out for uneven ground, changes in pavement, and debris.  Be careful in the winter. Walk on the grass or gravel when sidewalks are slippery. Use de-icer on steps and  "walkways. Add non-slip devices to shoes.    Put grab bars and nonskid mats in your shower or tub and near the toilet. Try to use a shower chair or bath bench when bathing.   Get into a tub or shower by putting in your weaker leg first. Get out with your strong side first. Have a phone or medical alert device in the bathroom with you.   Where can you learn more?  Go to https://www.[employer].net/patiented  Enter G117 in the search box to learn more about \"Preventing Falls: Care Instructions.\"  Current as of: July 31, 2024  Content Version: 14.4    8733-9018 [employer].   Care instructions adapted under license by your healthcare professional. If you have questions about a medical condition or this instruction, always ask your healthcare professional. [employer] disclaims any warranty or liability for your use of this information.    Hearing Loss: Care Instructions  Overview     Hearing loss is a sudden or slow decrease in how well you hear. It can range from slight to profound. Permanent hearing loss can occur with aging. It also can happen when you are exposed long-term to loud noise. Examples include listening to loud music, riding motorcycles, or being around other loud machines.  Hearing loss can affect your work and home life. It can make you feel lonely or depressed. You may feel that you have lost your independence. But hearing aids and other devices can help you hear better and feel connected to others.  Follow-up care is a key part of your treatment and safety. Be sure to make and go to all appointments, and call your doctor if you are having problems. It's also a good idea to know your test results and keep a list of the medicines you take.  How can you care for yourself at home?  Avoid loud noises whenever possible. This helps keep your hearing from getting worse.  Always wear hearing protection around loud noises.  Wear a hearing aid as directed.  A professional can help you pick a " "hearing aid that will work best for you.  You can also get hearing aids over the counter for mild to moderate hearing loss.  Have hearing tests as your doctor suggests. They can show whether your hearing has changed. Your hearing aid may need to be adjusted.  Use other devices as needed. These may include:  Telephone amplifiers and hearing aids that can connect to a television, stereo, radio, or microphone.  Devices that use lights or vibrations. These alert you to the doorbell, a ringing telephone, or a baby monitor.  Television closed-captioning. This shows the words at the bottom of the screen. Most new TVs can do this.  TTY (text telephone). This lets you type messages back and forth on the telephone instead of talking or listening. These devices are also called TDD. When messages are typed on the keyboard, they are sent over the phone line to a receiving TTY. The message is shown on a monitor.  Use text messaging, social media, and email if it is hard for you to communicate by telephone.  Try to learn a listening technique called speechreading. It is not lipreading. You pay attention to people's gestures, expressions, posture, and tone of voice. These clues can help you understand what a person is saying. Face the person you are talking to, and have them face you. Make sure the lighting is good. You need to see the other person's face clearly.  Think about counseling if you need help to adjust to your hearing loss.  When should you call for help?  Watch closely for changes in your health, and be sure to contact your doctor if:    You think your hearing is getting worse.     You have new symptoms, such as dizziness or nausea.   Where can you learn more?  Go to https://www.[employer]ther[employer].net/patiented  Enter R798 in the search box to learn more about \"Hearing Loss: Care Instructions.\"  Current as of: October 27, 2024  Content Version: 14.4    4217-7176 [facility].   Care instructions adapted under license " by your healthcare professional. If you have questions about a medical condition or this instruction, always ask your healthcare professional. [employer] disclaims any warranty or liability for your use of this information.    Relationships for Good Health  Relationships are important for our health and happiness. Social isolation, loneliness and lack of support are bad for your health. Studies show that loneliness can harm health and limit your life span as much as high blood pressure and smoking.   Take some time to reflect on your relationships. Then answer these questions:  Are there people in your life that cause you stress or drain your energy? What can you do to set limits?  ________________________________________________________________________________________________________________________________________________________________________________________________________________________________________________________________________________________________________________________________________________  Who do you enjoy spending time with? Who can you go to for support?  ________________________________________________________________________________________________________________________________________________________________________________________________________________________________________________________________________________________________________________________________________________  What can you do to improve your relationships with others?  __________________________________________________________________________________________________________________________________________________________________________________________________________________  ______________________________________________________________________________________________________________________________  What do you like most about your relationships with  others?  ________________________________________________________________________________________________________________________________________________________________________________________________________________________________________________________________________________________________________________________________________________  My goal: ______________________________________________________________________  I will: ______________________________________________________________________________________________________________________________________________________________________________________________    For informational purposes only. Not to replace the advice of your health care provider. Copyright   2018 [employer] [facility]. All rights reserved. Clinically reviewed by Bariatric Health  Team. [place] [id number] - Rev 06/24.  Learning About Sleeping Well  What does sleeping well mean?     Sleeping well means getting enough sleep to feel good and stay healthy. How much sleep is enough varies among people.  The number of hours you sleep and how you feel when you wake up are both important. If you do not feel refreshed, you probably need more sleep. Another sign of not getting enough sleep is feeling tired during the day.  Experts recommend that adults get at least 7 or more hours of sleep per day. Children and older adults need more sleep.  Why is getting enough sleep important?  Getting enough quality sleep is a basic part of good health. When your sleep suffers, your physical health, mood, and your thoughts can suffer too. You may find yourself feeling more grumpy or stressed. Not getting enough sleep also can lead to serious problems, including injury, accidents, anxiety, and depression.  What might cause poor sleeping?  Many things can cause sleep problems, including:  Changes to your sleep schedule.  Stress. Stress can be caused by fear about a single event, such as giving a speech.  "Or you may have ongoing stress, such as worry about work or school.  Depression, anxiety, and other mental or emotional conditions.  Changes in your sleep habits or surroundings. This includes changes that happen where you sleep, such as noise, light, or sleeping in a different bed. It also includes changes in your sleep pattern, such as having jet lag or working a late shift.  Health problems, such as pain, breathing problems, and restless legs syndrome.  Lack of regular exercise.  Using alcohol, nicotine, or caffeine before bed.  How can you help yourself?  Here are some tips that may help you sleep more soundly and wake up feeling more refreshed.  Your sleeping area   Use your bedroom only for sleeping and sex. A bit of light reading may help you fall asleep. But if it doesn't, do your reading elsewhere in the house. Try not to use your TV, computer, smartphone, or tablet while you are in bed.  Be sure your bed is big enough to stretch out comfortably, especially if you have a sleep partner.  Keep your bedroom quiet, dark, and cool. Use curtains, blinds, or a sleep mask to block out light. To block out noise, use earplugs, soothing music, or a \"white noise\" machine.  Your evening and bedtime routine   Create a relaxing bedtime routine. You might want to take a warm shower or bath, or listen to soothing music.  Go to bed at the same time every night. And get up at the same time every morning, even if you feel tired.  What to avoid   Limit caffeine (coffee, tea, caffeinated sodas) during the day, and don't have any for at least 6 hours before bedtime.  Avoid drinking alcohol before bedtime. Alcohol can cause you to wake up more often during the night.  Try not to smoke or use tobacco, especially in the evening. Nicotine can keep you awake.  Limit naps during the day, especially close to bedtime.  Avoid lying in bed awake for too long. If you can't fall asleep or if you wake up in the middle of the night and can't " "get back to sleep within about 20 minutes, get out of bed and go to another room until you feel sleepy.  Avoid taking medicine right before bed that may keep you awake or make you feel hyper or energized. Your doctor can tell you if your medicine may do this and if you can take it earlier in the day.  If you can't sleep   Imagine yourself in a peaceful, pleasant scene. Focus on the details and feelings of being in a place that is relaxing.  Get up and do a quiet or boring activity until you feel sleepy.  Avoid drinking any liquids before going to bed to help prevent waking up often to use the bathroom.  Where can you learn more?  Go to https://www.[employer].net/patiented  Enter J942 in the search box to learn more about \"Learning About Sleeping Well.\"  Current as of: July 31, 2024  Content Version: 14.4    8248-8044 [employer].   Care instructions adapted under license by your healthcare professional. If you have questions about a medical condition or this instruction, always ask your healthcare professional. [employer] disclaims any warranty or liability for your use of this information.    Bladder Training: Care Instructions  Your Care Instructions     Bladder training is used to treat urge incontinence and stress incontinence. Urge incontinence means that the need to urinate comes on so fast that you can't get to a toilet in time. Stress incontinence means that you leak urine because of pressure on your bladder. For example, it may happen when you laugh, cough, or lift something heavy.  Bladder training can increase how long you can wait before you have to urinate. It can also help your bladder hold more urine. And it can give you better control over the urge to urinate.  It is important to remember that bladder training takes a few weeks to a few months to make a difference. You may not see results right away, but don't give up.  Follow-up care is a key part of your treatment and safety. " Be sure to make and go to all appointments, and call your doctor if you are having problems. It's also a good idea to know your test results and keep a list of the medicines you take.  How can you care for yourself at home?  Work with your doctor to come up with a bladder training program that is right for you. You may use one or more of the following methods.  Delayed urination  In the beginning, try to keep from urinating for 5 minutes after you first feel the need to go.  While you wait, take deep, slow breaths to relax. Kegel exercises can also help you delay the need to go to the bathroom.  After some practice, when you can easily wait 5 minutes to urinate, try to wait 10 minutes before you urinate.  Slowly increase the waiting period until you are able to control when you have to urinate.  Scheduled urination  Empty your bladder when you first wake up in the morning.  Schedule times throughout the day when you will urinate.  Start by going to the bathroom every hour, even if you don't need to go.  Slowly increase the time between trips to the bathroom.  When you have found a schedule that works well for you, keep doing it.  If you wake up during the night and have to urinate, do it. Apply your schedule to waking hours only.  Kegel exercises  These tighten and strengthen pelvic muscles, which can help you control the flow of urine. (If doing these exercises causes pain, stop doing them and talk with your doctor.) To do Kegel exercises:  Squeeze your muscles as if you were trying not to pass gas. Or squeeze your muscles as if you were stopping the flow of urine. Your belly, legs, and buttocks shouldn't move.  Hold the squeeze for 3 seconds, then relax for 5 to 10 seconds.  Start with 3 seconds, then add 1 second each week until you are able to squeeze for 10 seconds.  Repeat the exercise 10 times a session. Do 3 to 8 sessions a day.  When should you call for help?  Watch closely for changes in your health, and  "be sure to contact your doctor if:    Your incontinence is getting worse.     You do not get better as expected.   Where can you learn more?  Go to https://www.[employer].net/patiented  Enter V684 in the search box to learn more about \"Bladder Training: Care Instructions.\"  Current as of: April 30, 2024  Content Version: 14.4    4824-2450 [employer].   Care instructions adapted under license by your healthcare professional. If you have questions about a medical condition or this instruction, always ask your healthcare professional. [employer] disclaims any warranty or liability for your use of this information.       "

## 2025-04-22 NOTE — PROGRESS NOTES
Preventive Care Visit  [facility] ENDY Payne MD, Internal Medicine - Pediatrics  Apr 22, 2025      Assessment & Plan     Encounter for Medicare annual wellness exam    Benign essential hypertension  - Stable, no side effects with medications, refilled meds today    - lisinopril-hydrochlorothiazide (ZESTORETIC) 20-12.5 MG tablet; Take 2 tablets by mouth daily.  - amLODIPine (NORVASC) 5 MG tablet; Take 1 tablet (5 mg) by mouth daily.    10 year risk of MI or stroke 7.5% or greater  - Stable, no side effects with medications, refilled meds today    - pravastatin (PRAVACHOL) 10 MG tablet; Take 1 tablet (10 mg) by mouth daily.  - Lipid panel reflex to direct LDL Fasting; Future  - **Comprehensive metabolic panel FUTURE 2mo; Future    Encounter for screening involving social determinants of health (SDoH)  - Primary Care - Care Coordination Referral; Future    Asymptomatic menopause  - DX Bone Density; Future    The longitudinal plan of care for the diagnosis(es)/condition(s) as documented were addressed during this visit. Due to the added complexity in care, I will continue to support Rajwinder in the subsequent management and with ongoing continuity of care.        Counseling  Appropriate preventive services were addressed with this patient via screening, questionnaire, or discussion as appropriate for fall prevention, nutrition, physical activity, Tobacco-use cessation, social engagement, weight loss and cognition.  Checklist reviewing preventive services available has been given to the patient.      Follow-up    Follow-up Visit   Expected date:  Apr 29, 2026 (Approximate)      Follow Up Appointment Details:     Follow-up with whom?: PCP    Follow-Up for what?: Medicare Wellness    Welcome or Annual?: Annual Wellness    How?: In Person                 Subjective   Rajwinder is a 70 year old, presenting for the following:  Physical        4/22/2025     9:57 AM   Additional Questions   Roomed by  Janey Romo   Accompanied by daughter         4/22/2025     9:57 AM   Patient Reported Additional Medications   Patient reports taking the following new medications No           HPI    The 10-year ASCVD risk score (Jess NATARAJAN, et al., 2019) is: 10.6%    Values used to calculate the score:      Age: 70 years      Sex: Female      Is Non- : Yes      Diabetic: No      Tobacco smoker: No      Systolic Blood Pressure: 123 mmHg      Is BP treated: Yes      HDL Cholesterol: 58 mg/dL      Total Cholesterol: 177 mg/dL               Advance Care Planning    Document on file is a Health Care Directive or POLST.        4/22/2025   General Health   How would you rate your overall physical health? (!) FAIR   Feel stress (tense, anxious, or unable to sleep) Only a little   (!) STRESS CONCERN      4/22/2025   Nutrition   Diet: I don't know         4/22/2025   Exercise   Days per week of moderate/strenous exercise 4 days         4/22/2025   Social Factors   Frequency of gathering with friends or relatives Never   Worry food won't last until get money to buy more Yes   Food not last or not have enough money for food? Yes   Do you have housing? (Housing is defined as stable permanent housing and does not include staying ouside in a car, in a tent, in an abandoned building, in an overnight shelter, or couch-surfing.) No   Are you worried about losing your housing? No   Lack of transportation? Yes   Unable to get utilities (heat,electricity)? No   Want help with housing or utility concern? (!) YES   (!) FOOD SECURITY CONCERN PRESENT (!) TRANSPORTATION CONCERN PRESENT(!) HOUSING CONCERN PRESENT(!) SOCIAL CONNECTIONS CONCERN      4/22/2025   Fall Risk   Fallen 2 or more times in the past year? Yes   Trouble with walking or balance? Yes   Gait Speed Test (Document in seconds) 4   Gait Speed Test Interpretation Less than or equal to 5.00 seconds - PASS          4/22/2025   Activities of Daily Living- Home Safety    Needs help with the following daily activites Transportation    Shopping    Preparing meals    Housework    Bathing    Laundry    Medication administration    Money management   Safety concerns in the home No grab bars in the bathroom       Multiple values from one day are sorted in reverse-chronological order         4/22/2025   Dental   Dentist two times every year? (!) NO         4/22/2025   Hearing Screening   Hearing concerns? (!) I NEED TO ASK PEOPLE TO SPEAK UP OR REPEAT THEMSELVES.         4/22/2025   Driving Risk Screening   Patient/family members have concerns about driving (!) DECLINE         4/22/2025   General Alertness/Fatigue Screening   Have you been more tired than usual lately? (!) YES         4/22/2025   Urinary Incontinence Screening   Bothered by leaking urine in past 6 months Yes         Today's PHQ-2 Score:       4/22/2025     9:51 AM   PHQ-2 ( 1999 Pfizer)   PHQ-2 Score Incomplete           4/22/2025   Substance Use   Alcohol more than 3/day or more than 7/wk No   Do you have a current opioid prescription? No   How severe/bad is pain from 1 to 10? 3/10   Do you use any other substances recreationally? (!) DECLINE     Social History     Tobacco Use    Smoking status: Never     Passive exposure: Never    Smokeless tobacco: Never   Vaping Use    Vaping status: Never Used   Substance Use Topics    Alcohol use: Not Currently     Comment: ethipian drink rarely    Drug use: No           12/17/2024   LAST FHS-7 RESULTS   1st degree relative breast or ovarian cancer No   Any relative bilateral breast cancer No   Any male have breast cancer No   Any ONE woman have BOTH breast AND ovarian cancer No   Any woman with breast cancer before 50yrs No   2 or more relatives with breast AND/OR ovarian cancer No   2 or more relatives with breast AND/OR bowel cancer No        Mammogram Screening - Mammogram every 1-2 years updated in Health Maintenance based on mutual decision making    ASCVD Risk   The  10-year ASCVD risk score (Jess NATARAJAN, et al., 2019) is: 10.6%    Values used to calculate the score:      Age: 70 years      Sex: Female      Is Non- : Yes      Diabetic: No      Tobacco smoker: No      Systolic Blood Pressure: 123 mmHg      Is BP treated: Yes      HDL Cholesterol: 58 mg/dL      Total Cholesterol: 177 mg/dL            Reviewed and updated as needed this visit by Provider                      Current providers sharing in care for this patient include:  Patient Care Team:  [place]ark Payne Mai, MD as PCP - General (Internal Medicine)  Francesca Emerson MD as MD (Gynecologic Oncology)  Shaw Trevizo MD as MD (OB/Gyn)  Shaw Trevizo MD as Assigned OBGYN Provider  Francesca Emerson MD as Assigned Cancer Care Provider  Beena Sykes LSW as Lead Care Coordinator (Primary Care - CC)  [place] Payne Mai, MD as Assigned PCP    The following health maintenance items are reviewed in Epic and correct as of today:  Health Maintenance   Topic Date Due    ZOSTER IMMUNIZATION (1 of 2) Never done    Pneumococcal Vaccine: 50+ Years (2 of 2 - PPSV23) 08/25/2021    MEDICARE ANNUAL WELLNESS VISIT  05/16/2024    INFLUENZA VACCINE (1) 09/01/2024    COVID-19 Vaccine (5 - 2024-25 season) 09/01/2024    ANNUAL REVIEW OF HM ORDERS  01/21/2026    BMP  02/18/2026    FALL RISK ASSESSMENT  04/22/2026    MAMMO SCREENING  12/17/2026    DTAP/TDAP/TD IMMUNIZATION (2 - Td or Tdap) 06/01/2027    DIABETES SCREENING  02/18/2028    ADVANCE CARE PLANNING  05/16/2028    RSV VACCINE (1 - 1-dose 75+ series) 01/01/2029    LIPID  02/18/2030    DEXA  02/12/2034    HEPATITIS C SCREENING  Completed    PHQ-2 (once per calendar year)  Completed    PAP FOLLOW-UP  Completed    HPV FOLLOW-UP  Completed    HPV IMMUNIZATION  Aged Out    MENINGITIS IMMUNIZATION  Aged Out    COLORECTAL CANCER SCREENING  Discontinued       All other systems on a 10-point review are negative, unless otherwise noted in HPI       Objective   "  Exam  /85 (BP Location: Right arm, Patient Position: Sitting, Cuff Size: Adult Regular)   Pulse 96   Temp 98  F (36.7  C) (Oral)   Resp 14   Ht 1.549 m (5' 1\")   Wt 82.1 kg (181 lb)   SpO2 98%   BMI 34.20 kg/m     Estimated body mass index is 34.2 kg/m  as calculated from the following:    Height as of this encounter: 1.549 m (5' 1\").    Weight as of this encounter: 82.1 kg (181 lb).    Physical Exam  GENERAL: alert and no distress  EYES: Eyes grossly normal to inspection, PERRL and conjunctivae and sclerae normal  HENT: ear canals and TM's normal, nose and mouth without ulcers or lesions  NECK: no adenopathy, no asymmetry, masses, or scars  RESP: lungs clear to auscultation - no rales, rhonchi or wheezes  CV: regular rate and rhythm, normal S1 S2, no S3 or S4, no murmur, click or rub, no peripheral edema  ABDOMEN: soft, nontender, no hepatosplenomegaly, no masses and bowel sounds normal  MS: no gross musculoskeletal defects noted, no edema  SKIN: no suspicious lesions or rashes  NEURO: Normal strength and tone, mentation intact and speech normal  PSYCH: mentation appears normal, affect normal/bright        4/22/2025   Mini Cog   Mini-Cog Not Completed (choose reason) Language barrier and no  present              Signed Electronically by: Ambar Payne MD    "

## 2025-04-23 ENCOUNTER — APPOINTMENT (OUTPATIENT)
Dept: OPTOMETRY | Facility: CLINIC | Age: 70
End: 2025-04-23
Payer: COMMERCIAL

## 2025-04-23 PROCEDURE — 92341 FIT SPECTACLES BIFOCAL: CPT | Performed by: OPTOMETRIST

## 2025-05-07 ENCOUNTER — PATIENT OUTREACH (OUTPATIENT)
Dept: GERIATRIC MEDICINE | Facility: CLINIC | Age: 70
End: 2025-05-07
Payer: COMMERCIAL

## 2025-05-07 NOTE — PROGRESS NOTES
[facility] Care Coordination Contact        Situation: initial Assessment completed on 1/15/2024 and CFSS and waiver services were requested.      Assessment:  [facility] and [facility]. Authorizations were requested.  sent an email to the [facility] requesting a follow up on waiver request. [facility] reports that the request is in cue.      Plan/Recommendations:  will follow up with [facility] in 4 weeks.      ZARA Levin  [facility]  [phone]

## 2025-05-23 DIAGNOSIS — I10 BENIGN ESSENTIAL HYPERTENSION: ICD-10-CM

## 2025-05-27 RX ORDER — LISINOPRIL AND HYDROCHLOROTHIAZIDE 20; 25 MG/1; MG/1
1 TABLET ORAL DAILY
Qty: 30 TABLET | Refills: 0 | OUTPATIENT
Start: 2025-05-27

## 2025-07-08 ENCOUNTER — PATIENT OUTREACH (OUTPATIENT)
Dept: GERIATRIC MEDICINE | Facility: CLINIC | Age: 70
End: 2025-07-08
Payer: COMMERCIAL

## 2025-07-08 NOTE — PROGRESS NOTES
[facility] Care Coordination Contact      [facility] Six-Month Telephone Assessment    6 month telephone assessment completed on 7/8/2025.    ER visits: No  Hospitalizations: No  TCU stays: No  Significant health status changes: No major changes noted. Rajwinder completed a medicare annual wellness exam on 4/22/2025 to address chronic health conditions including hypertension and risk of stroke.   Falls/Injuries: No  ADL/IADL changes: No  Changes in services: Yes: Spoke with sonChristiano who reports that Rajwinder is interested in starting to attend an Adult Day Care center 3 days a week. CC sent several options that would be culturally appropriate.   EW was opened successfully. CC will also add in 2 hours of homemaking per week.     Caregiver Assessment follow up:  n/a    Goals: See Support Plan for goal progress documentation.      Will see member in 6 months for an annual health risk assessment.   Encouraged member to call CC with any questions or concerns in the meantime.     ZARA Levin  [facility]  [phone]

## 2025-07-14 NOTE — PROGRESS NOTES
[facility] Care Coordination Contact    Member has chosen the following Day Center:  [facility]   [address]  [address]   [facility] [address]  Phone: [phone]  Fax: [phone]      Authorizations for homemaking, ADC, and transportation submitted and Support Plan updated.      ZARA Levin  [facility]  [phone]

## 2025-07-15 ENCOUNTER — PATIENT OUTREACH (OUTPATIENT)
Dept: GERIATRIC MEDICINE | Facility: CLINIC | Age: 70
End: 2025-07-15
Payer: COMMERCIAL

## 2025-07-15 NOTE — PROGRESS NOTES
[facility] Care Coordination Contact    Auth sent to [facility] for HMKG and ADC with Transportation, Data base updated.    Member Signature - Support Plan Change:  Per CC, member has made a change to their support plan.  Care Plan Change Letter mailed to member for signature with a self-addressed return envelope.    Provider Signature - No Support Plan Shared:  Member indicates that they do not want their support plan shared with any EW providers.     Charo Alvraado  Care Management Specialist  [facility]  [phone]

## 2025-07-17 ENCOUNTER — TELEPHONE (OUTPATIENT)
Dept: PEDIATRICS | Facility: CLINIC | Age: 70
End: 2025-07-17
Payer: COMMERCIAL

## 2025-07-17 NOTE — TELEPHONE ENCOUNTER
I filled out form to the best of my ability, but need MA/TC to call to verify the following:   The Y/N answers I provided are correct and  Determine whether medical assistance with her medications is needed at  and answer Y or N on form.

## 2025-07-17 NOTE — TELEPHONE ENCOUNTER
Forms/Letter Request    Type of form/letter: OTHER: Medical Report        Do we have the form/letter: Yes: On Provider Report     Who is the form from? [facility]  (if other please explain)    Where did/will the form come from? form was faxed in    When is form/letter needed by: Asap

## 2025-07-19 ENCOUNTER — HEALTH MAINTENANCE LETTER (OUTPATIENT)
Age: 70
End: 2025-07-19

## 2025-07-21 NOTE — TELEPHONE ENCOUNTER
Contacts       Contact Date/Time Type Contact Phone/Fax    07/17/2025 01:01 PM CDT Fax (Incoming) [facility]  [phone]          Attempted to reach patient to: Collect additional information    Information needed: Need more information on Form    When patient returns call, please take this action: OK to relay (verbatim): See Previous note

## 2025-08-21 DIAGNOSIS — I10 BENIGN ESSENTIAL HYPERTENSION: ICD-10-CM

## 2025-08-21 RX ORDER — LISINOPRIL AND HYDROCHLOROTHIAZIDE 20; 25 MG/1; MG/1
1 TABLET ORAL DAILY
Qty: 30 TABLET | Refills: 0 | OUTPATIENT
Start: 2025-08-21

## 2025-08-31 ENCOUNTER — OFFICE VISIT (OUTPATIENT)
Dept: URGENT CARE | Facility: URGENT CARE | Age: 70
End: 2025-08-31
Payer: COMMERCIAL

## 2025-08-31 VITALS
OXYGEN SATURATION: 97 % | BODY MASS INDEX: 33.69 KG/M2 | DIASTOLIC BLOOD PRESSURE: 79 MMHG | TEMPERATURE: 97.7 F | SYSTOLIC BLOOD PRESSURE: 118 MMHG | WEIGHT: 178.3 LBS | RESPIRATION RATE: 19 BRPM | HEART RATE: 86 BPM

## 2025-08-31 DIAGNOSIS — S09.90XA CLOSED HEAD INJURY, INITIAL ENCOUNTER: Primary | ICD-10-CM

## 2025-08-31 PROCEDURE — 3074F SYST BP LT 130 MM HG: CPT | Performed by: INTERNAL MEDICINE

## 2025-08-31 PROCEDURE — 3078F DIAST BP <80 MM HG: CPT | Performed by: INTERNAL MEDICINE

## 2025-08-31 PROCEDURE — 99213 OFFICE O/P EST LOW 20 MIN: CPT | Performed by: INTERNAL MEDICINE

## (undated) DEVICE — CATH TRAY FOLEY SURESTEP 16FR WDRAIN BAG STLK LATEX A300316A

## (undated) DEVICE — GLOVE BIOGEL PI ULTRATOUCH SZ 6.5 41165

## (undated) DEVICE — JELLY LUBRICATING SURGILUBE 2OZ TUBE

## (undated) DEVICE — ESU ENDO SCISSORS 5MM CVD 5DCS

## (undated) DEVICE — SU ENDO STITCH POLYSORB 0 48" 170052

## (undated) DEVICE — SU VICRYL 0 UR-6 27" J603H

## (undated) DEVICE — Device

## (undated) DEVICE — SU VICRYL 3-0 SH 27" J316H

## (undated) DEVICE — DILATOR PROBE UTERINE OS CANAL FINDER 260-610

## (undated) DEVICE — TUBING C02 INSUFFLATION LAP FILTER HEATER 6198

## (undated) DEVICE — TUBING SUCTION 12"X1/4" N612

## (undated) DEVICE — KIT PATIENT POSITIONING PIGAZZI LATEX FREE 40580

## (undated) DEVICE — SUCTION IRR STRYKERFLOW II W/TIP 250-070-520

## (undated) DEVICE — SUCTION CANISTER MEDIVAC LINER 1500ML W/LID 65651-515

## (undated) DEVICE — KOH COLPOTOMIZER OCCLUDER  CPO-6

## (undated) DEVICE — DRAPE LEGGINGS 8421

## (undated) DEVICE — SYSTEM LAPAROVUE VISIBILITY LAPVUE10

## (undated) DEVICE — GLOVE BIOGEL PI MICRO INDICATOR UNDERGLOVE SZ 7.0 48970

## (undated) DEVICE — SYR 50ML LL W/O NDL 309653

## (undated) DEVICE — ESU LIGASURE LAPAROSCOPIC BLUNT TIP SEALER 5MMX37CM LF1837

## (undated) DEVICE — ENDO TROCAR FIRST ENTRY KII FIOS ADV FIX 11X100MM CFF33

## (undated) DEVICE — SU DERMABOND ADVANCED .7ML DNX12

## (undated) DEVICE — TUBING IRRIG CYSTO/BLADDER SET 81" LF 2C4040

## (undated) DEVICE — ENDO TROCAR SLEEVE KII ADV FIXATION 05X100MM CFS02

## (undated) DEVICE — GRASPER LAPAROSCOPIC EPIX 5MMX35CM C4130

## (undated) DEVICE — ESU HOLDER LAP INST DISP PURPLE LONG 330MM H-PRO-330

## (undated) DEVICE — ENDO TROCAR FIRST ENTRY KII FIOS ADV FIX 12X100MM CFF73

## (undated) DEVICE — SOL NACL 0.9% IRRIG 1000ML BOTTLE 2F7124

## (undated) DEVICE — ESU CORD MONOPOLAR 10'  E0510

## (undated) DEVICE — SOL NACL 0.9% INJ 1000ML BAG 2B1324X

## (undated) DEVICE — SU VICRYL 4-0 PS-2 18" UND J496H

## (undated) DEVICE — ENDO TROCAR FIRST ENTRY KII FIOS ADV FIX 05X100MM CFF03

## (undated) DEVICE — EVAC SYSTEM CLEAR FLOW SC082500

## (undated) DEVICE — WIPES FOLEY CARE SURESTEP PROVON DFC100

## (undated) DEVICE — SYR 50ML CATH TIP W/O NDL 309620

## (undated) DEVICE — ESU GROUND PAD ADULT W/CORD E7507

## (undated) DEVICE — DEVICE ENDO STITCH APPLIER 10MM 173016

## (undated) DEVICE — LINEN TOWEL PACK X5 5464

## (undated) RX ORDER — FENTANYL CITRATE 50 UG/ML
INJECTION, SOLUTION INTRAMUSCULAR; INTRAVENOUS
Status: DISPENSED
Start: 2023-09-25

## (undated) RX ORDER — PROPOFOL 10 MG/ML
INJECTION, EMULSION INTRAVENOUS
Status: DISPENSED
Start: 2023-09-25

## (undated) RX ORDER — FENTANYL CITRATE 0.05 MG/ML
INJECTION, SOLUTION INTRAMUSCULAR; INTRAVENOUS
Status: DISPENSED
Start: 2023-09-25

## (undated) RX ORDER — OXYCODONE HYDROCHLORIDE 5 MG/1
TABLET ORAL
Status: DISPENSED
Start: 2023-09-25

## (undated) RX ORDER — HYDROXYZINE HYDROCHLORIDE 25 MG/1
TABLET, FILM COATED ORAL
Status: DISPENSED
Start: 2023-09-25

## (undated) RX ORDER — ACETAMINOPHEN 325 MG/1
TABLET ORAL
Status: DISPENSED
Start: 2023-09-25

## (undated) RX ORDER — METRONIDAZOLE 500 MG/100ML
INJECTION, SOLUTION INTRAVENOUS
Status: DISPENSED
Start: 2023-09-25